# Patient Record
Sex: MALE | Race: WHITE | Employment: OTHER | URBAN - METROPOLITAN AREA
[De-identification: names, ages, dates, MRNs, and addresses within clinical notes are randomized per-mention and may not be internally consistent; named-entity substitution may affect disease eponyms.]

---

## 2018-08-09 ENCOUNTER — HOSPITAL ENCOUNTER (OUTPATIENT)
Dept: WOUND CARE | Age: 68
Discharge: HOME | End: 2018-08-09

## 2021-07-14 ENCOUNTER — APPOINTMENT (OUTPATIENT)
Dept: CT IMAGING | Age: 71
End: 2021-07-14
Payer: MEDICARE

## 2021-07-14 ENCOUNTER — HOSPITAL ENCOUNTER (EMERGENCY)
Age: 71
Discharge: HOME | End: 2021-07-14
Attending: EMERGENCY MEDICINE
Payer: MEDICARE

## 2021-07-14 VITALS
HEIGHT: 68 IN | HEART RATE: 94 BPM | DIASTOLIC BLOOD PRESSURE: 69 MMHG | TEMPERATURE: 97.9 F | BODY MASS INDEX: 28.04 KG/M2 | WEIGHT: 185 LBS | RESPIRATION RATE: 20 BRPM | SYSTOLIC BLOOD PRESSURE: 154 MMHG | OXYGEN SATURATION: 95 %

## 2021-07-14 DIAGNOSIS — R01.1 HEART MURMUR: ICD-10-CM

## 2021-07-14 DIAGNOSIS — R56.9 SEIZURE (HCC): Primary | ICD-10-CM

## 2021-07-14 LAB
ANION GAP SERPL CALCULATED.3IONS-SCNC: 16 MMOL/L (ref 7–16)
BASOPHILS ABSOLUTE: 0.04 E9/L (ref 0–0.2)
BASOPHILS RELATIVE PERCENT: 0.4 % (ref 0–2)
BUN BLDV-MCNC: 33 MG/DL (ref 6–23)
CALCIUM SERPL-MCNC: 9.1 MG/DL (ref 8.6–10.2)
CHLORIDE BLD-SCNC: 101 MMOL/L (ref 98–107)
CHP ED QC CHECK: YES
CO2: 18 MMOL/L (ref 22–29)
CREAT SERPL-MCNC: 1.9 MG/DL (ref 0.7–1.2)
EOSINOPHILS ABSOLUTE: 0.23 E9/L (ref 0.05–0.5)
EOSINOPHILS RELATIVE PERCENT: 2.1 % (ref 0–6)
GFR AFRICAN AMERICAN: 43
GFR NON-AFRICAN AMERICAN: 35 ML/MIN/1.73
GLUCOSE BLD-MCNC: 119 MG/DL
GLUCOSE BLD-MCNC: 128 MG/DL (ref 74–99)
HCT VFR BLD CALC: 34.6 % (ref 37–54)
HEMOGLOBIN: 11.8 G/DL (ref 12.5–16.5)
IMMATURE GRANULOCYTES #: 0.05 E9/L
IMMATURE GRANULOCYTES %: 0.4 % (ref 0–5)
LYMPHOCYTES ABSOLUTE: 0.85 E9/L (ref 1.5–4)
LYMPHOCYTES RELATIVE PERCENT: 7.6 % (ref 20–42)
MCH RBC QN AUTO: 33.8 PG (ref 26–35)
MCHC RBC AUTO-ENTMCNC: 34.1 % (ref 32–34.5)
MCV RBC AUTO: 99.1 FL (ref 80–99.9)
METER GLUCOSE: 119 MG/DL (ref 74–99)
MONOCYTES ABSOLUTE: 0.77 E9/L (ref 0.1–0.95)
MONOCYTES RELATIVE PERCENT: 6.9 % (ref 2–12)
NEUTROPHILS ABSOLUTE: 9.18 E9/L (ref 1.8–7.3)
NEUTROPHILS RELATIVE PERCENT: 82.6 % (ref 43–80)
PDW BLD-RTO: 12.8 FL (ref 11.5–15)
PLATELET # BLD: 249 E9/L (ref 130–450)
PMV BLD AUTO: 9.8 FL (ref 7–12)
POTASSIUM SERPL-SCNC: 4.7 MMOL/L (ref 3.5–5)
RBC # BLD: 3.49 E12/L (ref 3.8–5.8)
SODIUM BLD-SCNC: 135 MMOL/L (ref 132–146)
WBC # BLD: 11.1 E9/L (ref 4.5–11.5)

## 2021-07-14 PROCEDURE — 99284 EMERGENCY DEPT VISIT MOD MDM: CPT

## 2021-07-14 PROCEDURE — 2580000003 HC RX 258: Performed by: EMERGENCY MEDICINE

## 2021-07-14 PROCEDURE — 70450 CT HEAD/BRAIN W/O DYE: CPT

## 2021-07-14 PROCEDURE — 80048 BASIC METABOLIC PNL TOTAL CA: CPT

## 2021-07-14 PROCEDURE — 93005 ELECTROCARDIOGRAM TRACING: CPT | Performed by: EMERGENCY MEDICINE

## 2021-07-14 PROCEDURE — 85025 COMPLETE CBC W/AUTO DIFF WBC: CPT

## 2021-07-14 PROCEDURE — 82962 GLUCOSE BLOOD TEST: CPT

## 2021-07-14 RX ORDER — LISINOPRIL 30 MG/1
30 TABLET ORAL DAILY
Status: ON HOLD | COMMUNITY
End: 2022-08-17 | Stop reason: HOSPADM

## 2021-07-14 RX ORDER — 0.9 % SODIUM CHLORIDE 0.9 %
1000 INTRAVENOUS SOLUTION INTRAVENOUS ONCE
Status: COMPLETED | OUTPATIENT
Start: 2021-07-14 | End: 2021-07-14

## 2021-07-14 RX ADMIN — SODIUM CHLORIDE 1000 ML: 9 INJECTION, SOLUTION INTRAVENOUS at 14:40

## 2021-07-14 ASSESSMENT — ENCOUNTER SYMPTOMS
CHEST TIGHTNESS: 0
ABDOMINAL PAIN: 0
SHORTNESS OF BREATH: 0

## 2021-07-14 NOTE — ED PROVIDER NOTES
44-year-old male presenting via EMS after concern for a seizure that occurred prior to arrival.  He was initially minimally responsive by paramedics. Bystanders report that he fell to the ground, had generalized shaking, they were not sure how long. Patient bit his right side of his mouth, is awake, more alert and oriented now than as originally reported. Sudden onset, persistent, moderate severity, unspecified duration, no known seizure history. He did have a postictal period and is improving currently. Family History   Problem Relation Age of Onset    COPD Mother     Diabetes Mother     Diabetes Father      Past Surgical History:   Procedure Laterality Date    APPENDECTOMY      CATARACT REMOVAL WITH IMPLANT Right 2 2 15    CATARACT REMOVAL WITH IMPLANT Left 4/6/15    COLONOSCOPY  7 years ago    Dr Mitchell Sy      child       Review of Systems   Constitutional: Negative for chills and fever. HENT:        Bit his tongue   Respiratory: Negative for chest tightness and shortness of breath. Cardiovascular: Negative for chest pain. Gastrointestinal: Negative for abdominal pain. Neurological: Positive for seizures. All other systems reviewed and are negative. Physical Exam  Constitutional:       General: He is not in acute distress. Appearance: He is well-developed. HENT:      Head: Normocephalic and atraumatic. Mouth/Throat:      Comments: Lesion to the right side of his tongue  Eyes:      Pupils: Pupils are equal, round, and reactive to light. Neck:      Thyroid: No thyromegaly. Cardiovascular:      Rate and Rhythm: Normal rate and regular rhythm. Pulmonary:      Effort: Pulmonary effort is normal. No respiratory distress. Breath sounds: Normal breath sounds. No wheezing. Abdominal:      General: There is no distension. Palpations: Abdomen is soft. There is no mass. Tenderness: There is no abdominal tenderness.  There is no guarding or rebound. Musculoskeletal:         General: No tenderness. Normal range of motion. Cervical back: Normal range of motion and neck supple. Skin:     General: Skin is warm and dry. Findings: No erythema. Neurological:      Mental Status: He is alert. He is disoriented. Cranial Nerves: No cranial nerve deficit. Psychiatric:         Mood and Affect: Mood normal.          Procedures     [facility]              --------------------------------------------- PAST HISTORY ---------------------------------------------  Past Medical History:  has a past medical history of Diverticulitis, Gout, Hypertension, and Tobacco abuse. Past Surgical History:  has a past surgical history that includes Colonoscopy (7 years ago); Appendectomy; Nasal fracture surgery; Cataract removal with implant (Right, 2 2 15); and Cataract removal with implant (Left, 4/6/15). Social History:  reports that he has been smoking. He has a 60.00 pack-year smoking history. He has never used smokeless tobacco. He reports current alcohol use. He reports that he does not use drugs. Family History: family history includes COPD in his mother; Diabetes in his father and mother. The patients home medications have been reviewed. Allergies: Patient has no known allergies.     -------------------------------------------------- RESULTS -------------------------------------------------  Labs:  Results for orders placed or performed during the hospital encounter of 07/14/21   CBC auto differential   Result Value Ref Range    WBC 11.1 4.5 - 11.5 E9/L    RBC 3.49 (L) 3.80 - 5.80 E12/L    Hemoglobin 11.8 (L) 12.5 - 16.5 g/dL    Hematocrit 34.6 (L) 37.0 - 54.0 %    MCV 99.1 80.0 - 99.9 fL    MCH 33.8 26.0 - 35.0 pg    MCHC 34.1 32.0 - 34.5 %    RDW 12.8 11.5 - 15.0 fL    Platelets 225 445 - 037 E9/L    MPV 9.8 7.0 - 12.0 fL    Neutrophils % 82.6 (H) 43.0 - 80.0 %    Immature Granulocytes % 0.4 0.0 - 5.0 %    Lymphocytes % 7.6 (L) 20.0 - 42.0 %    Monocytes % 6.9 2.0 - 12.0 %    Eosinophils % 2.1 0.0 - 6.0 %    Basophils % 0.4 0.0 - 2.0 %    Neutrophils Absolute 9.18 (H) 1.80 - 7.30 E9/L    Immature Granulocytes # 0.05 E9/L    Lymphocytes Absolute 0.85 (L) 1.50 - 4.00 E9/L    Monocytes Absolute 0.77 0.10 - 0.95 E9/L    Eosinophils Absolute 0.23 0.05 - 0.50 E9/L    Basophils Absolute 0.04 0.00 - 0.20 D0/G   Basic metabolic panel   Result Value Ref Range    Sodium 135 132 - 146 mmol/L    Potassium 4.7 3.5 - 5.0 mmol/L    Chloride 101 98 - 107 mmol/L    CO2 18 (L) 22 - 29 mmol/L    Anion Gap 16 7 - 16 mmol/L    Glucose 128 (H) 74 - 99 mg/dL    BUN 33 (H) 6 - 23 mg/dL    CREATININE 1.9 (H) 0.7 - 1.2 mg/dL    GFR Non-African American 35 >=60 mL/min/1.73    GFR African American 43     Calcium 9.1 8.6 - 10.2 mg/dL   POCT Glucose   Result Value Ref Range    Glucose 119 mg/dL    QC OK? yes    POCT Glucose   Result Value Ref Range    Meter Glucose 119 (H) 74 - 99 mg/dL   EKG 12 Lead   Result Value Ref Range    Ventricular Rate 94 BPM    Atrial Rate 94 BPM    P-R Interval 186 ms    QRS Duration 88 ms    Q-T Interval 352 ms    QTc Calculation (Bazett) 440 ms    P Axis 35 degrees    R Axis 52 degrees    T Axis 39 degrees       Radiology:  CT HEAD WO CONTRAST   Final Result   1. No acute cerebral abnormality, no sign of hemorrhage or infarction. 2. Small, old right lacunar caudate lacunar infarct.             ------------------------- NURSING NOTES AND VITALS REVIEWED ---------------------------  Date / Time Roomed:  7/14/2021 12:26 PM  ED Bed Assignment:  04/04    The nursing notes within the ED encounter and vital signs as below have been reviewed.    BP (!) 154/69   Pulse 94   Temp 97.9 °F (36.6 °C) (Temporal)   Resp 20   Ht 5' 8\" (1.727 m)   Wt 185 lb (83.9 kg)   SpO2 95%   BMI 28.13 kg/m²   Oxygen Saturation Interpretation: Normal      ------------------------------------------ PROGRESS NOTES ------------------------------------------  I have spoken with the patient and discussed todays results, in addition to providing specific details for the plan of care and counseling regarding the diagnosis and prognosis. Their questions are answered at this time and they are agreeable with the plan. I discussed at length with them reasons for immediate return here for re evaluation. They will followup with primary care by calling their office tomorrow. --------------------------------- ADDITIONAL PROVIDER NOTES ---------------------------------  At this time the patient is without objective evidence of an acute process requiring hospitalization or inpatient management. They have remained hemodynamically stable throughout their entire ED visit and are stable for discharge with outpatient follow-up. The plan has been discussed in detail and they are aware of the specific conditions for emergent return, as well as the importance of follow-up. Discharge Medication List as of 7/14/2021  3:20 PM          Diagnosis:  1. Seizure ([address]    2. Heart murmur        Disposition:  Patient's disposition: Discharge to home  Patient's condition is stable.             Eriberto Page,   07/15/21 2038

## 2021-07-14 NOTE — ED NOTES
Bed: H1  Expected date:   Expected time:   Means of arrival:   Comments:  edson Ham RN  07/14/21 [id number]

## 2021-07-15 LAB
EKG ATRIAL RATE: 94 BPM
EKG P AXIS: 35 DEGREES
EKG P-R INTERVAL: 186 MS
EKG Q-T INTERVAL: 352 MS
EKG QRS DURATION: 88 MS
EKG QTC CALCULATION (BAZETT): 440 MS
EKG R AXIS: 52 DEGREES
EKG T AXIS: 39 DEGREES
EKG VENTRICULAR RATE: 94 BPM

## 2021-07-15 PROCEDURE — 93010 ELECTROCARDIOGRAM REPORT: CPT | Performed by: INTERNAL MEDICINE

## 2021-07-26 ENCOUNTER — APPOINTMENT (OUTPATIENT)
Dept: GENERAL RADIOLOGY | Age: 71
End: 2021-07-26
Payer: MEDICARE

## 2021-07-26 ENCOUNTER — HOSPITAL ENCOUNTER (EMERGENCY)
Age: 71
Discharge: HOME | End: 2021-07-26
Attending: EMERGENCY MEDICINE
Payer: MEDICARE

## 2021-07-26 VITALS
HEART RATE: 87 BPM | DIASTOLIC BLOOD PRESSURE: 79 MMHG | BODY MASS INDEX: 27.4 KG/M2 | TEMPERATURE: 98.4 F | HEIGHT: 69 IN | WEIGHT: 185 LBS | RESPIRATION RATE: 18 BRPM | SYSTOLIC BLOOD PRESSURE: 146 MMHG | OXYGEN SATURATION: 98 %

## 2021-07-26 DIAGNOSIS — S92.425B OPEN NONDISPLACED FRACTURE OF DISTAL PHALANX OF LEFT GREAT TOE, INITIAL ENCOUNTER: Primary | ICD-10-CM

## 2021-07-26 LAB
ANION GAP SERPL CALCULATED.3IONS-SCNC: 12 MMOL/L (ref 7–16)
BASOPHILS ABSOLUTE: 0.03 E9/L (ref 0–0.2)
BASOPHILS RELATIVE PERCENT: 0.3 % (ref 0–2)
BUN BLDV-MCNC: 45 MG/DL (ref 6–23)
CALCIUM SERPL-MCNC: 9.3 MG/DL (ref 8.6–10.2)
CHLORIDE BLD-SCNC: 101 MMOL/L (ref 98–107)
CO2: 21 MMOL/L (ref 22–29)
CREAT SERPL-MCNC: 2 MG/DL (ref 0.7–1.2)
EOSINOPHILS ABSOLUTE: 0.22 E9/L (ref 0.05–0.5)
EOSINOPHILS RELATIVE PERCENT: 2.1 % (ref 0–6)
GFR AFRICAN AMERICAN: 40
GFR NON-AFRICAN AMERICAN: 33 ML/MIN/1.73
GLUCOSE BLD-MCNC: 80 MG/DL (ref 74–99)
HCT VFR BLD CALC: 32.5 % (ref 37–54)
HEMOGLOBIN: 11.2 G/DL (ref 12.5–16.5)
IMMATURE GRANULOCYTES #: 0.03 E9/L
IMMATURE GRANULOCYTES %: 0.3 % (ref 0–5)
LACTIC ACID: 1.5 MMOL/L (ref 0.5–2.2)
LYMPHOCYTES ABSOLUTE: 1.16 E9/L (ref 1.5–4)
LYMPHOCYTES RELATIVE PERCENT: 11.2 % (ref 20–42)
MCH RBC QN AUTO: 33.9 PG (ref 26–35)
MCHC RBC AUTO-ENTMCNC: 34.5 % (ref 32–34.5)
MCV RBC AUTO: 98.5 FL (ref 80–99.9)
MONOCYTES ABSOLUTE: 1.08 E9/L (ref 0.1–0.95)
MONOCYTES RELATIVE PERCENT: 10.4 % (ref 2–12)
NEUTROPHILS ABSOLUTE: 7.82 E9/L (ref 1.8–7.3)
NEUTROPHILS RELATIVE PERCENT: 75.7 % (ref 43–80)
PDW BLD-RTO: 12.8 FL (ref 11.5–15)
PLATELET # BLD: 299 E9/L (ref 130–450)
PMV BLD AUTO: 10.1 FL (ref 7–12)
POTASSIUM SERPL-SCNC: 4.7 MMOL/L (ref 3.5–5)
RBC # BLD: 3.3 E12/L (ref 3.8–5.8)
SODIUM BLD-SCNC: 134 MMOL/L (ref 132–146)
WBC # BLD: 10.3 E9/L (ref 4.5–11.5)

## 2021-07-26 PROCEDURE — 73630 X-RAY EXAM OF FOOT: CPT

## 2021-07-26 PROCEDURE — 85025 COMPLETE CBC W/AUTO DIFF WBC: CPT

## 2021-07-26 PROCEDURE — 36415 COLL VENOUS BLD VENIPUNCTURE: CPT

## 2021-07-26 PROCEDURE — 87040 BLOOD CULTURE FOR BACTERIA: CPT

## 2021-07-26 PROCEDURE — 80048 BASIC METABOLIC PNL TOTAL CA: CPT

## 2021-07-26 PROCEDURE — 2580000003 HC RX 258: Performed by: EMERGENCY MEDICINE

## 2021-07-26 PROCEDURE — 99283 EMERGENCY DEPT VISIT LOW MDM: CPT

## 2021-07-26 PROCEDURE — 6360000002 HC RX W HCPCS: Performed by: PHYSICIAN ASSISTANT

## 2021-07-26 PROCEDURE — 6360000002 HC RX W HCPCS: Performed by: EMERGENCY MEDICINE

## 2021-07-26 PROCEDURE — 83605 ASSAY OF LACTIC ACID: CPT

## 2021-07-26 PROCEDURE — 96374 THER/PROPH/DIAG INJ IV PUSH: CPT

## 2021-07-26 PROCEDURE — 90471 IMMUNIZATION ADMIN: CPT | Performed by: PHYSICIAN ASSISTANT

## 2021-07-26 PROCEDURE — 90715 TDAP VACCINE 7 YRS/> IM: CPT | Performed by: PHYSICIAN ASSISTANT

## 2021-07-26 RX ORDER — AMOXICILLIN AND CLAVULANATE POTASSIUM 875; 125 MG/1; MG/1
1 TABLET, FILM COATED ORAL 2 TIMES DAILY
Qty: 14 TABLET | Refills: 0 | Status: SHIPPED | OUTPATIENT
Start: 2021-07-26 | End: 2021-08-02

## 2021-07-26 RX ADMIN — TETANUS TOXOID, REDUCED DIPHTHERIA TOXOID AND ACELLULAR PERTUSSIS VACCINE, ADSORBED 0.5 ML: 5; 2.5; 8; 8; 2.5 SUSPENSION INTRAMUSCULAR at 16:31

## 2021-07-26 RX ADMIN — WATER 1000 MG: 1 INJECTION INTRAMUSCULAR; INTRAVENOUS; SUBCUTANEOUS at 18:25

## 2021-07-26 ASSESSMENT — PAIN DESCRIPTION - PAIN TYPE: TYPE: ACUTE PAIN

## 2021-07-26 ASSESSMENT — PAIN DESCRIPTION - ORIENTATION: ORIENTATION: LEFT

## 2021-07-26 ASSESSMENT — PAIN DESCRIPTION - FREQUENCY: FREQUENCY: INTERMITTENT

## 2021-07-26 ASSESSMENT — PAIN DESCRIPTION - LOCATION: LOCATION: TOE (COMMENT WHICH ONE)

## 2021-07-26 ASSESSMENT — ENCOUNTER SYMPTOMS
CHEST TIGHTNESS: 0
SHORTNESS OF BREATH: 0

## 2021-07-26 NOTE — ED PROVIDER NOTES
65-year-old male presenting with pain and redness and swelling the left foot. He dropped a can of green beans onto his great toe a few days ago. He has redness, dried blood, swelling to the great toe along the foot as well. Patient awake calm alert, oriented x4. No distress at this time. No shortness of breath, no chest pain, no lightheadedness, no systemic fever or chills, no body aches or other signs of illness. This is a sudden onset problem, persistent, mild to moderate severity, few days duration, associate with dropping something on his foot. Tetanus will be updated today. Family History   Problem Relation Age of Onset    COPD Mother     Diabetes Mother     Diabetes Father      Past Surgical History:   Procedure Laterality Date    APPENDECTOMY      CATARACT REMOVAL WITH IMPLANT Right 2 2 15    CATARACT REMOVAL WITH IMPLANT Left 4/6/15    COLONOSCOPY  7 years ago    Dr Palak Albert      child       Review of Systems   Constitutional: Negative for chills and fever. Respiratory: Negative for chest tightness and shortness of breath. Cardiovascular: Positive for leg swelling. Musculoskeletal:        Toe pain, redness     All other systems reviewed and are negative. Physical Exam  Constitutional:       General: He is not in acute distress. Appearance: He is well-developed. HENT:      Head: Normocephalic and atraumatic. Eyes:      Pupils: Pupils are equal, round, and reactive to light. Neck:      Thyroid: No thyromegaly. Cardiovascular:      Rate and Rhythm: Normal rate and regular rhythm. Pulmonary:      Effort: Pulmonary effort is normal. No respiratory distress. Breath sounds: Normal breath sounds. No wheezing. Abdominal:      General: There is no distension. Palpations: Abdomen is soft. There is no mass. Tenderness: There is no abdominal tenderness. There is no guarding or rebound.    Musculoskeletal:         General: No tenderness. Normal range of motion. Cervical back: Normal range of motion and neck supple. Skin:     General: Skin is warm and dry. Findings: Erythema present. Comments: Erythema and swelling to the left great toe, swelling to the foot as well, redness extending to the midfoot. Neurological:      Mental Status: He is alert and oriented to person, place, and time. Cranial Nerves: No cranial nerve deficit. Psychiatric:         Mood and Affect: Mood normal.         Behavior: Behavior normal.          Procedures     [facility]     ED Course as of Jul 26 2113   Mon Jul 26, 2021   Kate Riverogina 694 Spoke with the radiologist regarding the fracture of the distal phalanx on the toe. [SO]      ED Course User Index  [SO] [facility]      ED Course as of Jul 26 2113   Mon Jul 26, 2021   1826 Spoke with the radiologist regarding the fracture of the distal phalanx on the toe. [SO]      ED Course User Index  [SO] Josue Vallejo DO       --------------------------------------------- PAST HISTORY ---------------------------------------------  Past Medical History:  has a past medical history of Diverticulitis, Gout, Hypertension, and Tobacco abuse. Past Surgical History:  has a past surgical history that includes Colonoscopy (7 years ago); Appendectomy; Nasal fracture surgery; Cataract removal with implant (Right, 2 2 15); and Cataract removal with implant (Left, 4/6/15). Social History:  reports that he has been smoking. He has a 60.00 pack-year smoking history. He has never used smokeless tobacco. He reports current alcohol use. He reports that he does not use drugs. Family History: family history includes COPD in his mother; Diabetes in his father and mother. The patients home medications have been reviewed. Allergies: Patient has no known allergies.     -------------------------------------------------- RESULTS -------------------------------------------------  Labs:  Results for orders placed or performed during the hospital encounter of 07/26/21   CBC Auto Differential   Result Value Ref Range    WBC 10.3 4.5 - 11.5 E9/L    RBC 3.30 (L) 3.80 - 5.80 E12/L    Hemoglobin 11.2 (L) 12.5 - 16.5 g/dL    Hematocrit 32.5 (L) 37.0 - 54.0 %    MCV 98.5 80.0 - 99.9 fL    MCH 33.9 26.0 - 35.0 pg    MCHC 34.5 32.0 - 34.5 %    RDW 12.8 11.5 - 15.0 fL    Platelets 564 781 - 365 E9/L    MPV 10.1 7.0 - 12.0 fL    Neutrophils % 75.7 43.0 - 80.0 %    Immature Granulocytes % 0.3 0.0 - 5.0 %    Lymphocytes % 11.2 (L) 20.0 - 42.0 %    Monocytes % 10.4 2.0 - 12.0 %    Eosinophils % 2.1 0.0 - 6.0 %    Basophils % 0.3 0.0 - 2.0 %    Neutrophils Absolute 7.82 (H) 1.80 - 7.30 E9/L    Immature Granulocytes # 0.03 E9/L    Lymphocytes Absolute 1.16 (L) 1.50 - 4.00 E9/L    Monocytes Absolute 1.08 (H) 0.10 - 0.95 E9/L    Eosinophils Absolute 0.22 0.05 - 0.50 E9/L    Basophils Absolute 0.03 0.00 - 0.20 Y5/A   Basic Metabolic Panel   Result Value Ref Range    Sodium 134 132 - 146 mmol/L    Potassium 4.7 3.5 - 5.0 mmol/L    Chloride 101 98 - 107 mmol/L    CO2 21 (L) 22 - 29 mmol/L    Anion Gap 12 7 - 16 mmol/L    Glucose 80 74 - 99 mg/dL    BUN 45 (H) 6 - 23 mg/dL    CREATININE 2.0 (H) 0.7 - 1.2 mg/dL    GFR Non-African American 33 >=60 mL/min/1.73    GFR African American 40     Calcium 9.3 8.6 - 10.2 mg/dL   Lactic Acid, Plasma   Result Value Ref Range    Lactic Acid 1.5 0.5 - 2.2 mmol/L       Radiology:  XR FOOT LEFT (MIN 3 VIEWS)   Final Result   Addendum 1 of 1   ADDENDUM:   Further history was obtained the patient has dropped a heavy object on the   great toe. A transverse lucency is present and most likely resent    represents   an acute fracture. Findings were discussed with Dr. Carlota Parnell on 07/26/2021    at   6:25 p.m. Jackie Vogt Final   No acute osseous abnormality. Dorsal soft tissue edema. Large plantar and dorsal calcaneal spurs. Degenerative changes PIP and D IP joint spaces and MTP joint great toe. ------------------------- NURSING NOTES AND VITALS REVIEWED ---------------------------  Date / Time Roomed:  7/26/2021  4:12 PM  ED Bed Assignment:  02/02    The nursing notes within the ED encounter and vital signs as below have been reviewed. BP (!) 146/79   Pulse 87   Temp 98.4 °F (36.9 °C)   Resp 18   Ht 5' 9\" (1.753 m)   Wt 185 lb (83.9 kg)   SpO2 98%   BMI 27.32 kg/m²   Oxygen Saturation Interpretation: Normal      ------------------------------------------ PROGRESS NOTES ------------------------------------------  I have spoken with the patient and discussed todays results, in addition to providing specific details for the plan of care and counseling regarding the diagnosis and prognosis. Their questions are answered at this time and they are agreeable with the plan. I discussed at length with them reasons for immediate return here for re evaluation. They will followup with primary care by calling their office tomorrow. Patient in no distress, does evidence of infection with the fracture. Concern for open fracture. Will be given a dose of IV antibiotics to begin with, sent home with antibiotics. This occurred 5 days ago, it is not been treated with antibiotic before this is the patient has delayed presenting for evaluation. Now there is erythema and continued swelling and pain he is here. He was given antibiotics, he refused a splint, did not want any additional support as he is wearing sandal at this time. They request Dr. Lorrayne Moritz, podiatry to follow-up with. They also understand they can return to the ED for any problems any worsening.    --------------------------------- ADDITIONAL PROVIDER NOTES ---------------------------------  At this time the patient is without objective evidence of an acute process requiring hospitalization or inpatient management.   They have remained hemodynamically stable throughout their entire ED visit and are stable for discharge with outpatient follow-up. The plan has been discussed in detail and they are aware of the specific conditions for emergent return, as well as the importance of follow-up. Discharge Medication List as of 7/26/2021  6:54 PM      START taking these medications    Details   amoxicillin-clavulanate (AUGMENTIN) 875-125 MG per tablet Take 1 tablet by mouth 2 times daily for 7 days, Disp-14 tablet, R-0Print             Diagnosis:  1. Open nondisplaced fracture of distal phalanx of left great toe, initial encounter        Disposition:  Patient's disposition: Discharge to home  Patient's condition is stable.               Greg Alvarado DO  07/26/21 9340

## 2021-07-26 NOTE — ED NOTES
FIRST PROVIDER CONTACT ASSESSMENT NOTE                                                                                                Department of Emergency Medicine                                                      First Provider Note  21  3:33 PM EDT  NAME: Matty Magaña  : 1950  MRN: [id number]    Chief Complaint: Cellulitis (left foot, ) and Wound Check (left great toe/foot. Dropped can of vegetables )      History of Present Illness:   Matty Magaña is a 73 y.o. male who presents to the ED for left foot pain, swelling, redness after dropping can on it Wednesday. Focused Physical Exam:  VS:    ED Triage Vitals   BP Temp Temp src Pulse Resp SpO2 Height Weight   21 1526 21 1526 -- 21 1526 21 1530 21 1526 21 1526 21 1526   (!) 152/61 98.4 °F (36.9 °C)  99 20 98 % 5' 9\" (1.753 m) 185 lb (83.9 kg)        General: Alert and in no apparent distress. Medical History:  has a past medical history of Diverticulitis, Gout, Hypertension, and Tobacco abuse. Surgical History:  has a past surgical history that includes Colonoscopy (7 years ago); Appendectomy; Nasal fracture surgery; Cataract removal with implant (Right, 2 2 15); and Cataract removal with implant (Left, 4/6/15). Social History:  reports that he has been smoking. He has a 60.00 pack-year smoking history. He has never used smokeless tobacco. He reports current alcohol use. He reports that he does not use drugs. Family History: family history includes COPD in his mother; Diabetes in his father and mother. Allergies: Patient has no known allergies.      Initial Plan of Care:  Initiate Treatment-Testing, Proceed toTreatment Area When Bed Available for ED Attending/MLP to Continue Care    -------------------------------------------------END OF FIRST PROVIDER CONTACT ASSESSMENT NOTE--------------------------------------------------------  Electronically signed by Fritz Elizondo [place], [place]   DD: 7/26/21       [place] , [place]  07/26/21 [id number]

## 2021-07-31 LAB
BLOOD CULTURE, ROUTINE: NORMAL
CULTURE, BLOOD 2: NORMAL

## 2021-08-11 ENCOUNTER — APPOINTMENT (OUTPATIENT)
Dept: CT IMAGING | Age: 71
End: 2021-08-11
Payer: MEDICARE

## 2021-08-11 ENCOUNTER — HOSPITAL ENCOUNTER (EMERGENCY)
Age: 71
Discharge: HOME | End: 2021-08-11
Attending: EMERGENCY MEDICINE
Payer: MEDICARE

## 2021-08-11 VITALS
SYSTOLIC BLOOD PRESSURE: 134 MMHG | BODY MASS INDEX: 27.32 KG/M2 | OXYGEN SATURATION: 97 % | DIASTOLIC BLOOD PRESSURE: 60 MMHG | HEART RATE: 102 BPM | TEMPERATURE: 97.2 F | RESPIRATION RATE: 20 BRPM | WEIGHT: 185 LBS

## 2021-08-11 DIAGNOSIS — R10.13 ABDOMINAL PAIN, EPIGASTRIC: Primary | ICD-10-CM

## 2021-08-11 LAB
ALBUMIN SERPL-MCNC: 3.8 G/DL (ref 3.5–5.2)
ALP BLD-CCNC: 91 U/L (ref 40–129)
ALT SERPL-CCNC: 24 U/L (ref 0–40)
ANION GAP SERPL CALCULATED.3IONS-SCNC: 11 MMOL/L (ref 7–16)
AST SERPL-CCNC: 24 U/L (ref 0–39)
BASOPHILS ABSOLUTE: 0.03 E9/L (ref 0–0.2)
BASOPHILS RELATIVE PERCENT: 0.2 % (ref 0–2)
BILIRUB SERPL-MCNC: 0.3 MG/DL (ref 0–1.2)
BUN BLDV-MCNC: 39 MG/DL (ref 6–23)
CALCIUM SERPL-MCNC: 10.1 MG/DL (ref 8.6–10.2)
CHLORIDE BLD-SCNC: 88 MMOL/L (ref 98–107)
CO2: 29 MMOL/L (ref 22–29)
CREAT SERPL-MCNC: 1.9 MG/DL (ref 0.7–1.2)
EOSINOPHILS ABSOLUTE: 0.81 E9/L (ref 0.05–0.5)
EOSINOPHILS RELATIVE PERCENT: 6.4 % (ref 0–6)
GFR AFRICAN AMERICAN: 43
GFR NON-AFRICAN AMERICAN: 35 ML/MIN/1.73
GLUCOSE BLD-MCNC: 130 MG/DL (ref 74–99)
HCT VFR BLD CALC: 29.5 % (ref 37–54)
HEMOGLOBIN: 10.5 G/DL (ref 12.5–16.5)
IMMATURE GRANULOCYTES #: 0.04 E9/L
IMMATURE GRANULOCYTES %: 0.3 % (ref 0–5)
LIPASE: 25 U/L (ref 13–60)
LYMPHOCYTES ABSOLUTE: 1.03 E9/L (ref 1.5–4)
LYMPHOCYTES RELATIVE PERCENT: 8.1 % (ref 20–42)
MCH RBC QN AUTO: 33.5 PG (ref 26–35)
MCHC RBC AUTO-ENTMCNC: 35.6 % (ref 32–34.5)
MCV RBC AUTO: 94.2 FL (ref 80–99.9)
MONOCYTES ABSOLUTE: 1.01 E9/L (ref 0.1–0.95)
MONOCYTES RELATIVE PERCENT: 8 % (ref 2–12)
NEUTROPHILS ABSOLUTE: 9.78 E9/L (ref 1.8–7.3)
NEUTROPHILS RELATIVE PERCENT: 77 % (ref 43–80)
PDW BLD-RTO: 12.4 FL (ref 11.5–15)
PLATELET # BLD: 360 E9/L (ref 130–450)
PMV BLD AUTO: 10 FL (ref 7–12)
POTASSIUM REFLEX MAGNESIUM: 4.3 MMOL/L (ref 3.5–5)
RBC # BLD: 3.13 E12/L (ref 3.8–5.8)
SODIUM BLD-SCNC: 128 MMOL/L (ref 132–146)
TOTAL PROTEIN: 7.1 G/DL (ref 6.4–8.3)
TROPONIN, HIGH SENSITIVITY: 19 NG/L (ref 0–11)
TROPONIN, HIGH SENSITIVITY: 22 NG/L (ref 0–11)
WBC # BLD: 12.7 E9/L (ref 4.5–11.5)

## 2021-08-11 PROCEDURE — 99283 EMERGENCY DEPT VISIT LOW MDM: CPT

## 2021-08-11 PROCEDURE — 6370000000 HC RX 637 (ALT 250 FOR IP)

## 2021-08-11 PROCEDURE — 80053 COMPREHEN METABOLIC PANEL: CPT

## 2021-08-11 PROCEDURE — 84484 ASSAY OF TROPONIN QUANT: CPT

## 2021-08-11 PROCEDURE — 74176 CT ABD & PELVIS W/O CONTRAST: CPT

## 2021-08-11 PROCEDURE — 85025 COMPLETE CBC W/AUTO DIFF WBC: CPT

## 2021-08-11 PROCEDURE — 93005 ELECTROCARDIOGRAM TRACING: CPT

## 2021-08-11 PROCEDURE — 83690 ASSAY OF LIPASE: CPT

## 2021-08-11 PROCEDURE — 2580000003 HC RX 258

## 2021-08-11 RX ORDER — 0.9 % SODIUM CHLORIDE 0.9 %
1000 INTRAVENOUS SOLUTION INTRAVENOUS ONCE
Status: COMPLETED | OUTPATIENT
Start: 2021-08-11 | End: 2021-08-11

## 2021-08-11 RX ORDER — PANTOPRAZOLE SODIUM 40 MG/1
40 TABLET, DELAYED RELEASE ORAL
Qty: 7 TABLET | Refills: 0 | Status: ON HOLD | OUTPATIENT
Start: 2021-08-11 | End: 2022-08-17 | Stop reason: HOSPADM

## 2021-08-11 RX ADMIN — SODIUM CHLORIDE 1000 ML: 9 INJECTION, SOLUTION INTRAVENOUS at 15:11

## 2021-08-11 RX ADMIN — ALUMINUM HYDROXIDE, MAGNESIUM HYDROXIDE, AND SIMETHICONE: 200; 200; 20 SUSPENSION ORAL at 15:09

## 2021-08-11 ASSESSMENT — ENCOUNTER SYMPTOMS
CHOKING: 0
VOMITING: 0
NAUSEA: 0
ABDOMINAL PAIN: 1
COUGH: 0
DIARRHEA: 0
SHORTNESS OF BREATH: 0
COLOR CHANGE: 0
BLOOD IN STOOL: 0
CHEST TIGHTNESS: 0
CONSTIPATION: 1
SORE THROAT: 0

## 2021-08-11 NOTE — ED PROVIDER NOTES
[address]      Pt Name: Betsy Pratt  MRN: [id number]  [place] 1950  Date of evaluation: 8/11/2021      CHIEF COMPLAINT       Chief Complaint   Patient presents with    Heartburn     heart burn and upper abd pain since last friday. Has been taking tums multiple times a day, was helping but not today    Abdominal Pain        HPI  Betsy Pratt is a 73 y.o. male with worsening abdominal pain that is been going on since Friday. Patient describes the pain as a burning sensation in the epigastric area that sometimes radiates to his bellybutton, rates the pain 5 out of 10. He said he tried taking some Tums and bicarb fluids which initially helped with his symptoms, aggravated by fatty meals. Patient states that this feels like a similar episode in the past when he had heartburn. Patient also complains of lack of appetite due to pain but tolerating fluids with normal urine output. Patient has not had a bowel movement in in 2 days but he is passing gas. Patient denies any fever, chills, nausea, vomiting, chest pain, potation's, shortness of breath, leg swelling, diarrhea, dysuria, hematuria. States that he recently was in the emergency room for foot injury, and he is currently taking antibiotics. Past abd surgical history include emergency laparotomy in 2005 and had his appendix removed in childhood. Except as noted above the remainder of the review of systems was reviewed and negative. Review of Systems   Constitutional: Positive for appetite change. Negative for chills, fatigue and fever. HENT: Negative for congestion and sore throat. Eyes: Negative for visual disturbance. Respiratory: Negative for cough, choking, chest tightness and shortness of breath. Cardiovascular: Negative for chest pain, palpitations and leg swelling. Gastrointestinal: Positive for abdominal pain and constipation.  Negative for blood in stool, diarrhea, nausea and vomiting. Endocrine: Negative for polyphagia. Genitourinary: Negative for decreased urine volume, difficulty urinating, flank pain and hematuria. Musculoskeletal: Negative for arthralgias, gait problem, joint swelling and myalgias. Skin: Negative for color change, pallor, rash and wound. Neurological: Negative for dizziness, tremors, seizures, syncope, weakness, light-headedness, numbness and headaches. Hematological: Negative for adenopathy. Does not bruise/bleed easily. Psychiatric/Behavioral: Negative for confusion and hallucinations. All other systems reviewed and are negative. Physical Exam  Vitals reviewed. Constitutional:       General: He is not in acute distress. Appearance: Normal appearance. He is well-developed and normal weight. He is not ill-appearing, toxic-appearing or diaphoretic. HENT:      Head: Normocephalic and atraumatic. Right Ear: External ear normal.      Left Ear: External ear normal.      Nose: Nose normal. No congestion or rhinorrhea. Mouth/Throat:      Mouth: Mucous membranes are moist.      Pharynx: Oropharynx is clear. No oropharyngeal exudate or posterior oropharyngeal erythema. Eyes:      Extraocular Movements: Extraocular movements intact. Conjunctiva/sclera: Conjunctivae normal.      Pupils: Pupils are equal, round, and reactive to light. Cardiovascular:      Rate and Rhythm: Normal rate and regular rhythm. Pulses: Normal pulses. Pulmonary:      Effort: Pulmonary effort is normal. No respiratory distress. Breath sounds: Normal breath sounds. No wheezing or rhonchi. Chest:      Chest wall: No tenderness. Abdominal:      General: Abdomen is flat. A surgical scar is present. Bowel sounds are normal. There is no distension. Palpations: Abdomen is soft. Tenderness: There is abdominal tenderness in the epigastric area.  There is no right CVA tenderness, left CVA tenderness, guarding or rebound. Negative signs include Jarquin's sign, Rovsing's sign, McBurney's sign, psoas sign and obturator sign. Hernia: No hernia is present. Musculoskeletal:      Cervical back: Normal range of motion. Right lower leg: No edema. Left lower leg: No edema. Skin:     General: Skin is warm and dry. Capillary Refill: Capillary refill takes less than 2 seconds. Neurological:      General: No focal deficit present. Mental Status: He is alert and oriented to person, place, and time. Mental status is at baseline. Psychiatric:         Mood and Affect: Mood normal.         Behavior: Behavior normal.         Thought Content: Thought content normal.         Judgment: Judgment normal.          Procedures     MDM    73 y.o. male with worsening abdominal pain that is been going on since Friday. Patient describes the pain as a burning sensation in the epigastric area that sometimes radiates to his bellybutton, rates the pain 5 out of 10. In the ED, pt remained HD stable with significant symptomatic improvement after GI cocktail and fluids. Labs remarkable for leukocytosis, trop initially 22 but trending down. CT abd demonstrated thickening duodenal wall. Patient would like to follow-up with GI outpatient, feels comfortable going home. Patient was given strict ED return precautions. ED Course as of Aug 11 1903   Wed Aug 11, 2021   1735 Patient feels significantly better after GI cocktail and fluids. Spoke to patient about lab results and CT results. At this time he prefers to go home and follow up outpatient with GI.     [TC]      ED Course User Mckenzie Whitt MD       --------------------------------------------- PAST HISTORY ---------------------------------------------  Past Medical History:  has a past medical history of Diverticulitis, Gout, Hypertension, and Tobacco abuse.     Past Surgical History:  has a past surgical history that includes Colonoscopy (7 years ago); Appendectomy; Nasal fracture surgery; Cataract removal with implant (Right, 2 2 15); and Cataract removal with implant (Left, 4/6/15). Social History:  reports that he has been smoking. He has a 60.00 pack-year smoking history. He has never used smokeless tobacco. He reports current alcohol use. He reports that he does not use drugs. Family History: family history includes COPD in his mother; Diabetes in his father and mother. The patients home medications have been reviewed. Allergies: Patient has no known allergies.     -------------------------------------------------- RESULTS -------------------------------------------------  Labs:  Results for orders placed or performed during the hospital encounter of 08/11/21   CBC Auto Differential   Result Value Ref Range    WBC 12.7 (H) 4.5 - 11.5 E9/L    RBC 3.13 (L) 3.80 - 5.80 E12/L    Hemoglobin 10.5 (L) 12.5 - 16.5 g/dL    Hematocrit 29.5 (L) 37.0 - 54.0 %    MCV 94.2 80.0 - 99.9 fL    MCH 33.5 26.0 - 35.0 pg    MCHC 35.6 (H) 32.0 - 34.5 %    RDW 12.4 11.5 - 15.0 fL    Platelets 818 202 - 977 E9/L    MPV 10.0 7.0 - 12.0 fL    Neutrophils % 77.0 43.0 - 80.0 %    Immature Granulocytes % 0.3 0.0 - 5.0 %    Lymphocytes % 8.1 (L) 20.0 - 42.0 %    Monocytes % 8.0 2.0 - 12.0 %    Eosinophils % 6.4 (H) 0.0 - 6.0 %    Basophils % 0.2 0.0 - 2.0 %    Neutrophils Absolute 9.78 (H) 1.80 - 7.30 E9/L    Immature Granulocytes # 0.04 E9/L    Lymphocytes Absolute 1.03 (L) 1.50 - 4.00 E9/L    Monocytes Absolute 1.01 (H) 0.10 - 0.95 E9/L    Eosinophils Absolute 0.81 (H) 0.05 - 0.50 E9/L    Basophils Absolute 0.03 0.00 - 0.20 E9/L   Comprehensive Metabolic Panel w/ Reflex to MG   Result Value Ref Range    Sodium 128 (L) 132 - 146 mmol/L    Potassium reflex Magnesium 4.3 3.5 - 5.0 mmol/L    Chloride 88 (L) 98 - 107 mmol/L    CO2 29 22 - 29 mmol/L    Anion Gap 11 7 - 16 mmol/L    Glucose 130 (H) 74 - 99 mg/dL    BUN 39 (H) 6 - 23 mg/dL    CREATININE 1.9 (H) 0.7 - 1.2

## 2021-08-12 LAB
EKG ATRIAL RATE: 74 BPM
EKG P AXIS: 41 DEGREES
EKG P-R INTERVAL: 182 MS
EKG Q-T INTERVAL: 394 MS
EKG QRS DURATION: 86 MS
EKG QTC CALCULATION (BAZETT): 437 MS
EKG R AXIS: 51 DEGREES
EKG T AXIS: 35 DEGREES
EKG VENTRICULAR RATE: 74 BPM

## 2021-08-12 PROCEDURE — 93010 ELECTROCARDIOGRAM REPORT: CPT | Performed by: INTERNAL MEDICINE

## 2021-09-03 ENCOUNTER — OFFICE VISIT (OUTPATIENT)
Dept: NEUROLOGY | Age: 71
End: 2021-09-03
Payer: MEDICARE

## 2021-09-03 VITALS
SYSTOLIC BLOOD PRESSURE: 167 MMHG | DIASTOLIC BLOOD PRESSURE: 69 MMHG | OXYGEN SATURATION: 97 % | HEIGHT: 68 IN | BODY MASS INDEX: 26.67 KG/M2 | HEART RATE: 103 BPM | WEIGHT: 176 LBS | TEMPERATURE: 98.3 F

## 2021-09-03 DIAGNOSIS — R55 SYNCOPE, UNSPECIFIED SYNCOPE TYPE: Primary | ICD-10-CM

## 2021-09-03 PROCEDURE — 99203 OFFICE O/P NEW LOW 30 MIN: CPT | Performed by: NURSE PRACTITIONER

## 2021-09-03 RX ORDER — AMOXICILLIN AND CLAVULANATE POTASSIUM 875; 125 MG/1; MG/1
TABLET, FILM COATED ORAL
Status: ON HOLD | COMMUNITY
Start: 2021-08-02 | End: 2022-08-17 | Stop reason: HOSPADM

## 2021-09-03 RX ORDER — ALLOPURINOL 300 MG/1
TABLET ORAL
Status: ON HOLD | COMMUNITY
Start: 2021-08-02 | End: 2022-08-11 | Stop reason: ALTCHOICE

## 2021-09-03 NOTE — PROGRESS NOTES
[address] [facility]. Tree Coley M.D., F.A.C.P. Fiordaliza Aparicio, DNP, APRN, ACNS-BC  Ceciliajoceline Brooke. Sarah Harvey, MSN, APRN-FNP-C  Binta Deras, MSN, APRN-FNP-C  BLAIR Tovar, PA-C  Toño Paz, MSN, APRN-FNP-C  [address] [address]  [place], [address]  Phone: [phone]  Fax: [phone]       Shorty Aguirre is a 73 y.o. right handed male     Patient is hospital follow up for possible seizure      Past Medical History:     Past Medical History:   Diagnosis Date    Diverticulitis     Gout     Hypertension     Tobacco abuse      RA     No history of cardiac, liver, lung or kidney disease. No history of connective tissue disorders or cancers. No history of exposures to toxins or chemicals. History of hits to the head: none  Injuries: concussion as a child   MVAs: none    Past Surgical History:       Past Surgical History:   Procedure Laterality Date    APPENDECTOMY      CATARACT REMOVAL WITH IMPLANT Right 2 2 15    CATARACT REMOVAL WITH IMPLANT Left 4/6/15    COLONOSCOPY  7 years ago    Dr Salomón Harvey      child     Allergies:       Patient has no known allergies. Medications:     Prior to Admission medications    Medication Sig Start Date End Date Taking?  Authorizing Provider   allopurinol (ZYLOPRIM) 300 MG tablet TAKE ONE TABLET BY MOUTH DAILY 8/2/21  Yes Historical Provider, MD   amoxicillin-clavulanate (AUGMENTIN) 875-125 MG per tablet TAKE ONE TABLET BY MOUTH EVERY 12 HOURS FOR 10 DAYS 8/2/21  Yes Historical Provider, MD   pantoprazole (PROTONIX) 40 MG tablet Take 1 tablet by mouth every morning (before breakfast) for 7 days 8/11/21 9/3/21 Yes Ulysses Kallman, MD   lisinopril (PRINIVIL;ZESTRIL) 30 MG tablet Take 30 mg by mouth daily   Yes Historical Provider, MD   aspirin 81 MG tablet Take 81 mg by mouth daily   Yes Historical Provider, MD   hydroxychloroquine (PLAQUENIL) 200 MG tablet Take 200 mg by mouth 2 times daily Yes Historical Provider, MD   Cholecalciferol (VITAMIN D) 2000 UNITS CAPS capsule Take 1 capsule by mouth daily   Yes Historical Provider, MD     Social History:       He reports that he has been smoking. He has a 60.00 pack-year smoking history. He has never used smokeless tobacco. He reports current alcohol use. He reports that he does not use drugs. Review of Systems:     No issues with chewing or swallowing  No chest pain or palpitations  No SOB  No vertigo, lightheadedness or loss of consciousness  No falls, tripping or stumbling  No incontinence of bowels or bladder  No itching or bruising appreciated  No numbness, tingling or focal arm/leg weakness    ROS is otherwise negative    Family History:     Family History   Problem Relation Age of Onset    COPD Mother     Diabetes Mother     Diabetes Father       History of Present Illness:     Patient is a hospital follow up for seizure like activity that occurred on July 14, 2021. He was entering [place] [address] to buy buns when he started to feel dizzy and tried to grab a hold of something to not fall. He then woke up in the hospital.  EMR reports that he was witnessed by bystanders falling to the ground and having generalized shaking and not sure for how long. He did bite the right side of his mouth. He was awake and oriented once assessed in the ED. He has no history of seizures or family history of seizures. CTH showed no acute findings but reports old right lacunar infarct. Patient has no recollection of stroke. He notes that he does drink 3-4 beers a day most days. He had not started drinking that day when this occurred. This was the first and only time this has happened according to patient. He was also found during that ED visit to have a heart murmur.       Objective:       Vitals:    09/03/21 1304   BP: (!) 167/69   Pulse: 103   Temp: 98.3 °F (36.8 °C)   SpO2: 97%   Weight: 176 lb (79.8 kg)   Height: 5' 8\" (1.727 m)     General appearance: alert, appears stated age, cooperative and in no distress  Head: normocephalic, without obvious abnormality, atraumatic  Eyes: conjunctivae/corneas clear; no drainage  Neck: supple, symmetrical, trachea midline   Lungs: clear to auscultation bilaterally  Heart: regular rate and rhythm, S1, S2 normal  Abdomen: soft, non-tender; bowel sounds normal  Extremities: normal, atraumatic, no cyanosis or edema  Skin:  color, texture, turgor normal--no rashes or lesions      Mental Status: alert and oriented x 4    Appropriate attention/concentration  Intact fundus of knowledge  Repetition intact  Memories intact    Speech: no dysarthria  Language: no aphasias    Cranial Nerves:  I: smell    II: visual acuity     II: visual fields Full    II: pupils MEGHAN   III,VII: ptosis None   III,IV,VI: extraocular muscles  EOMI without nystagmus   V: mastication Normal   V: facial light touch sensation  Normal   V,VII: corneal reflex     VII: facial muscle function - upper  Normal   VII: facial muscle function - lower Normal   VIII: hearing Normal   IX: soft palate elevation  Normal   IX,X: gag reflex    XI: trapezius strength  5/5   XI: sternocleidomastoid strength 5/5   XI: neck extension strength  5/5   XII: tongue strength  Normal     Motor:  5/5 throughout  Normal bulk and tone  No drift   No abnormal movements    Sensory:  LT normal    Coordination:   FN, FFM and CHAMP normal  HS normal    Gait:  Normal  Romberg's negative    DTR:   2+ throughout     No Dill's    No other pathological reflexes    Laboratory/Radiology:  ry/Radiology:     CBC with Differential:    Lab Results   Component Value Date    WBC 12.7 08/11/2021    RBC 3.13 08/11/2021    HGB 10.5 08/11/2021    HCT 29.5 08/11/2021     08/11/2021    MCV 94.2 08/11/2021    MCH 33.5 08/11/2021    MCHC 35.6 08/11/2021    RDW 12.4 08/11/2021    LYMPHOPCT 8.1 08/11/2021    MONOPCT 8.0 08/11/2021    BASOPCT 0.2 08/11/2021    MONOSABS 1.01 08/11/2021    LYMPHSABS 1.03 08/11/2021    EOSABS 0.81 08/11/2021    BASOSABS 0.03 08/11/2021     CMP:    Lab Results   Component Value Date     08/11/2021    K 4.3 08/11/2021    CL 88 08/11/2021    CO2 29 08/11/2021    BUN 39 08/11/2021    CREATININE 1.9 08/11/2021    GFRAA 43 08/11/2021    LABGLOM 35 08/11/2021    GLUCOSE 130 08/11/2021    PROT 7.1 08/11/2021    LABALBU 3.8 08/11/2021    CALCIUM 10.1 08/11/2021    BILITOT 0.3 08/11/2021    ALKPHOS 91 08/11/2021    AST 24 08/11/2021    ALT 24 08/11/2021     Hepatic Function Panel:    Lab Results   Component Value Date    ALKPHOS 91 08/11/2021    ALT 24 08/11/2021    AST 24 08/11/2021    PROT 7.1 08/11/2021    BILITOT 0.3 08/11/2021    LABALBU 3.8 08/11/2021     CTH: negative for acute findings    All labs and images were personally reviewed at the time of this visit    Assessment:     Syncopal episode vs seizure like activity  --- first time episode  --- has a history of ETOH with 3-4 beers a day  --- patient felt dizzy at onset trying to hold onto something and found to have heart murmur possible cardiac related syncope  --- metabolic derangement: BUN, creatinine high and GFR is low as well   --- neuro assessment was nonfocal    Plan:     Chose not to start AED unless episode occurs again    Follow up as needed    Call with any questions or concerns      VLADIMIR Patten - CNP, APRN, FNP-C  1:04 PM  9/3/2021    I spent 45 minutes with this patient obtaining the HPI and discussing the exam with greater than 50% of the time providing counseling and education on medications and other treatment plans. All questions were answered prior to leaving my office.

## 2021-10-05 ENCOUNTER — HOSPITAL ENCOUNTER (OUTPATIENT)
Dept: ULTRASOUND IMAGING | Age: 71
Discharge: HOME | End: 2021-10-05
Payer: MEDICARE

## 2021-10-05 DIAGNOSIS — N18.32 CHRONIC KIDNEY DISEASE (CKD) STAGE G3B/A1, MODERATELY DECREASED GLOMERULAR FILTRATION RATE (GFR) BETWEEN 30-44 ML/MIN/1.73 SQUARE METER AND ALBUMINURIA CREATININE RATIO LESS THAN 30 MG/G (HCC): ICD-10-CM

## 2021-10-05 PROCEDURE — 76775 US EXAM ABDO BACK WALL LIM: CPT

## 2021-10-05 PROCEDURE — 76770 US EXAM ABDO BACK WALL COMP: CPT

## 2021-12-08 ENCOUNTER — HOSPITAL ENCOUNTER (OUTPATIENT)
Age: 71
Discharge: HOME | End: 2021-12-08
Payer: MEDICARE

## 2021-12-08 LAB
ANION GAP SERPL CALCULATED.3IONS-SCNC: 12 MMOL/L (ref 7–16)
BACTERIA: NORMAL /HPF
BASOPHILS ABSOLUTE: 0.05 E9/L (ref 0–0.2)
BASOPHILS RELATIVE PERCENT: 0.6 % (ref 0–2)
BILIRUBIN URINE: NEGATIVE
BLOOD, URINE: NEGATIVE
BUN BLDV-MCNC: 33 MG/DL (ref 6–23)
CALCIUM SERPL-MCNC: 9.7 MG/DL (ref 8.6–10.2)
CHLORIDE BLD-SCNC: 102 MMOL/L (ref 98–107)
CLARITY: CLEAR
CO2: 24 MMOL/L (ref 22–29)
COLOR: YELLOW
CREAT SERPL-MCNC: 1.6 MG/DL (ref 0.7–1.2)
CREATININE URINE: 120 MG/DL (ref 40–278)
EOSINOPHILS ABSOLUTE: 0.32 E9/L (ref 0.05–0.5)
EOSINOPHILS RELATIVE PERCENT: 3.8 % (ref 0–6)
EPITHELIAL CELLS, UA: NORMAL /HPF
GFR AFRICAN AMERICAN: 52
GFR NON-AFRICAN AMERICAN: 43 ML/MIN/1.73
GLUCOSE BLD-MCNC: 113 MG/DL (ref 74–99)
GLUCOSE URINE: NEGATIVE MG/DL
HCT VFR BLD CALC: 35.1 % (ref 37–54)
HEMOGLOBIN: 11.9 G/DL (ref 12.5–16.5)
IMMATURE GRANULOCYTES #: 0.03 E9/L
IMMATURE GRANULOCYTES %: 0.4 % (ref 0–5)
KETONES, URINE: NEGATIVE MG/DL
LEUKOCYTE ESTERASE, URINE: NEGATIVE
LYMPHOCYTES ABSOLUTE: 1.14 E9/L (ref 1.5–4)
LYMPHOCYTES RELATIVE PERCENT: 13.5 % (ref 20–42)
MAGNESIUM: 1.7 MG/DL (ref 1.6–2.6)
MCH RBC QN AUTO: 33.3 PG (ref 26–35)
MCHC RBC AUTO-ENTMCNC: 33.9 % (ref 32–34.5)
MCV RBC AUTO: 98.3 FL (ref 80–99.9)
MICROALBUMIN UR-MCNC: 17.1 MG/L
MICROALBUMIN/CREAT UR-RTO: 14.3 (ref 0–30)
MONOCYTES ABSOLUTE: 0.84 E9/L (ref 0.1–0.95)
MONOCYTES RELATIVE PERCENT: 10 % (ref 2–12)
NEUTROPHILS ABSOLUTE: 6.05 E9/L (ref 1.8–7.3)
NEUTROPHILS RELATIVE PERCENT: 71.7 % (ref 43–80)
NITRITE, URINE: NEGATIVE
PARATHYROID HORMONE INTACT: 44 PG/ML (ref 15–65)
PDW BLD-RTO: 14.1 FL (ref 11.5–15)
PH UA: 5.5 (ref 5–9)
PHOSPHORUS: 3.3 MG/DL (ref 2.5–4.5)
PLATELET # BLD: 271 E9/L (ref 130–450)
PMV BLD AUTO: 10.4 FL (ref 7–12)
POTASSIUM SERPL-SCNC: 4.9 MMOL/L (ref 3.5–5)
PROTEIN PROTEIN: 9 MG/DL (ref 0–12)
PROTEIN UA: NEGATIVE MG/DL
PROTEIN/CREAT RATIO: 0.1
PROTEIN/CREAT RATIO: 0.1 (ref 0–0.2)
RBC # BLD: 3.57 E12/L (ref 3.8–5.8)
RBC UA: NORMAL /HPF (ref 0–2)
SODIUM BLD-SCNC: 138 MMOL/L (ref 132–146)
SPECIFIC GRAVITY UA: 1.02 (ref 1–1.03)
UROBILINOGEN, URINE: 0.2 E.U./DL
VITAMIN D 25-HYDROXY: 39 NG/ML (ref 30–100)
WBC # BLD: 8.4 E9/L (ref 4.5–11.5)
WBC UA: NORMAL /HPF (ref 0–5)

## 2021-12-08 PROCEDURE — 84156 ASSAY OF PROTEIN URINE: CPT

## 2021-12-08 PROCEDURE — 80048 BASIC METABOLIC PNL TOTAL CA: CPT

## 2021-12-08 PROCEDURE — 85025 COMPLETE CBC W/AUTO DIFF WBC: CPT

## 2021-12-08 PROCEDURE — 83970 ASSAY OF PARATHORMONE: CPT

## 2021-12-08 PROCEDURE — 81001 URINALYSIS AUTO W/SCOPE: CPT

## 2021-12-08 PROCEDURE — 84100 ASSAY OF PHOSPHORUS: CPT

## 2021-12-08 PROCEDURE — 82044 UR ALBUMIN SEMIQUANTITATIVE: CPT

## 2021-12-08 PROCEDURE — 83735 ASSAY OF MAGNESIUM: CPT

## 2021-12-08 PROCEDURE — 82306 VITAMIN D 25 HYDROXY: CPT

## 2021-12-08 PROCEDURE — 36415 COLL VENOUS BLD VENIPUNCTURE: CPT

## 2021-12-08 PROCEDURE — 82570 ASSAY OF URINE CREATININE: CPT

## 2022-02-23 ENCOUNTER — HOSPITAL ENCOUNTER (OUTPATIENT)
Age: 72
Discharge: HOME | End: 2022-02-25
Payer: MEDICARE

## 2022-02-23 ENCOUNTER — HOSPITAL ENCOUNTER (OUTPATIENT)
Dept: GENERAL RADIOLOGY | Age: 72
Discharge: HOME | End: 2022-02-25
Payer: MEDICARE

## 2022-02-23 DIAGNOSIS — M25.512 LEFT SHOULDER PAIN, UNSPECIFIED CHRONICITY: ICD-10-CM

## 2022-02-23 PROCEDURE — 73030 X-RAY EXAM OF SHOULDER: CPT

## 2022-03-09 ENCOUNTER — OFFICE VISIT (OUTPATIENT)
Dept: ORTHOPEDIC SURGERY | Age: 72
End: 2022-03-09
Payer: MEDICARE

## 2022-03-09 VITALS — HEIGHT: 68 IN | WEIGHT: 176 LBS | BODY MASS INDEX: 26.67 KG/M2

## 2022-03-09 DIAGNOSIS — M75.102 NONTRAUMATIC TEAR OF LEFT ROTATOR CUFF, UNSPECIFIED TEAR EXTENT: Primary | ICD-10-CM

## 2022-03-09 PROCEDURE — 99203 OFFICE O/P NEW LOW 30 MIN: CPT | Performed by: ORTHOPAEDIC SURGERY

## 2022-03-09 NOTE — PROGRESS NOTES
Chief Complaint   Patient presents with    Shoulder Pain     Left shoulder pain increasing over the past 3-4 months. No YUE.          HPI:    Patient is 73 y.o. male complaining of left shoulder pain and weakness for 4 months. He denies a specific traumatic injury to the left shoulder. Previous treatments include rest, ice, and anti-inflammatory medication and HEP without much relief. He denies any other orthopedic complaints. ROS:    Skin: (-) rash,(-) psoriasis,(-) eczema, (-)skin cancer. Neurologic: (-)numbness, (-)tingling, (-)headaches, (-) LOC. Cardiovascular: (-) Chest pain, (-) swelling in legs/feet, (-) SOB, (-) cramping in legs/feet with walking.     All other review of systems negative except stated above or in HPI      Past Medical History:   Diagnosis Date    Diverticulitis     Gout     Hypertension     Tobacco abuse      Past Surgical History:   Procedure Laterality Date    APPENDECTOMY      CATARACT REMOVAL WITH IMPLANT Right 2 2 15    CATARACT REMOVAL WITH IMPLANT Left 4/6/15    COLONOSCOPY  7 years ago    Dr Miguel [facility]      child       Current Outpatient Medications:     allopurinol (ZYLOPRIM) 300 MG tablet, TAKE ONE TABLET BY MOUTH DAILY, Disp: , Rfl:     amoxicillin-clavulanate (AUGMENTIN) 875-125 MG per tablet, TAKE ONE TABLET BY MOUTH EVERY 12 HOURS FOR 10 DAYS, Disp: , Rfl:     pantoprazole (PROTONIX) 40 MG tablet, Take 1 tablet by mouth every morning (before breakfast) for 7 days, Disp: 7 tablet, Rfl: 0    lisinopril (PRINIVIL;ZESTRIL) 30 MG tablet, Take 30 mg by mouth daily, Disp: , Rfl:     aspirin 81 MG tablet, Take 81 mg by mouth daily, Disp: , Rfl:     hydroxychloroquine (PLAQUENIL) 200 MG tablet, Take 200 mg by mouth 2 times daily, Disp: , Rfl:     Cholecalciferol (VITAMIN D) 2000 UNITS CAPS capsule, Take 1 capsule by mouth daily, Disp: , Rfl:   No Known Allergies  Social History     Socioeconomic History    Marital status:  5' 8\" (1.727 m)   Wt 176 lb (79.8 kg)   BMI 26.76 kg/m²     GENERAL: alert, appears stated age, cooperative, no acute distress    HEENT: Head is normocephalic, atraumatic. PERRLA. SKIN: Clean, dry, intact. There is not any cellulitis or cutaneous lesions noted in the upper extremities    PULMONARY: breathing is regular and unlabored, no acute distress    CV: The bilateral upper and lower extremities are warm and well-perfused with brisk capillary refill. 2+ pulses UE and LE bilateral.     PSYCHIATRY: Pleasant mood, appropriate behavior, follows commands    NEURO: Sensation is intact distally with light touch with no alteration. Motor exam of the upper extremities show elbow flexion and extension, wrist flexion and extension, and finger abduction grossly intact 5/5. Upper extremity reflexes are bilaterally symmetrical and within normal limits. LYMPH: No lymphedema present distally in upper or lower extremity. MUSCULOSKELETAL:  Shoulder Exam:  Examination of the Left shoulder shows: There is not a deformity. There is not erythema. There is not soft tissue swelling. Deltoid region is  tender to palpation. AC Joint is  tender to palpation. Clavicle is not tender to palpation. Bicipital Groove is  tender to palpation. Pectoralis  is not tender to palpation. Scapula/ trapezius is  tender to palpation.   Right:  ROM Full, Strength: Supraspinatus 5/5, Infraspinatus 5/5, Subscapularis 5/5  Left:  /140/60, Strength: Supraspinatus 4/5, Infraspinatus 5/5, Subscapularis 5/5  Right Shoulder:  Crepitus:  no   Tenderness:  none   Effusion:   none   Impingement: negative   Empty Can:  negative   Speed's:  negative      Apprehension:  negative   Cross Arm Sign:  negative   [place]'s:  negative       Neer's:  negative      Belly Press Test:  negative      Drop Arm Test:  negative     Left Shoulder:  Crepitus:  no   Tenderness:  mild   Effusion:   non   Impingement: positive   Empty Can:  positive Speed's: positive   Apprehension:  not tested   Cross Arm Sign:  positive   [place]'s:  positive      Neer's:  positive      Belly Press Test:  negative   Drop Arm Test:  positive           Imaging:  XR SHOULDER LEFT (MIN 2 VIEWS)    Result Date: 2/23/2022  EXAMINATION: THREE XRAY VIEWS OF THE LEFT SHOULDER 2/23/2022 7:09 am COMPARISON: None. HISTORY: ORDERING SYSTEM PROVIDED HISTORY: Left shoulder pain, unspecified chronicity TECHNOLOGIST PROVIDED HISTORY: Reason for exam:->Left shoulder pain, unspecified chronicity FINDINGS: There is soft tissue calcification likely representing calcific tendinitis at the rotator cuff. Mild osteoarthritis at the glenohumeral joint. Mild-to-moderate osteoarthritis at the [facility] joint. No fracture or dislocation. Osteoarthritis. Calcific tendinitis likely at the rotator cuff. Lenny Garcia was seen today for shoulder pain. Diagnoses and all orders for this visit:    Nontraumatic tear of left rotator cuff, unspecified tear extent  -     MRI SHOULDER LEFT WO CONTRAST; Future        Patient seen and examined. X-rays reviewed. Patient has exam and history consistent with rotator cuff pathology. MRI recommended for further evaluation and management of possible rotator cuff tear.   Return to clinic after DO Alannah

## 2022-03-18 ENCOUNTER — HOSPITAL ENCOUNTER (OUTPATIENT)
Dept: MRI IMAGING | Age: 72
Discharge: HOME | End: 2022-03-20
Payer: MEDICARE

## 2022-03-18 DIAGNOSIS — M75.102 NONTRAUMATIC TEAR OF LEFT ROTATOR CUFF, UNSPECIFIED TEAR EXTENT: ICD-10-CM

## 2022-03-18 PROCEDURE — 73221 MRI JOINT UPR EXTREM W/O DYE: CPT

## 2022-03-23 ENCOUNTER — OFFICE VISIT (OUTPATIENT)
Dept: ORTHOPEDIC SURGERY | Age: 72
End: 2022-03-23
Payer: MEDICARE

## 2022-03-23 VITALS — HEIGHT: 68 IN | WEIGHT: 176 LBS | BODY MASS INDEX: 26.67 KG/M2

## 2022-03-23 DIAGNOSIS — M19.012 OSTEOARTHRITIS OF LEFT AC (ACROMIOCLAVICULAR) JOINT: ICD-10-CM

## 2022-03-23 DIAGNOSIS — M75.102 NONTRAUMATIC TEAR OF LEFT ROTATOR CUFF, UNSPECIFIED TEAR EXTENT: Primary | ICD-10-CM

## 2022-03-23 DIAGNOSIS — S46.812A RUPTURE OF LEFT SUBSCAPULARIS TENDON, INITIAL ENCOUNTER: ICD-10-CM

## 2022-03-23 DIAGNOSIS — Z71.82 EXERCISE COUNSELING: ICD-10-CM

## 2022-03-23 PROCEDURE — 99214 OFFICE O/P EST MOD 30 MIN: CPT | Performed by: ORTHOPAEDIC SURGERY

## 2022-03-23 NOTE — PROGRESS NOTES
Chief Complaint   Patient presents with    Shoulder Pain     Left shoulder MRI follow up         HPI:    Patient is 73 y.o. male complaining of left shoulder pain and weakness for 4 months. He denies a specific traumatic injury to the left shoulder. Previous treatments include rest, ice, and anti-inflammatory medication and HEP without much relief. He denies any other orthopedic complaints. Follows up after MRI. ROS:    Skin: (-) rash,(-) psoriasis,(-) eczema, (-)skin cancer. Neurologic: (-)numbness, (-)tingling, (-)headaches, (-) LOC. Cardiovascular: (-) Chest pain, (-) swelling in legs/feet, (-) SOB, (-) cramping in legs/feet with walking.     All other review of systems negative except stated above or in HPI      Past Medical History:   Diagnosis Date    Diverticulitis     Gout     Hypertension     Tobacco abuse      Past Surgical History:   Procedure Laterality Date    APPENDECTOMY      CATARACT REMOVAL WITH IMPLANT Right 2 2 15    CATARACT REMOVAL WITH IMPLANT Left 4/6/15    COLONOSCOPY  7 years ago    Dr Mathews Level      child       Current Outpatient Medications:     allopurinol (ZYLOPRIM) 300 MG tablet, TAKE ONE TABLET BY MOUTH DAILY, Disp: , Rfl:     amoxicillin-clavulanate (AUGMENTIN) 875-125 MG per tablet, TAKE ONE TABLET BY MOUTH EVERY 12 HOURS FOR 10 DAYS, Disp: , Rfl:     pantoprazole (PROTONIX) 40 MG tablet, Take 1 tablet by mouth every morning (before breakfast) for 7 days, Disp: 7 tablet, Rfl: 0    lisinopril (PRINIVIL;ZESTRIL) 30 MG tablet, Take 30 mg by mouth daily, Disp: , Rfl:     aspirin 81 MG tablet, Take 81 mg by mouth daily, Disp: , Rfl:     hydroxychloroquine (PLAQUENIL) 200 MG tablet, Take 200 mg by mouth 2 times daily, Disp: , Rfl:     Cholecalciferol (VITAMIN D) 2000 UNITS CAPS capsule, Take 1 capsule by mouth daily, Disp: , Rfl:   No Known Allergies  Social History     Socioeconomic History    Marital status:      Spouse name: Not on file    Number of children: Not on file    Years of education: Not on file    Highest education level: Not on file   Occupational History    Not on file   Tobacco Use    Smoking status: Current Every Day Smoker     Packs/day: 1.50     Years: 40.00     Pack years: 60.00    Smokeless tobacco: Never Used   Substance and Sexual Activity    Alcohol use: Yes     Comment: beer daily    Drug use: No    Sexual activity: Not on file   Other Topics Concern    Not on file   Social History Narrative    Not on file     Social Determinants of Health     Financial Resource Strain:     Difficulty of Paying Living Expenses: Not on file   Food Insecurity:     Worried About Running Out of Food in the Last Year: Not on file    Lizett of Food in the Last Year: Not on file   Transportation Needs:     Lack of Transportation (Medical): Not on file    Lack of Transportation (Non-Medical):  Not on file   Physical Activity:     Days of Exercise per Week: Not on file    Minutes of Exercise per Session: Not on file   Stress:     Feeling of Stress : Not on file   Social Connections:     Frequency of Communication with Friends and Family: Not on file    Frequency of Social Gatherings with Friends and Family: Not on file    Attends [employer] Services: Not on file    Active Member of [address] [place] [address] [employer] or Organizations: Not on file    Attends Club or Organization Meetings: Not on file    Marital Status: Not on file   Intimate Partner Violence:     Fear of Current or Ex-Partner: Not on file    Emotionally Abused: Not on file    Physically Abused: Not on file    Sexually Abused: Not on file   Housing Stability:     Unable to Pay for Housing in the Last Year: Not on file    Number of [place] in the Last Year: Not on file    Unstable Housing in the Last Year: Not on file     Family History   Problem Relation Age of Onset    COPD Mother     Diabetes Mother     Diabetes Father            Physical Exam:    Ht 5' 8\" (1.727 m) Wt 176 lb (79.8 kg)   BMI 26.76 kg/m²     GENERAL: alert, appears stated age, cooperative, no acute distress    HEENT: Head is normocephalic, atraumatic. PERRLA. SKIN: Clean, dry, intact. There is not any cellulitis or cutaneous lesions noted in the upper extremities    PULMONARY: breathing is regular and unlabored, no acute distress    CV: The bilateral upper and lower extremities are warm and well-perfused with brisk capillary refill. 2+ pulses UE and LE bilateral.     PSYCHIATRY: Pleasant mood, appropriate behavior, follows commands    NEURO: Sensation is intact distally with light touch with no alteration. Motor exam of the upper extremities show elbow flexion and extension, wrist flexion and extension, and finger abduction grossly intact 5/5. Upper extremity reflexes are bilaterally symmetrical and within normal limits. LYMPH: No lymphedema present distally in upper or lower extremity. MUSCULOSKELETAL:  Shoulder Exam:  Examination of the Left shoulder shows: There is not a deformity. There is not erythema. There is not soft tissue swelling. Deltoid region is  tender to palpation. AC Joint is  tender to palpation. Clavicle is not tender to palpation. Bicipital Groove is  tender to palpation. Pectoralis  is not tender to palpation. Scapula/ trapezius is  tender to palpation.   Right:  ROM Full, Strength: Supraspinatus 5/5, Infraspinatus 5/5, Subscapularis 5/5  Left:  /140/60, Strength: Supraspinatus 4/5, Infraspinatus 5/5, Subscapularis 5/5  Right Shoulder:  Crepitus:  no   Tenderness:  none   Effusion:   none   Impingement: negative   Empty Can:  negative   Speed's:  negative      Apprehension:  negative   Cross Arm Sign:  negative   [place]'s:  negative       Neer's:  negative      Belly Press Test:  negative      Drop Arm Test:  negative     Left Shoulder:  Crepitus:  no   Tenderness:  mild   Effusion:   non   Impingement: positive   Empty Can:  positive   Speed's: positive Apprehension:  not tested   Cross Arm Sign:  positive   [place]'s:  positive      Neer's:  positive      Belly Press Test:  negative   Drop Arm Test:  positive     no change since last visit. Imaging:  XR SHOULDER LEFT (MIN 2 VIEWS)    Result Date: 2/23/2022  EXAMINATION: THREE XRAY VIEWS OF THE LEFT SHOULDER 2/23/2022 7:09 am COMPARISON: None. HISTORY: ORDERING SYSTEM PROVIDED HISTORY: Left shoulder pain, unspecified chronicity TECHNOLOGIST PROVIDED HISTORY: Reason for exam:->Left shoulder pain, unspecified chronicity FINDINGS: There is soft tissue calcification likely representing calcific tendinitis at the rotator cuff. Mild osteoarthritis at the glenohumeral joint. Mild-to-moderate osteoarthritis at the [facility] joint. No fracture or dislocation. Osteoarthritis. Calcific tendinitis likely at the rotator cuff. MRI SHOULDER LEFT WO CONTRAST    Result Date: 3/18/2022  EXAMINATION: MRI OF THE LEFT SHOULDER WITHOUT CONTRAST   3/18/2022 8:40 am TECHNIQUE: Multiplanar multisequence MRI of the left shoulder was performed without the administration of intravenous contrast. COMPARISON: Radiographs of left shoulder from February 23, 2022 HISTORY: ORDERING SYSTEM PROVIDED HISTORY: Nontraumatic tear of left rotator cuff, unspecified tear extent FINDINGS: ROTATOR CUFF: 5 mm partial-thickness high-grade interstitial tear of the superior subscapularis tendon. This dissects into a low-grade undersurface tear of the subscapularis tendon footprint, which measures 6 mm on page 7 of series 6. Otherwise intact supraspinatus, infraspinatus and teres minor tendons and muscles. No significant disproportion muscle atrophy. BICEPS TENDON: Intact vertical and horizontal portions of the long head of the biceps tendon. LABRUM: Degenerative tearing of the anterior and superior labrum as well as the posteroinferior labrum. No paralabral cyst. GLENOHUMERAL JOINT: Physiologic amount of joint fluid.   No subchondral cysts or evidence of high-grade cartilage loss. Unremarkable alignment. AC JOINT AND ACROMIOCLAVICULAR ARCH: Acromion shape: The acromion is flat. Subacromial spur: No Arthritis: Capsular hypertrophy with osteophytes. BONE MARROW: No evidence of fracture. Normal marrow signal.  Reactive cysts at the greater and lesser tuberosities. OUTLET SPACES: Mild subacromial/subdeltoid bursitis. Unremarkable MRI appearance of the quadrilateral space. Mild narrowing of the supraspinatus outlet. High-grade partial-thickness subscapularis tendon tear. No full-thickness rotator cuff tear, retraction or muscle atrophy. Mild-to-moderate glenohumeral and acromioclavicular joint osteoarthritis with degenerative labral tears. RECOMMENDATIONS: Jonnie Bell was seen today for shoulder pain. Diagnoses and all orders for this visit:    Nontraumatic tear of left rotator cuff, unspecified tear extent    Exercise counseling    Rupture of left subscapularis tendon, initial encounter    Osteoarthritis of left AC (acromioclavicular) joint        Patient seen and examined. X-rays reviewed. Patient has exam and history consistent with rotator cuff pathology. MRI recommended for further evaluation and management of possible rotator cuff tear. MRI reviewed with patient in detail. Natural history and course discussed with patient in long discussion  Treatment options discussed with patient in detail including risks and benefits. Patient should do well with conservative management as patient would like to avoid surgery at this time. [place] pain cream    In a 15 minute assessment and discussion, patient was counseled on weight loss, healthy diet, and physical activity relating to this condition.  He was educated with options in detail including nutrition, joining a health club/ weight loss program, and use of cardio equipment such as the Arc Trainer and the importance of use as well as range of motion and HEP exercises for weight loss and general health. Patient educated about the healing rates with this condition. Patient does smoke and does have secondhand smoke exposure. In this discussion of approximately 5 minutes, patient was counseled about decrease in smoking exposure regards to healing and overall good health. Patient seems reluctant but is willing to listen to the discussion in detail. He states that he will try to cut down as much as possible and states that he will try to quit completely by the next visit. Hayden Li DO           25 minutes was spent with patient. 50% or greater was spent counseling the patient.

## 2022-04-21 ENCOUNTER — OFFICE VISIT (OUTPATIENT)
Dept: ORTHOPEDIC SURGERY | Age: 72
End: 2022-04-21
Payer: MEDICARE

## 2022-04-21 VITALS — HEIGHT: 68 IN | WEIGHT: 176 LBS | TEMPERATURE: 98 F | BODY MASS INDEX: 26.67 KG/M2

## 2022-04-21 DIAGNOSIS — M75.42 IMPINGEMENT SYNDROME OF LEFT SHOULDER: ICD-10-CM

## 2022-04-21 DIAGNOSIS — M75.102 NONTRAUMATIC TEAR OF LEFT ROTATOR CUFF, UNSPECIFIED TEAR EXTENT: Primary | ICD-10-CM

## 2022-04-21 DIAGNOSIS — M19.012 OSTEOARTHRITIS OF LEFT AC (ACROMIOCLAVICULAR) JOINT: ICD-10-CM

## 2022-04-21 PROCEDURE — 99214 OFFICE O/P EST MOD 30 MIN: CPT | Performed by: ORTHOPAEDIC SURGERY

## 2022-04-21 PROCEDURE — 20610 DRAIN/INJ JOINT/BURSA W/O US: CPT | Performed by: ORTHOPAEDIC SURGERY

## 2022-04-21 RX ORDER — TRIAMCINOLONE ACETONIDE 40 MG/ML
40 INJECTION, SUSPENSION INTRA-ARTICULAR; INTRAMUSCULAR ONCE
Status: COMPLETED | OUTPATIENT
Start: 2022-04-21 | End: 2022-04-21

## 2022-04-21 RX ADMIN — TRIAMCINOLONE ACETONIDE 40 MG: 40 INJECTION, SUSPENSION INTRA-ARTICULAR; INTRAMUSCULAR at 09:04

## 2022-04-21 NOTE — PROGRESS NOTES
Chief Complaint   Patient presents with    Shoulder Pain     Left shoulder pain follow up. Would like a cortisone injection today if possible. HPI:    Patient is 73 y.o. male complaining of left shoulder pain and weakness for 4 months. He denies a specific traumatic injury to the left shoulder. Previous treatments include rest, ice, and anti-inflammatory medication and HEP without much relief. He denies any other orthopedic complaints. Follows up after MRI. ROS:    Skin: (-) rash,(-) psoriasis,(-) eczema, (-)skin cancer. Neurologic: (-)numbness, (-)tingling, (-)headaches, (-) LOC. Cardiovascular: (-) Chest pain, (-) swelling in legs/feet, (-) SOB, (-) cramping in legs/feet with walking.     All other review of systems negative except stated above or in HPI      Past Medical History:   Diagnosis Date    Diverticulitis     Gout     Hypertension     Tobacco abuse      Past Surgical History:   Procedure Laterality Date    APPENDECTOMY      CATARACT REMOVAL WITH IMPLANT Right 2 2 15    CATARACT REMOVAL WITH IMPLANT Left 4/6/15    COLONOSCOPY  7 years ago    Dr Casie Carney      child       Current Outpatient Medications:     allopurinol (ZYLOPRIM) 300 MG tablet, TAKE ONE TABLET BY MOUTH DAILY, Disp: , Rfl:     amoxicillin-clavulanate (AUGMENTIN) 875-125 MG per tablet, TAKE ONE TABLET BY MOUTH EVERY 12 HOURS FOR 10 DAYS, Disp: , Rfl:     pantoprazole (PROTONIX) 40 MG tablet, Take 1 tablet by mouth every morning (before breakfast) for 7 days, Disp: 7 tablet, Rfl: 0    lisinopril (PRINIVIL;ZESTRIL) 30 MG tablet, Take 30 mg by mouth daily, Disp: , Rfl:     aspirin 81 MG tablet, Take 81 mg by mouth daily, Disp: , Rfl:     hydroxychloroquine (PLAQUENIL) 200 MG tablet, Take 200 mg by mouth 2 times daily, Disp: , Rfl:     Cholecalciferol (VITAMIN D) 2000 UNITS CAPS capsule, Take 1 capsule by mouth daily, Disp: , Rfl:   No Known Allergies  Social History     Socioeconomic History  Marital status:      Spouse name: Not on file    Number of children: Not on file    Years of education: Not on file    Highest education level: Not on file   Occupational History    Not on file   Tobacco Use    Smoking status: Current Every Day Smoker     Packs/day: 1.50     Years: 40.00     Pack years: 60.00    Smokeless tobacco: Never Used   Substance and Sexual Activity    Alcohol use: Yes     Comment: beer daily    Drug use: No    Sexual activity: Not on file   Other Topics Concern    Not on file   Social History Narrative    Not on file     Social Determinants of Health     Financial Resource Strain:     Difficulty of Paying Living Expenses: Not on file   Food Insecurity:     Worried About Running Out of Food in the Last Year: Not on file    Lizett of Food in the Last Year: Not on file   Transportation Needs:     Lack of Transportation (Medical): Not on file    Lack of Transportation (Non-Medical):  Not on file   Physical Activity:     Days of Exercise per Week: Not on file    Minutes of Exercise per Session: Not on file   Stress:     Feeling of Stress : Not on file   Social Connections:     Frequency of Communication with Friends and Family: Not on file    Frequency of Social Gatherings with Friends and Family: Not on file    Attends [employer] Services: Not on file    Active Member of [address] [place] [address] [email or web address] or Organizations: Not on file    Attends Club or Organization Meetings: Not on file    Marital Status: Not on file   Intimate Partner Violence:     Fear of Current or Ex-Partner: Not on file    Emotionally Abused: Not on file    Physically Abused: Not on file    Sexually Abused: Not on file   Housing Stability:     Unable to Pay for Housing in the Last Year: Not on file    Number of [place] in the Last Year: Not on file    Unstable Housing in the Last Year: Not on file     Family History   Problem Relation Age of Onset    COPD Mother     Diabetes Mother     Diabetes Father Physical Exam:    Temp 98 °F (36.7 °C)   Ht 5' 8\" (1.727 m)   Wt 176 lb (79.8 kg)   BMI 26.76 kg/m²     GENERAL: alert, appears stated age, cooperative, no acute distress    HEENT: Head is normocephalic, atraumatic. PERRLA. SKIN: Clean, dry, intact. There is not any cellulitis or cutaneous lesions noted in the lower extremities     PULMONARY: breathing is regular and unlabored, no acute distress     CV: The bilateral lower extremities are warm and well-perfused with brisk capillary refill. 2+ pulses LE bilateral.     ABDOMINAL: Nontender, nondistended     PSYCHIATRY: Pleasant mood, appropriate behavior, follows commands     NEURO: Sensation is intact distally with light touch with no alteration. Motor exam of the lower extremities show quadriceps, hamstrings, foot dorsiflexion and plantarflexion grossly intact 5/5. LYMPH: No lymphedema present distally in upper or lower extremity. MUSCULOSKELETAL:  Shoulder Exam:  Examination of the Left shoulder shows: There is not a deformity. There is not erythema. There is not soft tissue swelling. Deltoid region is  tender to palpation. AC Joint is  tender to palpation. Clavicle is not tender to palpation. Bicipital Groove is  tender to palpation. Pectoralis  is not tender to palpation. Scapula/ trapezius is  tender to palpation.   Right:  ROM Full, Strength: Supraspinatus 5/5, Infraspinatus 5/5, Subscapularis 5/5  Left:  /140/60, Strength: Supraspinatus 4/5, Infraspinatus 5/5, Subscapularis 5/5  Right Shoulder:  Crepitus:  no   Tenderness:  none   Effusion:   none   Impingement: negative   Empty Can:  negative   Speed's:  negative      Apprehension:  negative   Cross Arm Sign:  negative   [place]'s:  negative       Neer's:  negative      Belly Press Test:  negative      Drop Arm Test:  negative     Left Shoulder:  Crepitus:  no   Tenderness:  mild   Effusion:   non   Impingement: positive   Empty Can:  positive   Speed's: positive Apprehension:  not tested   Cross Arm Sign:  positive   [place]'s:  positive      Neer's:  positive      Belly Press Test:  negative   Drop Arm Test:  positive     no change since last visit. Imaging:  XR SHOULDER LEFT (MIN 2 VIEWS)    Result Date: 2/23/2022  EXAMINATION: THREE XRAY VIEWS OF THE LEFT SHOULDER 2/23/2022 7:09 am COMPARISON: None. HISTORY: ORDERING SYSTEM PROVIDED HISTORY: Left shoulder pain, unspecified chronicity TECHNOLOGIST PROVIDED HISTORY: Reason for exam:->Left shoulder pain, unspecified chronicity FINDINGS: There is soft tissue calcification likely representing calcific tendinitis at the rotator cuff. Mild osteoarthritis at the glenohumeral joint. Mild-to-moderate osteoarthritis at the [facility] joint. No fracture or dislocation. Osteoarthritis. Calcific tendinitis likely at the rotator cuff. MRI SHOULDER LEFT WO CONTRAST    Result Date: 3/18/2022  EXAMINATION: MRI OF THE LEFT SHOULDER WITHOUT CONTRAST   3/18/2022 8:40 am TECHNIQUE: Multiplanar multisequence MRI of the left shoulder was performed without the administration of intravenous contrast. COMPARISON: Radiographs of left shoulder from February 23, 2022 HISTORY: ORDERING SYSTEM PROVIDED HISTORY: Nontraumatic tear of left rotator cuff, unspecified tear extent FINDINGS: ROTATOR CUFF: 5 mm partial-thickness high-grade interstitial tear of the superior subscapularis tendon. This dissects into a low-grade undersurface tear of the subscapularis tendon footprint, which measures 6 mm on page 7 of series 6. Otherwise intact supraspinatus, infraspinatus and teres minor tendons and muscles. No significant disproportion muscle atrophy. BICEPS TENDON: Intact vertical and horizontal portions of the long head of the biceps tendon. LABRUM: Degenerative tearing of the anterior and superior labrum as well as the posteroinferior labrum. No paralabral cyst. GLENOHUMERAL JOINT: Physiologic amount of joint fluid.   No subchondral cysts or evidence of high-grade cartilage loss. Unremarkable alignment. AC JOINT AND ACROMIOCLAVICULAR ARCH: Acromion shape: The acromion is flat. Subacromial spur: No Arthritis: Capsular hypertrophy with osteophytes. BONE MARROW: No evidence of fracture. Normal marrow signal.  Reactive cysts at the greater and lesser tuberosities. OUTLET SPACES: Mild subacromial/subdeltoid bursitis. Unremarkable MRI appearance of the quadrilateral space. Mild narrowing of the supraspinatus outlet. High-grade partial-thickness subscapularis tendon tear. No full-thickness rotator cuff tear, retraction or muscle atrophy. Mild-to-moderate glenohumeral and acromioclavicular joint osteoarthritis with degenerative labral tears. RECOMMENDATIONS: Pecolia Kocher was seen today for shoulder pain. Diagnoses and all orders for this visit:    Nontraumatic tear of left rotator cuff, unspecified tear extent    Osteoarthritis of left AC (acromioclavicular) joint    Impingement syndrome of left shoulder  -     TN ARTHROCENTESIS ASPIR&/INJ MAJOR JT/BURSA W/O US    Other orders  -     triamcinolone acetonide (KENALOG-40) injection 40 mg        Patient seen and examined. X-rays reviewed. Patient has exam and history consistent with rotator cuff pathology. MRI recommended for further evaluation and management of possible rotator cuff tear. MRI reviewed with patient in detail. Natural history and course discussed with patient in long discussion  Treatment options discussed with patient in detail including risks and benefits. Patient should do well with conservative management as patient would like to avoid surgery at this time. [place] pain cream      Procedure Note Cortisone Injection to Shoulder    The left shoulder was identified as the injection site. The risk and benefits of a cortisone injection were explained and the patient consented to the injection.  Under sterile conditions, the shoulder subacromial space was injected with a mixture of 40mg of Kenelog and Marcaine without complication. A sterile bandage was applied. In a 15 minute assessment and discussion, patient was counseled on weight loss, healthy diet, and physical activity relating to this condition. He was educated with options in detail including nutrition, joining a health club/ weight loss program, and use of cardio equipment such as the Arc Trainer and the importance of use as well as range of motion and HEP exercises for weight loss and general health. Patient educated about the healing rates with this condition. Patient does smoke and does have secondhand smoke exposure. In this discussion of approximately 5 minutes, patient was counseled about decrease in smoking exposure regards to healing and overall good health. Patient seems reluctant but is willing to listen to the discussion in detail. He states that he will try to cut down as much as possible and states that he will try to quit completely by the next visit. Gracy Hampton DO           25 minutes was spent with patient. 50% or greater was spent counseling the patient.

## 2022-06-01 ENCOUNTER — HOSPITAL ENCOUNTER (OUTPATIENT)
Age: 72
Discharge: HOME | End: 2022-06-01
Payer: MEDICARE

## 2022-06-01 LAB
ALBUMIN SERPL-MCNC: 4.6 G/DL (ref 3.5–5.2)
ALP BLD-CCNC: 98 U/L (ref 40–129)
ALT SERPL-CCNC: 18 U/L (ref 0–40)
ANION GAP SERPL CALCULATED.3IONS-SCNC: 10 MMOL/L (ref 7–16)
AST SERPL-CCNC: 22 U/L (ref 0–39)
BACTERIA: NORMAL /HPF
BASOPHILS ABSOLUTE: 0.05 E9/L (ref 0–0.2)
BASOPHILS RELATIVE PERCENT: 0.4 % (ref 0–2)
BILIRUB SERPL-MCNC: 0.2 MG/DL (ref 0–1.2)
BILIRUBIN URINE: NEGATIVE
BLOOD, URINE: NEGATIVE
BUN BLDV-MCNC: 43 MG/DL (ref 6–23)
CALCIUM SERPL-MCNC: 9.7 MG/DL (ref 8.6–10.2)
CHLORIDE BLD-SCNC: 106 MMOL/L (ref 98–107)
CLARITY: CLEAR
CO2: 22 MMOL/L (ref 22–29)
COLOR: YELLOW
CREAT SERPL-MCNC: 1.6 MG/DL (ref 0.7–1.2)
EOSINOPHILS ABSOLUTE: 0.2 E9/L (ref 0.05–0.5)
EOSINOPHILS RELATIVE PERCENT: 1.7 % (ref 0–6)
EPITHELIAL CELLS, UA: NORMAL /HPF
GFR AFRICAN AMERICAN: 52
GFR NON-AFRICAN AMERICAN: 43 ML/MIN/1.73
GLUCOSE BLD-MCNC: 110 MG/DL (ref 74–99)
GLUCOSE URINE: NEGATIVE MG/DL
HCT VFR BLD CALC: 36.2 % (ref 37–54)
HEMOGLOBIN: 11.9 G/DL (ref 12.5–16.5)
IMMATURE GRANULOCYTES #: 0.04 E9/L
IMMATURE GRANULOCYTES %: 0.3 % (ref 0–5)
KETONES, URINE: NEGATIVE MG/DL
LEUKOCYTE ESTERASE, URINE: NEGATIVE
LYMPHOCYTES ABSOLUTE: 1.66 E9/L (ref 1.5–4)
LYMPHOCYTES RELATIVE PERCENT: 14.1 % (ref 20–42)
MAGNESIUM: 1.6 MG/DL (ref 1.6–2.6)
MCH RBC QN AUTO: 32.9 PG (ref 26–35)
MCHC RBC AUTO-ENTMCNC: 32.9 % (ref 32–34.5)
MCV RBC AUTO: 100 FL (ref 80–99.9)
MONOCYTES ABSOLUTE: 1.03 E9/L (ref 0.1–0.95)
MONOCYTES RELATIVE PERCENT: 8.8 % (ref 2–12)
NEUTROPHILS ABSOLUTE: 8.78 E9/L (ref 1.8–7.3)
NEUTROPHILS RELATIVE PERCENT: 74.7 % (ref 43–80)
NITRITE, URINE: NEGATIVE
PARATHYROID HORMONE INTACT: 32 PG/ML (ref 15–65)
PDW BLD-RTO: 13.2 FL (ref 11.5–15)
PH UA: 5 (ref 5–9)
PHOSPHORUS: 3.5 MG/DL (ref 2.5–4.5)
PLATELET # BLD: 300 E9/L (ref 130–450)
PMV BLD AUTO: 9.7 FL (ref 7–12)
POTASSIUM SERPL-SCNC: 4.8 MMOL/L (ref 3.5–5)
PROTEIN PROTEIN: 8 MG/DL (ref 0–12)
PROTEIN UA: NEGATIVE MG/DL
PROTEIN/CREAT RATIO: 0.1
PROTEIN/CREAT RATIO: 0.1 (ref 0–0.2)
RBC # BLD: 3.62 E12/L (ref 3.8–5.8)
RBC UA: NORMAL /HPF (ref 0–2)
SODIUM BLD-SCNC: 138 MMOL/L (ref 132–146)
SPECIFIC GRAVITY UA: 1.02 (ref 1–1.03)
TOTAL PROTEIN: 7.4 G/DL (ref 6.4–8.3)
UROBILINOGEN, URINE: 0.2 E.U./DL
VITAMIN D 25-HYDROXY: 33 NG/ML (ref 30–100)
WBC # BLD: 11.8 E9/L (ref 4.5–11.5)
WBC UA: NORMAL /HPF (ref 0–5)

## 2022-06-01 PROCEDURE — 85025 COMPLETE CBC W/AUTO DIFF WBC: CPT

## 2022-06-01 PROCEDURE — 81001 URINALYSIS AUTO W/SCOPE: CPT

## 2022-06-01 PROCEDURE — 84156 ASSAY OF PROTEIN URINE: CPT

## 2022-06-01 PROCEDURE — 36415 COLL VENOUS BLD VENIPUNCTURE: CPT

## 2022-06-01 PROCEDURE — 82570 ASSAY OF URINE CREATININE: CPT

## 2022-06-01 PROCEDURE — 82044 UR ALBUMIN SEMIQUANTITATIVE: CPT

## 2022-06-01 PROCEDURE — 80053 COMPREHEN METABOLIC PANEL: CPT

## 2022-06-01 PROCEDURE — 83970 ASSAY OF PARATHORMONE: CPT

## 2022-06-01 PROCEDURE — 83735 ASSAY OF MAGNESIUM: CPT

## 2022-06-01 PROCEDURE — 84100 ASSAY OF PHOSPHORUS: CPT

## 2022-06-01 PROCEDURE — 82306 VITAMIN D 25 HYDROXY: CPT

## 2022-06-02 LAB
CREATININE URINE: 82 MG/DL (ref 40–278)
MICROALBUMIN UR-MCNC: 14.5 MG/L
MICROALBUMIN/CREAT UR-RTO: 17.7 (ref 0–30)

## 2022-07-19 DIAGNOSIS — M25.511 RIGHT SHOULDER PAIN, UNSPECIFIED CHRONICITY: Primary | ICD-10-CM

## 2022-07-20 ENCOUNTER — OFFICE VISIT (OUTPATIENT)
Dept: ORTHOPEDIC SURGERY | Age: 72
End: 2022-07-20
Payer: MEDICARE

## 2022-07-20 VITALS — HEIGHT: 68 IN | WEIGHT: 175 LBS | BODY MASS INDEX: 26.52 KG/M2

## 2022-07-20 DIAGNOSIS — M75.102 NONTRAUMATIC TEAR OF LEFT ROTATOR CUFF, UNSPECIFIED TEAR EXTENT: ICD-10-CM

## 2022-07-20 DIAGNOSIS — M75.41 IMPINGEMENT SYNDROME OF RIGHT SHOULDER: ICD-10-CM

## 2022-07-20 DIAGNOSIS — M06.9 RHEUMATOID ARTHRITIS, INVOLVING UNSPECIFIED SITE, UNSPECIFIED WHETHER RHEUMATOID FACTOR PRESENT (HCC): Primary | ICD-10-CM

## 2022-07-20 PROCEDURE — 99214 OFFICE O/P EST MOD 30 MIN: CPT | Performed by: ORTHOPAEDIC SURGERY

## 2022-07-20 PROCEDURE — 1123F ACP DISCUSS/DSCN MKR DOCD: CPT | Performed by: ORTHOPAEDIC SURGERY

## 2022-07-20 PROCEDURE — 20610 DRAIN/INJ JOINT/BURSA W/O US: CPT | Performed by: ORTHOPAEDIC SURGERY

## 2022-07-20 RX ORDER — BETAMETHASONE SODIUM PHOSPHATE AND BETAMETHASONE ACETATE 3; 3 MG/ML; MG/ML
12 INJECTION, SUSPENSION INTRA-ARTICULAR; INTRALESIONAL; INTRAMUSCULAR; SOFT TISSUE ONCE
Status: COMPLETED | OUTPATIENT
Start: 2022-07-20 | End: 2022-07-25

## 2022-07-20 NOTE — PROGRESS NOTES
Chief Complaint   Patient presents with    Shoulder Pain     rt         HPI:    Patient is 81 y.o. male complaining of a new issue of right shoulder pain and weakness for 6 weeks. He denies a specific traumatic injury to the right shoulder. Previous treatments include rest, ice, and anti-inflammatory medication and HEP without much relief. He denies any other orthopedic complaints besides return of left shoulder pain which has been treated in the past.      ROS:    Skin: (-) rash,(-) psoriasis,(-) eczema, (-)skin cancer. Neurologic: (-)numbness, (-)tingling, (-)headaches, (-) LOC. Cardiovascular: (-) Chest pain, (-) swelling in legs/feet, (-) SOB, (-) cramping in legs/feet with walking.     All other review of systems negative except stated above or in HPI      Past Medical History:   Diagnosis Date    Diverticulitis     Gout     Hypertension     Tobacco abuse      Past Surgical History:   Procedure Laterality Date    APPENDECTOMY      CATARACT EXTRACTION W/  INTRAOCULAR LENS IMPLANT Right 2 2 15    CATARACT EXTRACTION W/  INTRAOCULAR LENS IMPLANT Left 4/6/15    COLONOSCOPY  7 years ago    Dr Sweetie Lorenzo      child       Current Outpatient Medications:     allopurinol (ZYLOPRIM) 300 MG tablet, TAKE ONE TABLET BY MOUTH DAILY, Disp: , Rfl:     amoxicillin-clavulanate (AUGMENTIN) 875-125 MG per tablet, TAKE ONE TABLET BY MOUTH EVERY 12 HOURS FOR 10 DAYS, Disp: , Rfl:     lisinopril (PRINIVIL;ZESTRIL) 30 MG tablet, Take 30 mg by mouth daily, Disp: , Rfl:     aspirin 81 MG tablet, Take 81 mg by mouth daily, Disp: , Rfl:     hydroxychloroquine (PLAQUENIL) 200 MG tablet, Take 200 mg by mouth 2 times daily, Disp: , Rfl:     Cholecalciferol (VITAMIN D) 2000 UNITS CAPS capsule, Take 1 capsule by mouth daily, Disp: , Rfl:     pantoprazole (PROTONIX) 40 MG tablet, Take 1 tablet by mouth every morning (before breakfast) for 7 days, Disp: 7 tablet, Rfl: 0  No Known Allergies  Social History Socioeconomic History    Marital status:      Spouse name: Not on file    Number of children: Not on file    Years of education: Not on file    Highest education level: Not on file   Occupational History    Not on file   Tobacco Use    Smoking status: Every Day     Packs/day: 1.50     Years: 40.00     Pack years: 60.00     Types: Cigarettes    Smokeless tobacco: Never   Substance and Sexual Activity    Alcohol use: Yes     Comment: beer daily    Drug use: No    Sexual activity: Not on file   Other Topics Concern    Not on file   Social History Narrative    Not on file     Social Determinants of Health     Financial Resource Strain: Not on file   Food Insecurity: Not on file   Transportation Needs: Not on file   Physical Activity: Not on file   Stress: Not on file   Social Connections: Not on file   Intimate Partner Violence: Not on file   Housing Stability: Not on file     Family History   Problem Relation Age of Onset    COPD Mother     Diabetes Mother     Diabetes Father            Physical Exam:    Ht 5' 8\" (1.727 m)   Wt 175 lb (79.4 kg)   BMI 26.61 kg/m²     GENERAL: alert, appears stated age, cooperative, no acute distress    HEENT: Head is normocephalic, atraumatic. PERRLA. SKIN: Clean, dry, intact. There is not any cellulitis or cutaneous lesions noted in the upper extremities    PULMONARY: breathing is regular and unlabored, no acute distress    CV: The bilateral upper and lower extremities are warm and well-perfused with brisk capillary refill. 2+ pulses UE and LE bilateral.     PSYCHIATRY: Pleasant mood, appropriate behavior, follows commands    NEURO: Sensation is intact distally with light touch with no alteration. Motor exam of the upper extremities show elbow flexion and extension, wrist flexion and extension, and finger abduction grossly intact 5/5. Upper extremity reflexes are bilaterally symmetrical and within normal limits.     LYMPH: No lymphedema present distally in upper or lower extremity. MUSCULOSKELETAL:  Shoulder Exam:  Examination of the right shoulder shows: There is not a deformity. There is not erythema. There is not soft tissue swelling. Deltoid region is  tender to palpation. AC Joint is  tender to palpation. Clavicle is not tender to palpation. Bicipital Groove is  tender to palpation. Pectoralis  is not tender to palpation. Scapula/ trapezius is  tender to palpation. Left:  ROM Full, Strength: Supraspinatus 5/5, Infraspinatus 5/5, Subscapularis 5/5  Right:  /140/60, Strength: Supraspinatus 4/5, Infraspinatus 5/5, Subscapularis 5/5    Right Shoulder:  Crepitus:  no   Tenderness:  mild   Effusion:   non   Impingement: positive   Empty Can:  positive   Speed's: positive   Apprehension:  not tested   Cross Arm Sign:  positive   Pasco's:  positive      Neer's:  positive      Belly Press Test:  negative   Drop Arm Test:  positive     Left shoulder:  Crepitus:  no   Tenderness:  mild   Effusion:   non   Impingement: positive   Empty Can:  positive   Speed's: positive   Apprehension:  not tested   Cross Arm Sign:  positive   Pasco's:  positive      Neer's:  positive      Belly Press Test:  negative   Drop Arm Test:  positive         Imaging:  XR SHOULDER RIGHT (MIN 2 VIEWS)    Result Date: 7/20/2022  EXAMINATION: THREE XRAY VIEWS OF THE RIGHT SHOULDER 7/20/2022 8:29 am COMPARISON: None. HISTORY: ORDERING SYSTEM PROVIDED HISTORY: Right shoulder pain, unspecified chronicity FINDINGS: Mild AC joint osteoarthritis. No fracture or dislocation. Normal soft tissues. Mild AC joint osteoarthritis. Dara Julian was seen today for shoulder pain.     Diagnoses and all orders for this visit:    Rheumatoid arthritis, involving unspecified site, unspecified whether rheumatoid factor present ([facility]  - [id number], Rheumatology, [place]    Nontraumatic tear of left rotator cuff, unspecified tear extent  -     [place] ARTHROCENTESIS ASPIR&/INJ MAJOR JT/BURSA W/O US    Impingement syndrome of right shoulder  -     [place] ARTHROCENTESIS ASPIR&/INJ MAJOR JT/BURSA W/O US    Other orders  -     betamethasone acetate-betamethasone sodium phosphate (CELESTONE) injection 12 mg  -     betamethasone acetate-betamethasone sodium phosphate (CELESTONE) injection 12 mg      Patient seen and examined. X-rays reviewed. Patient has exam and history consistent with rotator cuff pathology. MRI recommended for further evaluation and management of possible rotator cuff tear. However patient is requesting cortisone injections today. Procedure Note Cortisone Injection to Shoulder    The bilateral shoulder was identified as the injection site. The risk and benefits of a cortisone injection were explained and the patient consented to the injection. Under sterile conditions, the shoulder subacromial space was injected with a mixture of 12 mg of Celestone and Marcaine without complication. A sterile bandage was applied. In a 15 minute assessment and discussion, patient was counseled on weight loss, healthy diet, and physical activity relating to this condition. He was educated with options in detail including nutrition, joining a health club/ weight loss program, and use of cardio equipment such as the Arc Trainer and the importance of use as well as range of motion and HEP exercises for weight loss and general health. Michelle Doan DO  7/20/22        25 minutes was spent with patient. 50% or greater was spent counseling the patient.

## 2022-07-25 RX ADMIN — BETAMETHASONE SODIUM PHOSPHATE AND BETAMETHASONE ACETATE 12 MG: 3; 3 INJECTION, SUSPENSION INTRA-ARTICULAR; INTRALESIONAL; INTRAMUSCULAR; SOFT TISSUE at 08:03

## 2022-07-29 ENCOUNTER — OFFICE VISIT (OUTPATIENT)
Dept: ORTHOPEDIC SURGERY | Age: 72
End: 2022-07-29
Payer: MEDICARE

## 2022-07-29 VITALS — HEIGHT: 68 IN | TEMPERATURE: 98 F | WEIGHT: 175 LBS | BODY MASS INDEX: 26.52 KG/M2

## 2022-07-29 DIAGNOSIS — M17.11 PRIMARY OSTEOARTHRITIS OF RIGHT KNEE: Primary | ICD-10-CM

## 2022-07-29 DIAGNOSIS — M25.562 ACUTE PAIN OF LEFT KNEE: Primary | ICD-10-CM

## 2022-07-29 DIAGNOSIS — M25.561 ACUTE PAIN OF RIGHT KNEE: ICD-10-CM

## 2022-07-29 DIAGNOSIS — M17.12 PRIMARY OSTEOARTHRITIS OF LEFT KNEE: ICD-10-CM

## 2022-07-29 PROCEDURE — 1123F ACP DISCUSS/DSCN MKR DOCD: CPT | Performed by: ORTHOPAEDIC SURGERY

## 2022-07-29 PROCEDURE — 99213 OFFICE O/P EST LOW 20 MIN: CPT | Performed by: ORTHOPAEDIC SURGERY

## 2022-07-29 NOTE — PROGRESS NOTES
Marital status:      Spouse name: Not on file    Number of children: Not on file    Years of education: Not on file    Highest education level: Not on file   Occupational History    Not on file   Tobacco Use    Smoking status: Every Day     Packs/day: 1.50     Years: 40.00     Pack years: 60.00     Types: Cigarettes    Smokeless tobacco: Never   Substance and Sexual Activity    Alcohol use: Yes     Comment: beer daily    Drug use: No    Sexual activity: Not on file   Other Topics Concern    Not on file   Social History Narrative    Not on file     Social Determinants of Health     Financial Resource Strain: Not on file   Food Insecurity: Not on file   Transportation Needs: Not on file   Physical Activity: Not on file   Stress: Not on file   Social Connections: Not on file   Intimate Partner Violence: Not on file   Housing Stability: Not on file     Family History   Problem Relation Age of Onset    COPD Mother     Diabetes Mother     Diabetes Father            Physical Exam:    Temp 98 °F (36.7 °C)   Ht 5' 8\" (1.727 m)   Wt 175 lb (79.4 kg)   BMI 26.61 kg/m²     GENERAL: alert, appears stated age, cooperative, no acute distress    HEENT: Head is normocephalic, atraumatic. PERRLA. SKIN: Clean, dry, intact. There is no cellulitis or cutaneous lesions noted in the lower extremities    PULMONARY: breathing is regular and unlabored, no acute distress    CV: The bilateral upper and lower extremities are warm and well-perfused with brisk capillary refill. 2+ pulses UE and LE bilateral.     PSYCHIATRY: Pleasant mood, appropriate behavior, follows commands    NEURO: Sensation is intact distally with light touch with no alteration. Motor exam of the lower extremities show quadriceps, hamstrings, foot dorsiflexion and plantarflexion grossly intact 5/5. LYMPH: No lymphedema present distally in upper or lower extremity. MUSCULOSKELETAL:  Left knee Exam:    mild effusion noted.   No erythema/induration/fluctuance. Posterior medial joint line TTP. Stable to varus and valgus at 0 and 30 degrees of flexion. Negative Lachman's and posterior drawer. Negative patellar grind test and J sign. Compartments soft and compressible throughout leg. Active range of motion 0-120 with pain. Right knee Exam:    mild effusion noted. No erythema/induration/fluctuance. Posterior medial joint line TTP. Stable to varus and valgus at 0 and 30 degrees of flexion. Negative Lachman's and posterior drawer. Negative patellar grind test and J sign. Compartments soft and compressible throughout leg. Active range of motion 0-120 with pain. Imaging:  XR KNEE LEFT (MIN 4 VIEWS)    Result Date: 7/29/2022  EXAMINATION: FOUR XRAY VIEWS OF THE LEFT KNEE; FOUR XRAY VIEWS OF THE RIGHT KNEE 7/29/2022 7:40 am COMPARISON: None. HISTORY: ORDERING SYSTEM PROVIDED HISTORY: Acute pain of left knee; ORDERING SYSTEM PROVIDED HISTORY: Acute pain of right knee FINDINGS: Right knee: Multiple intra-articular loose bodies in the suprapatellar recess of the knee joint with moderate joint effusion as well. There is mild osteoarthritis which is best appreciated at the patellofemoral compartment. No fracture or dislocation. Left knee: Single suspected intra-articular loose body. Likely phleboliths in the distal thigh. There is osteoarthritis at the medial weight-bearing and patellofemoral compartments which is mild. No fracture or dislocation. Intra-articular loose bodies which are numerous on the right with right knee joint effusion. Suspected single intra-articular loose body on the left. Bilateral osteoarthritis. XR KNEE RIGHT (MIN 4 VIEWS)    Result Date: 7/29/2022  EXAMINATION: FOUR XRAY VIEWS OF THE LEFT KNEE; FOUR XRAY VIEWS OF THE RIGHT KNEE 7/29/2022 7:40 am COMPARISON: None.  HISTORY: ORDERING SYSTEM PROVIDED HISTORY: Acute pain of left knee; ORDERING SYSTEM PROVIDED HISTORY: Acute pain of right knee FINDINGS: Right knee: Multiple intra-articular loose bodies in the suprapatellar recess of the knee joint with moderate joint effusion as well. There is mild osteoarthritis which is best appreciated at the patellofemoral compartment. No fracture or dislocation. Left knee: Single suspected intra-articular loose body. Likely phleboliths in the distal thigh. There is osteoarthritis at the medial weight-bearing and patellofemoral compartments which is mild. No fracture or dislocation. Intra-articular loose bodies which are numerous on the right with right knee joint effusion. Suspected single intra-articular loose body on the left. Bilateral osteoarthritis. Kristan Hamilton was seen today for knee pain. Diagnoses and all orders for this visit:    Primary osteoarthritis of right knee    Primary osteoarthritis of left knee      Patient seen and examined. Imaging reviewed with patient in detail. Natural history and course discussed with patient in long discussion  Treatment options discussed with patient in detail including risks and benefits. Patient should do well with conservative management as patient would like to avoid surgery at this time.              [facility] Cent[facility]helle Holstein, DO

## 2022-08-08 ENCOUNTER — TELEPHONE (OUTPATIENT)
Dept: ADMINISTRATIVE | Age: 72
End: 2022-08-08

## 2022-08-11 ENCOUNTER — APPOINTMENT (OUTPATIENT)
Dept: GENERAL RADIOLOGY | Age: 72
DRG: 521 | End: 2022-08-11
Payer: MEDICARE

## 2022-08-11 ENCOUNTER — HOSPITAL ENCOUNTER (INPATIENT)
Age: 72
LOS: 6 days | Discharge: ANOTHER HOSPITAL | DRG: 521 | End: 2022-08-17
Attending: EMERGENCY MEDICINE | Admitting: INTERNAL MEDICINE
Payer: MEDICARE

## 2022-08-11 DIAGNOSIS — S72.001A CLOSED RIGHT HIP FRACTURE, INITIAL ENCOUNTER (HCC): Primary | ICD-10-CM

## 2022-08-11 DIAGNOSIS — Z87.81 S/P RIGHT HIP FRACTURE: ICD-10-CM

## 2022-08-11 LAB
ABO/RH: NORMAL
ALBUMIN SERPL-MCNC: 4 G/DL (ref 3.5–5.2)
ALP BLD-CCNC: 88 U/L (ref 40–129)
ALT SERPL-CCNC: 15 U/L (ref 0–40)
ANION GAP SERPL CALCULATED.3IONS-SCNC: 14 MMOL/L (ref 7–16)
ANTIBODY SCREEN: NORMAL
APTT: 29.3 SEC (ref 24.5–35.1)
AST SERPL-CCNC: 24 U/L (ref 0–39)
BASOPHILS ABSOLUTE: 0.02 E9/L (ref 0–0.2)
BASOPHILS RELATIVE PERCENT: 0.1 % (ref 0–2)
BILIRUB SERPL-MCNC: 0.3 MG/DL (ref 0–1.2)
BUN BLDV-MCNC: 35 MG/DL (ref 6–23)
CALCIUM SERPL-MCNC: 9.1 MG/DL (ref 8.6–10.2)
CHLORIDE BLD-SCNC: 101 MMOL/L (ref 98–107)
CO2: 20 MMOL/L (ref 22–29)
CREAT SERPL-MCNC: 1.4 MG/DL (ref 0.7–1.2)
EOSINOPHILS ABSOLUTE: 0.01 E9/L (ref 0.05–0.5)
EOSINOPHILS RELATIVE PERCENT: 0.1 % (ref 0–6)
GFR AFRICAN AMERICAN: >60
GFR NON-AFRICAN AMERICAN: 50 ML/MIN/1.73
GLUCOSE BLD-MCNC: 135 MG/DL (ref 74–99)
HCT VFR BLD CALC: 31.2 % (ref 37–54)
HEMOGLOBIN: 10.3 G/DL (ref 12.5–16.5)
IMMATURE GRANULOCYTES #: 0.08 E9/L
IMMATURE GRANULOCYTES %: 0.5 % (ref 0–5)
INR BLD: 1
LYMPHOCYTES ABSOLUTE: 0.61 E9/L (ref 1.5–4)
LYMPHOCYTES RELATIVE PERCENT: 3.8 % (ref 20–42)
MCH RBC QN AUTO: 32.6 PG (ref 26–35)
MCHC RBC AUTO-ENTMCNC: 33 % (ref 32–34.5)
MCV RBC AUTO: 98.7 FL (ref 80–99.9)
METER GLUCOSE: 101 MG/DL (ref 74–99)
MONOCYTES ABSOLUTE: 0.76 E9/L (ref 0.1–0.95)
MONOCYTES RELATIVE PERCENT: 4.7 % (ref 2–12)
NEUTROPHILS ABSOLUTE: 14.7 E9/L (ref 1.8–7.3)
NEUTROPHILS RELATIVE PERCENT: 90.8 % (ref 43–80)
PDW BLD-RTO: 13 FL (ref 11.5–15)
PLATELET # BLD: 291 E9/L (ref 130–450)
PMV BLD AUTO: 9.5 FL (ref 7–12)
POTASSIUM REFLEX MAGNESIUM: 4.6 MMOL/L (ref 3.5–5)
PROTHROMBIN TIME: 10.9 SEC (ref 9.3–12.4)
RBC # BLD: 3.16 E12/L (ref 3.8–5.8)
SODIUM BLD-SCNC: 135 MMOL/L (ref 132–146)
TOTAL PROTEIN: 6.9 G/DL (ref 6.4–8.3)
WBC # BLD: 16.2 E9/L (ref 4.5–11.5)

## 2022-08-11 PROCEDURE — 93005 ELECTROCARDIOGRAM TRACING: CPT

## 2022-08-11 PROCEDURE — 6360000002 HC RX W HCPCS

## 2022-08-11 PROCEDURE — 99231 SBSQ HOSP IP/OBS SF/LOW 25: CPT | Performed by: ORTHOPAEDIC SURGERY

## 2022-08-11 PROCEDURE — 99285 EMERGENCY DEPT VISIT HI MDM: CPT

## 2022-08-11 PROCEDURE — 85730 THROMBOPLASTIN TIME PARTIAL: CPT

## 2022-08-11 PROCEDURE — 86900 BLOOD TYPING SEROLOGIC ABO: CPT

## 2022-08-11 PROCEDURE — 80053 COMPREHEN METABOLIC PANEL: CPT

## 2022-08-11 PROCEDURE — 86901 BLOOD TYPING SEROLOGIC RH(D): CPT

## 2022-08-11 PROCEDURE — 1200000000 HC SEMI PRIVATE

## 2022-08-11 PROCEDURE — 86850 RBC ANTIBODY SCREEN: CPT

## 2022-08-11 PROCEDURE — 36415 COLL VENOUS BLD VENIPUNCTURE: CPT

## 2022-08-11 PROCEDURE — 71045 X-RAY EXAM CHEST 1 VIEW: CPT

## 2022-08-11 PROCEDURE — 96374 THER/PROPH/DIAG INJ IV PUSH: CPT

## 2022-08-11 PROCEDURE — 6370000000 HC RX 637 (ALT 250 FOR IP): Performed by: INTERNAL MEDICINE

## 2022-08-11 PROCEDURE — 73502 X-RAY EXAM HIP UNI 2-3 VIEWS: CPT

## 2022-08-11 PROCEDURE — 85610 PROTHROMBIN TIME: CPT

## 2022-08-11 PROCEDURE — 85025 COMPLETE CBC W/AUTO DIFF WBC: CPT

## 2022-08-11 PROCEDURE — 6360000002 HC RX W HCPCS: Performed by: INTERNAL MEDICINE

## 2022-08-11 PROCEDURE — 2580000003 HC RX 258: Performed by: INTERNAL MEDICINE

## 2022-08-11 PROCEDURE — 82962 GLUCOSE BLOOD TEST: CPT

## 2022-08-11 RX ORDER — ALLOPURINOL 100 MG/1
100 TABLET ORAL DAILY
Status: ON HOLD | COMMUNITY
End: 2022-08-17 | Stop reason: HOSPADM

## 2022-08-11 RX ORDER — PANTOPRAZOLE SODIUM 40 MG/1
40 TABLET, DELAYED RELEASE ORAL
Status: DISCONTINUED | OUTPATIENT
Start: 2022-08-12 | End: 2022-08-17 | Stop reason: HOSPADM

## 2022-08-11 RX ORDER — ACETAMINOPHEN 325 MG/1
650 TABLET ORAL EVERY 6 HOURS PRN
Status: DISCONTINUED | OUTPATIENT
Start: 2022-08-11 | End: 2022-08-17 | Stop reason: HOSPADM

## 2022-08-11 RX ORDER — OXYCODONE HYDROCHLORIDE 5 MG/1
5 TABLET ORAL EVERY 4 HOURS PRN
Status: DISCONTINUED | OUTPATIENT
Start: 2022-08-11 | End: 2022-08-17 | Stop reason: HOSPADM

## 2022-08-11 RX ORDER — MORPHINE SULFATE 2 MG/ML
2 INJECTION, SOLUTION INTRAMUSCULAR; INTRAVENOUS
Status: DISCONTINUED | OUTPATIENT
Start: 2022-08-11 | End: 2022-08-11

## 2022-08-11 RX ORDER — ALLOPURINOL 100 MG/1
100 TABLET ORAL DAILY
Status: DISCONTINUED | OUTPATIENT
Start: 2022-08-11 | End: 2022-08-12

## 2022-08-11 RX ORDER — HYDROMORPHONE HYDROCHLORIDE 1 MG/ML
1 INJECTION, SOLUTION INTRAMUSCULAR; INTRAVENOUS; SUBCUTANEOUS
Status: COMPLETED | OUTPATIENT
Start: 2022-08-11 | End: 2022-08-12

## 2022-08-11 RX ORDER — HYDROXYCHLOROQUINE SULFATE 200 MG/1
200 TABLET, FILM COATED ORAL DAILY
Status: DISCONTINUED | OUTPATIENT
Start: 2022-08-11 | End: 2022-08-17 | Stop reason: HOSPADM

## 2022-08-11 RX ORDER — DEXTROSE MONOHYDRATE 100 MG/ML
INJECTION, SOLUTION INTRAVENOUS CONTINUOUS PRN
Status: DISCONTINUED | OUTPATIENT
Start: 2022-08-11 | End: 2022-08-17 | Stop reason: HOSPADM

## 2022-08-11 RX ORDER — 0.9 % SODIUM CHLORIDE 0.9 %
1000 INTRAVENOUS SOLUTION INTRAVENOUS ONCE
Status: COMPLETED | OUTPATIENT
Start: 2022-08-11 | End: 2022-08-11

## 2022-08-11 RX ORDER — ACETAMINOPHEN 650 MG/1
650 SUPPOSITORY RECTAL EVERY 6 HOURS PRN
Status: DISCONTINUED | OUTPATIENT
Start: 2022-08-11 | End: 2022-08-17 | Stop reason: HOSPADM

## 2022-08-11 RX ORDER — POLYETHYLENE GLYCOL 3350 17 G/17G
17 POWDER, FOR SOLUTION ORAL DAILY PRN
Status: DISCONTINUED | OUTPATIENT
Start: 2022-08-11 | End: 2022-08-17 | Stop reason: HOSPADM

## 2022-08-11 RX ORDER — MORPHINE SULFATE 4 MG/ML
4 INJECTION, SOLUTION INTRAMUSCULAR; INTRAVENOUS
Status: DISCONTINUED | OUTPATIENT
Start: 2022-08-11 | End: 2022-08-11

## 2022-08-11 RX ORDER — SODIUM CHLORIDE 9 MG/ML
INJECTION, SOLUTION INTRAVENOUS PRN
Status: DISCONTINUED | OUTPATIENT
Start: 2022-08-11 | End: 2022-08-15

## 2022-08-11 RX ORDER — OXYCODONE HYDROCHLORIDE 5 MG/1
10 TABLET ORAL EVERY 4 HOURS PRN
Status: DISCONTINUED | OUTPATIENT
Start: 2022-08-11 | End: 2022-08-17 | Stop reason: HOSPADM

## 2022-08-11 RX ORDER — SODIUM CHLORIDE 0.9 % (FLUSH) 0.9 %
5-40 SYRINGE (ML) INJECTION EVERY 12 HOURS SCHEDULED
Status: DISCONTINUED | OUTPATIENT
Start: 2022-08-11 | End: 2022-08-15

## 2022-08-11 RX ORDER — HYDROMORPHONE HYDROCHLORIDE 1 MG/ML
0.5 INJECTION, SOLUTION INTRAMUSCULAR; INTRAVENOUS; SUBCUTANEOUS EVERY 4 HOURS PRN
Status: COMPLETED | OUTPATIENT
Start: 2022-08-11 | End: 2022-08-12

## 2022-08-11 RX ORDER — SODIUM CHLORIDE 0.9 % (FLUSH) 0.9 %
5-40 SYRINGE (ML) INJECTION PRN
Status: DISCONTINUED | OUTPATIENT
Start: 2022-08-11 | End: 2022-08-15

## 2022-08-11 RX ADMIN — ALLOPURINOL 100 MG: 100 TABLET ORAL at 20:23

## 2022-08-11 RX ADMIN — OXYCODONE 10 MG: 5 TABLET ORAL at 20:22

## 2022-08-11 RX ADMIN — SODIUM CHLORIDE 1000 ML: 9 INJECTION, SOLUTION INTRAVENOUS at 20:17

## 2022-08-11 RX ADMIN — MORPHINE SULFATE 4 MG: 4 INJECTION, SOLUTION INTRAMUSCULAR; INTRAVENOUS at 19:21

## 2022-08-11 RX ADMIN — FENTANYL CITRATE 50 MCG: 50 INJECTION INTRAMUSCULAR; INTRAVENOUS at 18:34

## 2022-08-11 RX ADMIN — HYDROMORPHONE HYDROCHLORIDE 1 MG: 1 INJECTION, SOLUTION INTRAMUSCULAR; INTRAVENOUS; SUBCUTANEOUS at 21:46

## 2022-08-11 RX ADMIN — HYDROXYCHLOROQUINE SULFATE 200 MG: 200 TABLET, FILM COATED ORAL at 20:23

## 2022-08-11 ASSESSMENT — PAIN SCALES - GENERAL
PAINLEVEL_OUTOF10: 10
PAINLEVEL_OUTOF10: 5
PAINLEVEL_OUTOF10: 8
PAINLEVEL_OUTOF10: 4
PAINLEVEL_OUTOF10: 7
PAINLEVEL_OUTOF10: 10

## 2022-08-11 ASSESSMENT — PAIN - FUNCTIONAL ASSESSMENT
PAIN_FUNCTIONAL_ASSESSMENT: PREVENTS OR INTERFERES WITH ALL ACTIVE AND SOME PASSIVE ACTIVITIES
PAIN_FUNCTIONAL_ASSESSMENT: 0-10
PAIN_FUNCTIONAL_ASSESSMENT: INTOLERABLE, UNABLE TO DO ANY ACTIVE OR PASSIVE ACTIVITIES

## 2022-08-11 ASSESSMENT — PAIN DESCRIPTION - ORIENTATION
ORIENTATION: RIGHT

## 2022-08-11 ASSESSMENT — PAIN DESCRIPTION - DESCRIPTORS
DESCRIPTORS: ACHING
DESCRIPTORS: ACHING;SHARP;THROBBING

## 2022-08-11 ASSESSMENT — LIFESTYLE VARIABLES
HOW OFTEN DO YOU HAVE A DRINK CONTAINING ALCOHOL: 4 OR MORE TIMES A WEEK
HOW MANY STANDARD DRINKS CONTAINING ALCOHOL DO YOU HAVE ON A TYPICAL DAY: 5 OR 6

## 2022-08-11 ASSESSMENT — PAIN DESCRIPTION - LOCATION
LOCATION: HIP
LOCATION: HIP
LOCATION: LEG
LOCATION: HIP
LOCATION: HIP

## 2022-08-11 ASSESSMENT — PAIN DESCRIPTION - FREQUENCY: FREQUENCY: CONTINUOUS

## 2022-08-11 NOTE — H&P
Department of Internal Medicine  History and Physical    PCP: Olman Talamantes DO  Admitting Physician: Dr. Trever Mendoza  Consultants:   Date of Service: 8/11/2022    CHIEF COMPLAINT:  fall and right hip pain    HISTORY OF PRESENT ILLNESS:    Patient is a 53-year-old male who presented to the ED following fall and right hip pain. Patient states that he was getting up from his toilet when his left leg gave out. Patient states that he was not able to bring himself up for the next 2 to 3 hours. He was having difficulty ambulating secondary to the right hip pain. PAST MEDICAL Hx:  Past Medical History:   Diagnosis Date    Diverticulitis     Gout     Hypertension     Tobacco abuse        PAST SURGICAL Hx:   Past Surgical History:   Procedure Laterality Date    APPENDECTOMY      CATARACT REMOVAL WITH IMPLANT Right 2 2 15    CATARACT REMOVAL WITH IMPLANT Left 4/6/15    COLONOSCOPY  7 years ago    Dr Lex Elkins      child       FAMILY Hx:  Family History   Problem Relation Age of Onset    COPD Mother     Diabetes Mother     Diabetes Father        HOME MEDICATIONS:  Prior to Admission medications    Medication Sig Start Date End Date Taking?  Authorizing Provider   allopurinol (ZYLOPRIM) 300 MG tablet TAKE ONE TABLET BY MOUTH DAILY 8/2/21   Historical Provider, MD   amoxicillin-clavulanate (AUGMENTIN) 875-125 MG per tablet TAKE ONE TABLET BY MOUTH EVERY 12 HOURS FOR 10 DAYS 8/2/21   Historical Provider, MD   pantoprazole (PROTONIX) 40 MG tablet Take 1 tablet by mouth every morning (before breakfast) for 7 days 8/11/21 9/3/21  Santhosh Mathews MD   lisinopril (PRINIVIL;ZESTRIL) 30 MG tablet Take 30 mg by mouth daily    Historical Provider, MD   aspirin 81 MG tablet Take 81 mg by mouth daily    Historical Provider, MD   hydroxychloroquine (PLAQUENIL) 200 MG tablet Take 200 mg by mouth 2 times daily    Historical Provider, MD   Cholecalciferol (VITAMIN D) 2000 UNITS CAPS capsule Take 1 capsule by mouth daily    Historical Provider, MD       ALLERGIES:  Patient has no known allergies. SOCIAL Hx:  Social History     Socioeconomic History    Marital status:      Spouse name: Not on file    Number of children: Not on file    Years of education: Not on file    Highest education level: Not on file   Occupational History    Not on file   Tobacco Use    Smoking status: Every Day     Packs/day: 1.50     Years: 40.00     Pack years: 60.00     Types: Cigarettes    Smokeless tobacco: Never   Substance and Sexual Activity    Alcohol use: Yes     Comment: beer daily    Drug use: No    Sexual activity: Not on file   Other Topics Concern    Not on file   Social History Narrative    Not on file     Social Determinants of Health     Financial Resource Strain: Not on file   Food Insecurity: Not on file   Transportation Needs: Not on file   Physical Activity: Not on file   Stress: Not on file   Social Connections: Not on file   Intimate Partner Violence: Not on file   Housing Stability: Not on file       ROS: Positive in bold  General:   Denies chills, fatigue, fever, malaise, night sweats or weight loss    Psychological:   Denies anxiety, disorientation or hallucinations    ENT:    Denies epistaxis, headaches, vertigo or visual changes    Cardiovascular:   Denies any chest pain, irregular heartbeats, or palpitations. No paroxysmal nocturnal dyspnea. Respiratory:   Denies shortness of breath, coughing, sputum production, hemoptysis, or wheezing. No orthopnea. Gastrointestinal:   Denies nausea, vomiting, diarrhea, or constipation. Denies any abdominal pain. Denies change in bowel habits or stools. Genito-Urinary:    Denies any urgency, frequency, hematuria. Voiding without difficulty. Musculoskeletal:   Denies joint pain, joint stiffness, joint swelling or muscle pain    Neurology:    Denies any headache or focal neurological deficits. No weakness or paresthesia.     Derm:    Denies any rashes, ulcers, or excoriations. Denies bruising. Extremities:   Denies any lower extremity swelling or edema. PHYSICAL EXAM: Abnormal findings noted  VITALS:  Vitals:    08/11/22 1742   BP: (!) 146/93   Pulse: (!) 103   Resp: 20   Temp: 97.8 °F (36.6 °C)   SpO2: 94%         CONSTITUTIONAL:    Awake, alert, cooperative, no apparent distress, and appears stated age    EYES:     EOMI, sclera clear, conjunctiva normal    ENT:    Normocephalic, atraumatic, External ears without lesions. NECK:    Supple, symmetrical, trachea midline, no JVD    HEMATOLOGIC/LYMPHATICS:    No cervical lymphadenopathy and no supraclavicular lymphadenopathy    LUNGS:    Symmetric. No increased work of breathing, good air exchange, clear to auscultation bilaterally, no wheezes, rhonchi, or rales,     CARDIOVASCULAR:    Normal apical impulse, regular rate and rhythm, normal S1 and S2, no S3 or S4, and no murmur noted  Patient has systolic murmur left upper sternal border    ABDOMEN:    soft, non-distended, non-tender    MUSCULOSKELETAL:    There is no redness, warmth, or swelling of the joints. NEUROLOGIC:    Awake, alert, oriented to name, place and time. SKIN:    No bruising or bleeding. No redness, warmth, or swelling    EXTREMITIES:    Peripheral pulses present. No edema, cyanosis, or swelling.     LINES/CATHETERS     LABORATORY DATA:  CBC with Differential:    Lab Results   Component Value Date/Time    WBC 16.2 08/11/2022 07:11 PM    RBC 3.16 08/11/2022 07:11 PM    HGB 10.3 08/11/2022 07:11 PM    HCT 31.2 08/11/2022 07:11 PM     08/11/2022 07:11 PM    MCV 98.7 08/11/2022 07:11 PM    MCH 32.6 08/11/2022 07:11 PM    MCHC 33.0 08/11/2022 07:11 PM    RDW 13.0 08/11/2022 07:11 PM    LYMPHOPCT 3.8 08/11/2022 07:11 PM    MONOPCT 4.7 08/11/2022 07:11 PM    BASOPCT 0.1 08/11/2022 07:11 PM    MONOSABS 0.76 08/11/2022 07:11 PM    LYMPHSABS 0.61 08/11/2022 07:11 PM    EOSABS 0.01 08/11/2022 07:11 PM    BASOSABS 0.02 08/11/2022 07:11 PM CMP:    Lab Results   Component Value Date/Time     08/11/2022 07:11 PM    K 4.6 08/11/2022 07:11 PM     08/11/2022 07:11 PM    CO2 20 08/11/2022 07:11 PM    BUN 35 08/11/2022 07:11 PM    CREATININE 1.4 08/11/2022 07:11 PM    GFRAA >60 08/11/2022 07:11 PM    LABGLOM 50 08/11/2022 07:11 PM    GLUCOSE 135 08/11/2022 07:11 PM    PROT 6.9 08/11/2022 07:11 PM    LABALBU 4.0 08/11/2022 07:11 PM    CALCIUM 9.1 08/11/2022 07:11 PM    BILITOT 0.3 08/11/2022 07:11 PM    ALKPHOS 88 08/11/2022 07:11 PM    AST 24 08/11/2022 07:11 PM    ALT 15 08/11/2022 07:11 PM       ASSESSMENT/PLAN:  Right hip fracture  Hypertension  History of gout  Current tobacco abuse    Patient presented to the ED following fall and right hip pain. Patient found to have a hip fracture. Orthopedic surgery consulted. Routine blood work ordered. Home medication resumed as appropriate.     Yamileth Cutler  7:03 PM  8/11/2022    Electronically signed by Ronit Dixon DO on 8/11/22 at 7:03 PM EDT

## 2022-08-11 NOTE — CONSULTS
Department of Orthopedic Trauma Surgery  Resident consult note      CHIEF COMPLAINT:   Chief Complaint   Patient presents with    Fall     2  hours ago    Leg Pain     Right            leg       HISTORY OF PRESENT ILLNESS:      Anna Cisneros is a 81 y.o. male with a history of rheumatoid arthritis presenting to the ED for right hip pain after a mechanical fall. He had a fall approximately 4 hours ago while trying to get off the toilet, and his left leg gave out and he collapsed. He denies hitting his head or having any LOC. He was unable to get up after the fall and was unable to crawl. He denies any preemptive symptoms such as headache, nausea, dizziness, lightheadedness. He complains of right hip pain that is worse with any motion. He does not use any assistive devices to ambulate despite having a \"weak leg. \"  He states he is a community ambulator but is not particularly active. His pain remains unchanged and is constant since it began. Denies headache, chest pain, shortness of breath. Denies any other orthopedic complaints at this time. Past Medical History:        Diagnosis Date    Diverticulitis     Gout     Hypertension     Tobacco abuse      Past Surgical History:        Procedure Laterality Date    APPENDECTOMY      CATARACT REMOVAL WITH IMPLANT Right 2 2 15    CATARACT REMOVAL WITH IMPLANT Left 4/6/15    COLONOSCOPY  7 years ago    Dr Aidee Tavera      child     Current Medications:   Current Facility-Administered Medications: fentaNYL (SUBLIMAZE) injection 50 mcg, 50 mcg, IntraVENous, Once  Allergies:  Patient has no known allergies. Social History:   TOBACCO:   reports that he has been smoking cigarettes. He has a 60.00 pack-year smoking history. He has never used smokeless tobacco.  ETOH:   reports current alcohol use. DRUGS:   reports no history of drug use.   ACTIVITIES OF DAILY LIVING:    OCCUPATION:    Family History:       Problem Relation Age of Onset    COPD Mother     Diabetes Mother     Diabetes Father        REVIEW OF SYSTEMS:   Skin: no abnormal pigmentation, rash  Eyes: no blurring or eye pain   Ears/Nose/Throat: no hearing loss, tinnitus  Respiratory: No increased work of breathing, no coughing  Cardiovascular: Brisk capillary refill bilaterally, well perfused extremities  Gastrointestinal: no nausea, vomiting  Neurologic: no paralysis, or seizures  MUSCULOSKELETAL:  positive for  pain and bone pain      PHYSICAL EXAM:    VITALS:  BP (!) 146/93   Pulse (!) 103   Temp 97.8 °F (36.6 °C) (Oral)   Resp 20   Ht 5' 8\" (1.727 m)   Wt 175 lb (79.4 kg)   SpO2 94%   BMI 26.61 kg/m²   Constitutional: Oriented to person, place, and time; Answer questions appropriately  HENT: Head: Normocephalic and atraumatic. Eyes: EOMI, LAVELLE  Neck: Supple, trachea midline  Cardiovascular: Brisk capillary refill to all extremities, extremities well perfused  Pulmonary/Chest: No increased work of breathing, no cough  Abdominal: Non-tender, non-distended  Neurologic:  Awake, alert and oriented in three planes. No gross deficits   MUSCULOSKELETAL:  Right lower Extremity:  Skin is intact circumferentially  +TTP about the right hip  He is unable to straight leg raise  compartments soft and compressible  Palpable dorsalis pedis and posterior tibialis pulse, brisk cap refill to toes, foot warm and perfused  Sensation intact to light touch in sural/deep peroneal/superficial peroneal/saphenous/posterior tibial nerve distributions to foot/ankle  Demonstrates active ankle plantar/dorsiflexion/great toe extension  Extremity is shortened and externally rotated while in the bed  Pain with logroll    Secondary Exam:   bilateralUE: No obvious signs of trauma. -TTP to fingers, hand, wrist, forearm, elbow, humerus, shoulder or clavicle. -- Patient able to flex/extend fingers, wrist, elbow and shoulder with active and passive ROM without pain, +2/4 Radial pulse, cap refill <3sec, +AIN/PIN/Radial/Ulnar/Median N, distal sensation grossly intact to C4-T1 dermatomes, compartments soft and compressible. leftLE: No obvious signs of trauma. -TTP to foot, ankle, leg, knee, thigh, hip.-- Patient able to flex/extend toes, ankle, knee and hip with active and passive ROM without pain,+2/4 DP & PT pulses, cap refill <3sec, +5/5 PF/DF/EHL, distal sensation grossly intact to L4-S1 dermatomes, compartments soft and compressible. Pelvis: -TTP, +Heel strike         DATA:    CBC:   Lab Results   Component Value Date/Time    WBC 11.8 06/01/2022 06:47 AM    RBC 3.62 06/01/2022 06:47 AM    HGB 11.9 06/01/2022 06:47 AM    HCT 36.2 06/01/2022 06:47 AM    .0 06/01/2022 06:47 AM    MCH 32.9 06/01/2022 06:47 AM    MCHC 32.9 06/01/2022 06:47 AM    RDW 13.2 06/01/2022 06:47 AM     06/01/2022 06:47 AM    MPV 9.7 06/01/2022 06:47 AM     PT/INR:  No results found for: PROTIME, INR    Radiology Review:  X-ray right hip and pelvis demonstrates a right femoral neck fracture that is displaced and shortened. No other fractures dislocations noted on imaging. Poor bone quality generally noted on imaging.     IMPRESSION:  Closed displaced right femoral neck fracture    PLAN:  Nonweightbearing right lower extremity  NPO at midnight  Pain management per primary team  Plan for hemiarthroplasty  Hold anticoagulation  Ice to affected area  PT OT when appropriate  Plan discussed with attending

## 2022-08-11 NOTE — ED NOTES
Report called to [phone], report given to [employer].  Transported to room with this RN     Smitha Hanson RN  08/11/22 1926

## 2022-08-11 NOTE — H&P
Department of Emergency Medicine   ED  Provider Note  Admit Date/RoomTime: 8/11/2022  5:41 PM  ED Room: 01/01          History of Present Illness:  8/11/22, Time: 5:52 PM EDT  Chief Complaint   Patient presents with    Fall     2  hours ago    Leg Pain     Right            leg                Walter Mclean is a 81 y.o. male presenting to the ED for right hip pain after a fall. He had a fall approximately 4 hours ago while trying to get off the toilet, and his left leg gave out and he collapsed. He denies hitting his head or having any LOC. He was unable to get up after the fall and was unable to crawl. He denies any preemptive symptoms such as headache, nausea, dizziness, lightheadedness. He complains of right hip pain that is worse with any motion. He does not use any assistive devices to ambulate despite having a \"weak leg. \"  His pain remains unchanged and is constant since it began. Denies headache, chest pain, shortness of breath. Denies any other symptoms at this time. Review of Systems:   A complete review of systems was performed and pertinent positives and negatives are stated within HPI, all other systems reviewed and are negative.        --------------------------------------------- PAST HISTORY ---------------------------------------------  Past Medical History:  has a past medical history of Diverticulitis, Gout, Hypertension, and Tobacco abuse. Past Surgical History:  has a past surgical history that includes Colonoscopy (7 years ago); Appendectomy; Nasal fracture surgery; Cataract removal with implant (Right, 2 2 15); and Cataract removal with implant (Left, 4/6/15). Social History:  reports that he has been smoking cigarettes. He has a 60.00 pack-year smoking history. He has never used smokeless tobacco. He reports current alcohol use. He reports that he does not use drugs. Family History: family history includes COPD in his mother; Diabetes in his father and mother. . Unless otherwise noted, family history is non contributory    The patients home medications have been reviewed. Allergies: Patient has no known allergies. I have reviewed the past medical history, past surgical history, social history, and family history    ---------------------------------------------------PHYSICAL EXAM--------------------------------------    Constitutional/General: Alert and oriented x3  Head: Normocephalic and atraumatic  Eyes: PERRL, EOMI, sclera non icteric  ENT: Oropharynx clear, handling secretions, no trismus, no asymmetry of the posterior oropharynx or uvular edema  Neck: Supple, full ROM, no stridor, no meningeal signs  Respiratory: Lungs clear to auscultation bilaterally, no wheezes, rales, or rhonchi. Not in respiratory distress  Cardiovascular:  Regular rate. Regular rhythm. No murmurs, no gallops, no rubs. 2+ distal pulses. Equal extremity pulses. Chest: No chest wall tenderness  Gastrointestinal:  Abdomen Soft, Non tender, Non distended. No rebound, guarding, or rigidity. No pulsatile masses. Musculoskeletal: Moves all extremities x 4. Warm and well perfused, no clubbing, no cyanosis, no edema. Capillary refill <3 seconds  right Lower Extremity  Skin is intact circumferentially  +TTP about the right hip  Extremity is shortened and externally rotated while lying in the bed  compartments soft and compressible  Palpable dorsalis pedis and posterior tibialis pulse, brisk cap refill to toes, foot warm and perfused  Sensation intact to light touch in sural/deep peroneal/superficial peroneal/saphenous/posterior tibial nerve distributions to foot/ankle  Demonstrates active ankle plantar/dorsiflexion/great toe extension    Skin: skin warm and dry. No rashes.    Neurologic: GCS 15, no focal deficits, symmetric strength 5/5 in the upper and lower extremities bilaterally  Psychiatric: Normal Affect          -------------------------------------------------- RESULTS or ambulate after the fall. He has immediate pain that is constant and unchanged since it began. Right lower extremity is shortened and externally rotated in the bed. Hip and pelvis x-rays ordered with concern for hip fracture. Re-Evaluations:       Re-examination  8/11/22   5:52 PM EDT          Vital Signs:   Vitals:    08/11/22 1742   BP: (!) 146/93   Pulse: (!) 103   Resp: 20   Temp: 97.8 °F (36.6 °C)   TempSrc: Oral   SpO2: 94%   Weight: 175 lb (79.4 kg)   Height: 5' 8\" (1.727 m)     Card/Pulm:  Rhythm: normal rate. Heart Sounds: no murmurs, gallops, or rubs. clear to auscultation, no wheezes or rales, and unlabored breathing. Capillary Refill: normal.  Radial Pulse:  present 2+. Skin:  Dry and Warm. This patient's ED course included: a personal history and physicial examination    This patient has remained hemodynamically stable during their ED course. Consultations:  Orthopedic surgery            Counseling: The emergency provider has spoken with the patient and discussed todays results, in addition to providing specific details for the plan of care and counseling regarding the diagnosis and prognosis. Questions are answered at this time and they are agreeable with the plan.       --------------------------------- IMPRESSION AND DISPOSITION ---------------------------------    IMPRESSION  1. Cl[facility] right [facility]        DISPOSITION  Disposition: Admit to med/surg floor  Patient condition is stable        NOTE: This report was transcribed using voice recognition software.  Every effort was made to ensure accuracy; however, inadvertent computerized transcription errors may be present

## 2022-08-12 ENCOUNTER — APPOINTMENT (OUTPATIENT)
Dept: GENERAL RADIOLOGY | Age: 72
DRG: 521 | End: 2022-08-12
Payer: MEDICARE

## 2022-08-12 ENCOUNTER — ANESTHESIA (OUTPATIENT)
Dept: OPERATING ROOM | Age: 72
DRG: 521 | End: 2022-08-12
Payer: MEDICARE

## 2022-08-12 ENCOUNTER — ANESTHESIA EVENT (OUTPATIENT)
Dept: OPERATING ROOM | Age: 72
DRG: 521 | End: 2022-08-12
Payer: MEDICARE

## 2022-08-12 PROBLEM — S72.009A HIP FRACTURE (HCC): Status: ACTIVE | Noted: 2022-08-12

## 2022-08-12 PROBLEM — S72.001A CLOSED RIGHT HIP FRACTURE, INITIAL ENCOUNTER (HCC): Status: ACTIVE | Noted: 2022-08-12

## 2022-08-12 LAB
ALBUMIN SERPL-MCNC: 3.6 G/DL (ref 3.5–5.2)
ALP BLD-CCNC: 83 U/L (ref 40–129)
ALT SERPL-CCNC: 12 U/L (ref 0–40)
ANION GAP SERPL CALCULATED.3IONS-SCNC: 10 MMOL/L (ref 7–16)
AST SERPL-CCNC: 22 U/L (ref 0–39)
BACTERIA: NORMAL /HPF
BASOPHILS ABSOLUTE: 0.02 E9/L (ref 0–0.2)
BASOPHILS RELATIVE PERCENT: 0.2 % (ref 0–2)
BILIRUB SERPL-MCNC: 0.6 MG/DL (ref 0–1.2)
BILIRUBIN URINE: NEGATIVE
BLOOD, URINE: NEGATIVE
BUN BLDV-MCNC: 29 MG/DL (ref 6–23)
CALCIUM SERPL-MCNC: 9.2 MG/DL (ref 8.6–10.2)
CHLORIDE BLD-SCNC: 101 MMOL/L (ref 98–107)
CLARITY: CLEAR
CO2: 23 MMOL/L (ref 22–29)
COLOR: YELLOW
CREAT SERPL-MCNC: 1.2 MG/DL (ref 0.7–1.2)
EKG ATRIAL RATE: 104 BPM
EKG P AXIS: 27 DEGREES
EKG P-R INTERVAL: 176 MS
EKG Q-T INTERVAL: 344 MS
EKG QRS DURATION: 80 MS
EKG QTC CALCULATION (BAZETT): 452 MS
EKG R AXIS: 53 DEGREES
EKG T AXIS: 44 DEGREES
EKG VENTRICULAR RATE: 104 BPM
EOSINOPHILS ABSOLUTE: 0.03 E9/L (ref 0.05–0.5)
EOSINOPHILS RELATIVE PERCENT: 0.3 % (ref 0–6)
GFR AFRICAN AMERICAN: >60
GFR NON-AFRICAN AMERICAN: 60 ML/MIN/1.73
GLUCOSE BLD-MCNC: 108 MG/DL (ref 74–99)
GLUCOSE URINE: NEGATIVE MG/DL
HCT VFR BLD CALC: 29.4 % (ref 37–54)
HEMOGLOBIN: 9.8 G/DL (ref 12.5–16.5)
IMMATURE GRANULOCYTES #: 0.04 E9/L
IMMATURE GRANULOCYTES %: 0.4 % (ref 0–5)
KETONES, URINE: NEGATIVE MG/DL
LEUKOCYTE ESTERASE, URINE: NEGATIVE
LV EF: 71 %
LVEF MODALITY: NORMAL
LYMPHOCYTES ABSOLUTE: 0.82 E9/L (ref 1.5–4)
LYMPHOCYTES RELATIVE PERCENT: 7.5 % (ref 20–42)
MAGNESIUM: 1.5 MG/DL (ref 1.6–2.6)
MCH RBC QN AUTO: 33.4 PG (ref 26–35)
MCHC RBC AUTO-ENTMCNC: 33.3 % (ref 32–34.5)
MCV RBC AUTO: 100.3 FL (ref 80–99.9)
MONOCYTES ABSOLUTE: 0.65 E9/L (ref 0.1–0.95)
MONOCYTES RELATIVE PERCENT: 5.9 % (ref 2–12)
NEUTROPHILS ABSOLUTE: 9.38 E9/L (ref 1.8–7.3)
NEUTROPHILS RELATIVE PERCENT: 85.7 % (ref 43–80)
NITRITE, URINE: NEGATIVE
PDW BLD-RTO: 13.2 FL (ref 11.5–15)
PH UA: 5.5 (ref 5–9)
PHOSPHORUS: 3.7 MG/DL (ref 2.5–4.5)
PLATELET # BLD: 274 E9/L (ref 130–450)
PMV BLD AUTO: 9.6 FL (ref 7–12)
POTASSIUM SERPL-SCNC: 4.6 MMOL/L (ref 3.5–5)
PROCALCITONIN: 0.13 NG/ML (ref 0–0.08)
PROTEIN UA: NEGATIVE MG/DL
RBC # BLD: 2.93 E12/L (ref 3.8–5.8)
RBC UA: NORMAL /HPF (ref 0–2)
SODIUM BLD-SCNC: 134 MMOL/L (ref 132–146)
SPECIFIC GRAVITY UA: 1.02 (ref 1–1.03)
T4 FREE: 1.16 NG/DL (ref 0.93–1.7)
TOTAL PROTEIN: 6.3 G/DL (ref 6.4–8.3)
TROPONIN, HIGH SENSITIVITY: 31 NG/L (ref 0–11)
TROPONIN, HIGH SENSITIVITY: 35 NG/L (ref 0–11)
TSH SERPL DL<=0.05 MIU/L-ACNC: 2 UIU/ML (ref 0.27–4.2)
UROBILINOGEN, URINE: 0.2 E.U./DL
WBC # BLD: 10.9 E9/L (ref 4.5–11.5)
WBC UA: NORMAL /HPF (ref 0–5)

## 2022-08-12 PROCEDURE — 6370000000 HC RX 637 (ALT 250 FOR IP): Performed by: INTERNAL MEDICINE

## 2022-08-12 PROCEDURE — 1200000000 HC SEMI PRIVATE

## 2022-08-12 PROCEDURE — 6360000002 HC RX W HCPCS: Performed by: INTERNAL MEDICINE

## 2022-08-12 PROCEDURE — 94664 DEMO&/EVAL PT USE INHALER: CPT

## 2022-08-12 PROCEDURE — 84443 ASSAY THYROID STIM HORMONE: CPT

## 2022-08-12 PROCEDURE — 93306 TTE W/DOPPLER COMPLETE: CPT

## 2022-08-12 PROCEDURE — 84484 ASSAY OF TROPONIN QUANT: CPT

## 2022-08-12 PROCEDURE — 94640 AIRWAY INHALATION TREATMENT: CPT

## 2022-08-12 PROCEDURE — 2580000003 HC RX 258

## 2022-08-12 PROCEDURE — 36415 COLL VENOUS BLD VENIPUNCTURE: CPT

## 2022-08-12 PROCEDURE — 6360000002 HC RX W HCPCS

## 2022-08-12 PROCEDURE — 83735 ASSAY OF MAGNESIUM: CPT

## 2022-08-12 PROCEDURE — 6360000002 HC RX W HCPCS: Performed by: FAMILY MEDICINE

## 2022-08-12 PROCEDURE — 85025 COMPLETE CBC W/AUTO DIFF WBC: CPT

## 2022-08-12 PROCEDURE — 80053 COMPREHEN METABOLIC PANEL: CPT

## 2022-08-12 PROCEDURE — 81001 URINALYSIS AUTO W/SCOPE: CPT

## 2022-08-12 PROCEDURE — 2580000003 HC RX 258: Performed by: INTERNAL MEDICINE

## 2022-08-12 PROCEDURE — 73552 X-RAY EXAM OF FEMUR 2/>: CPT

## 2022-08-12 PROCEDURE — 6360000002 HC RX W HCPCS: Performed by: NURSE PRACTITIONER

## 2022-08-12 PROCEDURE — 99231 SBSQ HOSP IP/OBS SF/LOW 25: CPT | Performed by: ORTHOPAEDIC SURGERY

## 2022-08-12 PROCEDURE — 84100 ASSAY OF PHOSPHORUS: CPT

## 2022-08-12 PROCEDURE — 84439 ASSAY OF FREE THYROXINE: CPT

## 2022-08-12 PROCEDURE — 84145 PROCALCITONIN (PCT): CPT

## 2022-08-12 RX ORDER — ALLOPURINOL 100 MG/1
100 TABLET ORAL DAILY
Status: DISCONTINUED | OUTPATIENT
Start: 2022-08-12 | End: 2022-08-17 | Stop reason: HOSPADM

## 2022-08-12 RX ORDER — MAGNESIUM SULFATE 1 G/100ML
1000 INJECTION INTRAVENOUS PRN
Status: DISCONTINUED | OUTPATIENT
Start: 2022-08-12 | End: 2022-08-17 | Stop reason: HOSPADM

## 2022-08-12 RX ORDER — ASPIRIN 81 MG/1
81 TABLET, CHEWABLE ORAL DAILY
Status: DISCONTINUED | OUTPATIENT
Start: 2022-08-12 | End: 2022-08-17 | Stop reason: HOSPADM

## 2022-08-12 RX ORDER — HYDROMORPHONE HYDROCHLORIDE 1 MG/ML
0.5 INJECTION, SOLUTION INTRAMUSCULAR; INTRAVENOUS; SUBCUTANEOUS EVERY 4 HOURS PRN
Status: COMPLETED | OUTPATIENT
Start: 2022-08-12 | End: 2022-08-13

## 2022-08-12 RX ORDER — BUDESONIDE 0.5 MG/2ML
0.5 INHALANT ORAL 2 TIMES DAILY
Status: DISCONTINUED | OUTPATIENT
Start: 2022-08-12 | End: 2022-08-17 | Stop reason: HOSPADM

## 2022-08-12 RX ORDER — IPRATROPIUM BROMIDE AND ALBUTEROL SULFATE 2.5; .5 MG/3ML; MG/3ML
1 SOLUTION RESPIRATORY (INHALATION)
Status: DISCONTINUED | OUTPATIENT
Start: 2022-08-12 | End: 2022-08-17 | Stop reason: HOSPADM

## 2022-08-12 RX ORDER — HYDROMORPHONE HYDROCHLORIDE 1 MG/ML
0.5 INJECTION, SOLUTION INTRAMUSCULAR; INTRAVENOUS; SUBCUTANEOUS EVERY 4 HOURS PRN
Status: COMPLETED | OUTPATIENT
Start: 2022-08-12 | End: 2022-08-12

## 2022-08-12 RX ORDER — HYDROMORPHONE HYDROCHLORIDE 1 MG/ML
1 INJECTION, SOLUTION INTRAMUSCULAR; INTRAVENOUS; SUBCUTANEOUS
Status: COMPLETED | OUTPATIENT
Start: 2022-08-12 | End: 2022-08-12

## 2022-08-12 RX ORDER — HYDROMORPHONE HYDROCHLORIDE 1 MG/ML
1 INJECTION, SOLUTION INTRAMUSCULAR; INTRAVENOUS; SUBCUTANEOUS
Status: COMPLETED | OUTPATIENT
Start: 2022-08-12 | End: 2022-08-13

## 2022-08-12 RX ORDER — ALBUTEROL SULFATE 2.5 MG/3ML
2.5 SOLUTION RESPIRATORY (INHALATION) EVERY 4 HOURS PRN
Status: DISCONTINUED | OUTPATIENT
Start: 2022-08-12 | End: 2022-08-17 | Stop reason: HOSPADM

## 2022-08-12 RX ORDER — ARFORMOTEROL TARTRATE 15 UG/2ML
15 SOLUTION RESPIRATORY (INHALATION) 2 TIMES DAILY
Status: DISCONTINUED | OUTPATIENT
Start: 2022-08-12 | End: 2022-08-17 | Stop reason: HOSPADM

## 2022-08-12 RX ORDER — MAGNESIUM SULFATE IN WATER 40 MG/ML
2000 INJECTION, SOLUTION INTRAVENOUS ONCE
Status: COMPLETED | OUTPATIENT
Start: 2022-08-12 | End: 2022-08-12

## 2022-08-12 RX ADMIN — MAGNESIUM SULFATE HEPTAHYDRATE 2000 MG: 40 INJECTION, SOLUTION INTRAVENOUS at 18:53

## 2022-08-12 RX ADMIN — SODIUM CHLORIDE, PRESERVATIVE FREE 10 ML: 5 INJECTION INTRAVENOUS at 08:34

## 2022-08-12 RX ADMIN — HYDROXYCHLOROQUINE SULFATE 200 MG: 200 TABLET, FILM COATED ORAL at 08:33

## 2022-08-12 RX ADMIN — HYDROMORPHONE HYDROCHLORIDE 1 MG: 1 INJECTION, SOLUTION INTRAMUSCULAR; INTRAVENOUS; SUBCUTANEOUS at 10:23

## 2022-08-12 RX ADMIN — ALLOPURINOL 100 MG: 100 TABLET ORAL at 08:33

## 2022-08-12 RX ADMIN — HYDROMORPHONE HYDROCHLORIDE 1 MG: 1 INJECTION, SOLUTION INTRAMUSCULAR; INTRAVENOUS; SUBCUTANEOUS at 14:28

## 2022-08-12 RX ADMIN — SODIUM CHLORIDE, PRESERVATIVE FREE 10 ML: 5 INJECTION INTRAVENOUS at 01:38

## 2022-08-12 RX ADMIN — OXYCODONE 10 MG: 5 TABLET ORAL at 21:38

## 2022-08-12 RX ADMIN — SODIUM CHLORIDE, PRESERVATIVE FREE 10 ML: 5 INJECTION INTRAVENOUS at 18:07

## 2022-08-12 RX ADMIN — SODIUM CHLORIDE, PRESERVATIVE FREE 10 ML: 5 INJECTION INTRAVENOUS at 06:07

## 2022-08-12 RX ADMIN — IPRATROPIUM BROMIDE AND ALBUTEROL SULFATE 1 AMPULE: .5; 2.5 SOLUTION RESPIRATORY (INHALATION) at 18:10

## 2022-08-12 RX ADMIN — HYDROMORPHONE HYDROCHLORIDE 1 MG: 1 INJECTION, SOLUTION INTRAMUSCULAR; INTRAVENOUS; SUBCUTANEOUS at 18:06

## 2022-08-12 RX ADMIN — SODIUM CHLORIDE, PRESERVATIVE FREE 10 ML: 5 INJECTION INTRAVENOUS at 21:39

## 2022-08-12 RX ADMIN — IPRATROPIUM BROMIDE AND ALBUTEROL SULFATE 1 AMPULE: .5; 2.5 SOLUTION RESPIRATORY (INHALATION) at 10:35

## 2022-08-12 RX ADMIN — SODIUM CHLORIDE, PRESERVATIVE FREE 10 ML: 5 INJECTION INTRAVENOUS at 23:49

## 2022-08-12 RX ADMIN — WATER 2000 MG: 1 INJECTION INTRAMUSCULAR; INTRAVENOUS; SUBCUTANEOUS at 06:06

## 2022-08-12 RX ADMIN — BUDESONIDE 500 MCG: 0.5 SUSPENSION RESPIRATORY (INHALATION) at 06:52

## 2022-08-12 RX ADMIN — IPRATROPIUM BROMIDE AND ALBUTEROL SULFATE 1 AMPULE: .5; 2.5 SOLUTION RESPIRATORY (INHALATION) at 14:35

## 2022-08-12 RX ADMIN — HYDROMORPHONE HYDROCHLORIDE 1 MG: 1 INJECTION, SOLUTION INTRAMUSCULAR; INTRAVENOUS; SUBCUTANEOUS at 23:48

## 2022-08-12 RX ADMIN — SODIUM CHLORIDE, PRESERVATIVE FREE 10 ML: 5 INJECTION INTRAVENOUS at 04:28

## 2022-08-12 RX ADMIN — BUDESONIDE 500 MCG: 0.5 SUSPENSION RESPIRATORY (INHALATION) at 18:10

## 2022-08-12 RX ADMIN — HYDROMORPHONE HYDROCHLORIDE 0.5 MG: 1 INJECTION, SOLUTION INTRAMUSCULAR; INTRAVENOUS; SUBCUTANEOUS at 01:34

## 2022-08-12 RX ADMIN — OXYCODONE 10 MG: 5 TABLET ORAL at 08:33

## 2022-08-12 RX ADMIN — ARFORMOTEROL TARTRATE 15 MCG: 15 SOLUTION RESPIRATORY (INHALATION) at 18:10

## 2022-08-12 RX ADMIN — OXYCODONE 10 MG: 5 TABLET ORAL at 16:55

## 2022-08-12 RX ADMIN — ARFORMOTEROL TARTRATE 15 MCG: 15 SOLUTION RESPIRATORY (INHALATION) at 06:52

## 2022-08-12 RX ADMIN — LISINOPRIL 30 MG: 20 TABLET ORAL at 08:33

## 2022-08-12 RX ADMIN — HYDROMORPHONE HYDROCHLORIDE 1 MG: 1 INJECTION, SOLUTION INTRAMUSCULAR; INTRAVENOUS; SUBCUTANEOUS at 04:26

## 2022-08-12 ASSESSMENT — PAIN DESCRIPTION - PAIN TYPE
TYPE: ACUTE PAIN

## 2022-08-12 ASSESSMENT — PAIN DESCRIPTION - DESCRIPTORS
DESCRIPTORS: ACHING
DESCRIPTORS: ACHING;SORE;TENDER
DESCRIPTORS: ACHING;SORE;TENDER
DESCRIPTORS: ACHING
DESCRIPTORS: ACHING;SORE;TENDER
DESCRIPTORS: ACHING
DESCRIPTORS: ACHING;DISCOMFORT;SORE
DESCRIPTORS: ACHING;DISCOMFORT;SORE
DESCRIPTORS: ACHING

## 2022-08-12 ASSESSMENT — PAIN DESCRIPTION - ONSET
ONSET: ON-GOING

## 2022-08-12 ASSESSMENT — PAIN DESCRIPTION - LOCATION
LOCATION: HIP

## 2022-08-12 ASSESSMENT — PAIN - FUNCTIONAL ASSESSMENT
PAIN_FUNCTIONAL_ASSESSMENT: PREVENTS OR INTERFERES SOME ACTIVE ACTIVITIES AND ADLS
PAIN_FUNCTIONAL_ASSESSMENT: PREVENTS OR INTERFERES WITH MANY ACTIVE NOT PASSIVE ACTIVITIES
PAIN_FUNCTIONAL_ASSESSMENT: PREVENTS OR INTERFERES WITH ALL ACTIVE AND SOME PASSIVE ACTIVITIES
PAIN_FUNCTIONAL_ASSESSMENT: ACTIVITIES ARE NOT PREVENTED
PAIN_FUNCTIONAL_ASSESSMENT: PREVENTS OR INTERFERES SOME ACTIVE ACTIVITIES AND ADLS
PAIN_FUNCTIONAL_ASSESSMENT: PREVENTS OR INTERFERES SOME ACTIVE ACTIVITIES AND ADLS
PAIN_FUNCTIONAL_ASSESSMENT: PREVENTS OR INTERFERES WITH MANY ACTIVE NOT PASSIVE ACTIVITIES

## 2022-08-12 ASSESSMENT — PAIN SCALES - GENERAL
PAINLEVEL_OUTOF10: 2
PAINLEVEL_OUTOF10: 5
PAINLEVEL_OUTOF10: 10
PAINLEVEL_OUTOF10: 8
PAINLEVEL_OUTOF10: 8
PAINLEVEL_OUTOF10: 10
PAINLEVEL_OUTOF10: 7
PAINLEVEL_OUTOF10: 7
PAINLEVEL_OUTOF10: 9
PAINLEVEL_OUTOF10: 10
PAINLEVEL_OUTOF10: 9

## 2022-08-12 ASSESSMENT — PAIN DESCRIPTION - FREQUENCY
FREQUENCY: CONTINUOUS

## 2022-08-12 ASSESSMENT — PAIN DESCRIPTION - ORIENTATION
ORIENTATION: RIGHT

## 2022-08-12 ASSESSMENT — LIFESTYLE VARIABLES: SMOKING_STATUS: 1

## 2022-08-12 ASSESSMENT — COPD QUESTIONNAIRES: CAT_SEVERITY: NO INTERVAL CHANGE

## 2022-08-12 NOTE — PROGRESS NOTES
Department of Orthopedic Surgery  Resident Progress Note    Patient seen and examined. Still having pain in the right hip, had a fall yesterday which resulted in a basicervical femoral neck fracture. Denies any other complaints, denies shortness of breath, chest pain, urinary symptoms. No history of malignancy or recent illness    VITALS:  BP (!) 147/66   Pulse 93   Temp 98.1 °F (36.7 °C) (Oral)   Resp 18   Ht 5' 8\" (1.727 m)   Wt 175 lb (79.4 kg)   SpO2 90%   BMI 26.61 kg/m²     General: alert and oriented to person, place and time, well-developed and well-nourished, in no acute distress    MUSCULOSKELETAL:   left lower extremity:  Skin intact  Compartments soft and compressible  Leg is shortened externally rotated  +PF/DF/EHL  +2/4 DP & PT pulses, Brisk Cap refill, Toes warm and perfused  Distal sensation grossly intact to Peroneals, Sural, Saphenous, and tibial nrs    CBC:   Lab Results   Component Value Date/Time    WBC 10.9 08/12/2022 04:51 AM    HGB 9.8 08/12/2022 04:51 AM    HCT 29.4 08/12/2022 04:51 AM     08/12/2022 04:51 AM     PT/INR:    Lab Results   Component Value Date/Time    PROTIME 10.9 08/11/2022 07:11 PM    INR 1.0 08/11/2022 07:11 PM         ASSESSMENT  Closed right basicervical femoral neck fracture    PLAN      Continue physical therapy and protocol: NWB -right LE  Plan 4 OR either today 8/12 for tomorrow  Deep venous thrombosis prophylaxis -hold for surgery, early mobilization  PT/OT when appropriate  Pain Control: IV and PO  OR planning  I was present with the resident during the history and exam. I discussed the case with the resident and agree with the findings and plan as documented in the resident's note.

## 2022-08-12 NOTE — ANESTHESIA PRE PROCEDURE
Department of Anesthesiology  Preprocedure Note       Name:  Phil Lucero   Age:  81 y.o.  :  1950                                          MRN:  [id number]         Date:  2022      Surgeon: Wilmer Potts): Mirlande Moore DO    Procedure: Procedure(s):  RIGHT HIP  ARTHROPLASTY (DEPUY) VERSES HIP TOTAL  HEMIARTHROPLASTY (REQUEST AFTERNOON)  RIGHT HIP HEMIARTHROPLASTY    Medications prior to admission:   Prior to Admission medications    Medication Sig Start Date End Date Taking? Authorizing Provider   allopurinol (ZYLOPRIM) 100 MG tablet Take 100 mg by mouth in the morning. Yes Historical Provider, MD   amoxicillin-clavulanate (AUGMENTIN) 875-125 MG per tablet TAKE ONE TABLET BY MOUTH EVERY 12 HOURS FOR 10 DAYS  Patient not taking: Reported on 2022   Historical Provider, MD   pantoprazole (PROTONIX) 40 MG tablet Take 1 tablet by mouth every morning (before breakfast) for 7 days 8/11/21 9/3/21  Darryl Cline MD   lisinopril (PRINIVIL;ZESTRIL) 30 MG tablet Take 30 mg by mouth daily    Historical Provider, MD   aspirin 81 MG tablet Take 81 mg by mouth daily    Historical Provider, MD   hydroxychloroquine (PLAQUENIL) 200 MG tablet Take 200 mg by mouth in the morning.     Historical Provider, MD   Cholecalciferol (VITAMIN D) 2000 UNITS CAPS capsule Take 1 capsule by mouth daily  Patient not taking: Reported on 2022    Historical Provider, MD       Current medications:    Current Facility-Administered Medications   Medication Dose Route Frequency Provider Last Rate Last Admin    [Held by provider] aspirin chewable tablet 81 mg  81 mg Oral Daily Velna Peabody, DO        allopurinol (ZYLOPRIM) tablet 100 mg  100 mg Oral Daily Ismail U Violetta, DO   100 mg at 22 0833    lisinopril (PRINIVIL;ZESTRIL) tablet 30 mg  30 mg Oral Daily Ismail U Violetta, DO   30 mg at 22 0833    albuterol (PROVENTIL) nebulizer solution 2.5 mg  2.5 mg Nebulization Q4H PRN Trish Sesay, APRN - CNP  budesonide (PULMICORT) nebulizer suspension 500 mcg  0.5 mg Nebulization BID Colleen Osman APRN - CNP   500 mcg at 08/12/22 1306    Arformoterol Tartrate (BROVANA) nebulizer solution 15 mcg  15 mcg Nebulization BID Colleen Osman APRN - CNP   15 mcg at 08/12/22 6760    ipratropium-albuterol (DUONEB) nebulizer solution 1 ampule  1 ampule Inhalation Q4H BEATRIZ Georges, DO        magnesium sulfate 1000 mg in dextrose 5% 100 mL IVPB  1,000 mg IntraVENous PRN Alexandria Georges, DO        HYDROmorphone HCl PF (DILAUDID) injection 0.5 mg  0.5 mg IntraVENous Q4H PRN Nimo lAvarez DO        Or    HYDROmorphone HCl PF (DILAUDID) injection 1 mg  1 mg IntraVENous Q3H PRN Nimo Alvarez, DO        oxyCODONE (ROXICODONE) immediate release tablet 5 mg  5 mg Oral Q4H PRN Kinza Gastelum, DO        Or    oxyCODONE (ROXICODONE) immediate release tablet 10 mg  10 mg Oral Q4H PRN Kinza Gastelum, DO   10 mg at 08/12/22 4773    glucose chewable tablet 16 g  4 tablet Oral PRN Kinza Gastelum, DO        dextrose bolus 10% 125 mL  125 mL IntraVENous PRN Kinza Gastelum, DO        Or    dextrose bolus 10% 250 mL  250 mL IntraVENous PRN Kinza Gastelum, DO        glucagon (rDNA) injection 1 mg  1 mg SubCUTAneous PRN Kinza Gastelum, DO        dextrose 10 % infusion   IntraVENous Continuous PRN Kinza , DO        sodium chloride flush 0.9 % injection 5-40 mL  5-40 mL IntraVENous 2 times per day Kinza , DO   10 mL at 08/12/22 0834    sodium chloride flush 0.9 % injection 5-40 mL  5-40 mL IntraVENous PRN Kinza , DO   10 mL at 08/12/22 6466    0.9 % sodium chloride infusion   IntraVENous PRN Kinza Gastelum, DO        polyethylene glycol (GLYCOLAX) packet 17 g  17 g Oral Daily PRN Kinza Gastelum, DO        acetaminophen (TYLENOL) tablet 650 mg  650 mg Oral Q6H PRN Kinza Gastelum, DO        Or    acetaminophen (TYLENOL) suppository 650 mg  650 mg Rectal Q6H PRN Kinza Gastelum, DO        hydroxychloroquine (PLAQUENIL) tablet 200 mg  200 mg Oral Daily Dave Laguna DO   200 mg at 08/12/22 6233    pantoprazole (PROTONIX) tablet 40 mg  40 mg Oral QAM AC Ismail U DO Violetta           Allergies:  No Known Allergies    Problem List:    Patient Active Problem List   Diagnosis Code    [facility] S72.001A       Past Medical History:        Diagnosis Date    Diverticulitis     Gout     Hypertension     Tobacco abuse        Past Surgical History:        Procedure Laterality Date    APPENDECTOMY      CATARACT REMOVAL WITH IMPLANT Right 2 2 15    CATARACT REMOVAL WITH IMPLANT Left 4/6/15    COLONOSCOPY  7 years ago    Dr Davalos Loop      child       Social History:    Social History     Tobacco Use    Smoking status: Every Day     Packs/day: 1.50     Years: 40.00     Pack years: 60.00     Types: Cigarettes    Smokeless tobacco: Never   Substance Use Topics    Alcohol use: Yes     Comment: beer daily                                Ready to quit: Not Answered  Counseling given: Not Answered      Vital Signs (Current):   Vitals:    08/12/22 0456 08/12/22 0530 08/12/22 0652 08/12/22 0830   BP:  (!) 147/66  (!) 167/69   Pulse:  93  97   Resp: 18 18  18   Temp:  36.7 °C (98.1 °F)  37.6 °C (99.6 °F)   TempSrc:  Oral  Oral   SpO2:  90% 92% 91%   Weight:       Height:                                                  BP Readings from Last 3 Encounters:   08/12/22 (!) 167/69   09/03/21 (!) 167/69   08/11/21 134/60       NPO Status:                                                                                 BMI:   Wt Readings from Last 3 Encounters:   08/11/22 175 lb (79.4 kg)   07/29/22 175 lb (79.4 kg)   07/20/22 175 lb (79.4 kg)     Body mass index is 26.61 kg/m².     CBC:   Lab Results   Component Value Date/Time    WBC 10.9 08/12/2022 04:51 AM    RBC 2.93 08/12/2022 04:51 AM    HGB 9.8 08/12/2022 04:51 AM    HCT 29.4 08/12/2022 04:51 AM .3 08/12/2022 04:51 AM    RDW 13.2 08/12/2022 04:51 AM     08/12/2022 04:51 AM       CMP:   Lab Results   Component Value Date/Time     08/12/2022 04:51 AM    K 4.6 08/12/2022 04:51 AM    K 4.6 08/11/2022 07:11 PM     08/12/2022 04:51 AM    CO2 23 08/12/2022 04:51 AM    BUN 29 08/12/2022 04:51 AM    CREATININE 1.2 08/12/2022 04:51 AM    GFRAA >60 08/12/2022 04:51 AM    LABGLOM 60 08/12/2022 04:51 AM    GLUCOSE 108 08/12/2022 04:51 AM    PROT 6.3 08/12/2022 04:51 AM    CALCIUM 9.2 08/12/2022 04:51 AM    BILITOT 0.6 08/12/2022 04:51 AM    ALKPHOS 83 08/12/2022 04:51 AM    AST 22 08/12/2022 04:51 AM    ALT 12 08/12/2022 04:51 AM       POC Tests: No results for input(s): POCGLU, POCNA, POCK, POCCL, POCBUN, POCHEMO, POCHCT in the last 72 hours. Coags:   Lab Results   Component Value Date/Time    PROTIME 10.9 08/11/2022 07:11 PM    INR 1.0 08/11/2022 07:11 PM    APTT 29.3 08/11/2022 07:11 PM       HCG (If Applicable): No results found for: PREGTESTUR, PREGSERUM, HCG, HCGQUANT     ABGs: No results found for: PHART, PO2ART, UZF7NSF, VXO6ZSI, BEART, H4WUAYBI     Type & Screen (If Applicable):  No results found for: LABABO, LABRH    Drug/Infectious Status (If Applicable):  No results found for: HIV, HEPCAB    COVID-19 Screening (If Applicable): No results found for: COVID19      XR R Femur:  Narrative   EXAMINATION:   3 XRAY VIEWS OF THE RIGHT FEMUR       8/12/2022 1:18 pm       COMPARISON:   None.       HISTORY:   ORDERING SYSTEM PROVIDED HISTORY: fx   TECHNOLOGIST PROVIDED HISTORY:   Reason for exam:->fx       FINDINGS:   Three views right femur demonstrate diffuse osteopenia with moderately severe   osteoarthritic changes of the right hip and the right knee.  There is a a   mildly displaced slightly impacted subcapital femoral neck fracture on the   right.  The femoral head remains within the acetabulum.  The findings agree   with the reading of the right hip.         CXR:  Narrative EXAMINATION:   ONE XRAY VIEW OF THE CHEST       8/11/2022 7:01 pm       COMPARISON:   03/16/2006       HISTORY:   ORDERING SYSTEM PROVIDED HISTORY: Pre op   TECHNOLOGIST PROVIDED HISTORY:   Reason for exam:->Pre op       FINDINGS:   The lungs are without acute focal process.  There is no effusion or   pneumothorax. The cardiomediastinal silhouette is without acute process. The   osseous structures are without acute process.               Anesthesia Evaluation  Patient summary reviewed and Nursing notes reviewed no history of anesthetic complications:   Airway: Mallampati: III  TM distance: >3 FB   Neck ROM: full  Mouth opening: > = 3 FB   Dental:          Pulmonary:   (+) COPD (Oxygen saturation 90-96% on RA): no interval change and mild,  decreased breath sounds: bilateral current smoker (60 pyhx.)    (-) sleep apnea          Patient did not smoke on day of surgery. PE comment: Coarse Cardiovascular:  Exercise tolerance: good (>4 METS),   (+) hypertension: moderate, severe and no interval change, BLACKWELL (Mild and chronic which does not interfere with ADL's): no interval change, murmur: Grade 1,     (-) past MI, CAD, CABG/stent, dysrhythmias and  angina    ECG reviewed  Rhythm: regular  Rate: normal           Beta Blocker:  Not on Beta Blocker      ROS comment: EKG:  Sinus tachycardia  Nonspecific ST abnormality  Abnormal ECG  When compared with ECG of 11-AUG-2021 15:02,  No significant change was found     Neuro/Psych:   Negative Neuro/Psych ROS  (+) seizures (Seizure x 1 in 2021 - workup negative & not on antiepileptics): no interval change,    (-) TIA and CVA           GI/Hepatic/Renal:   (+) renal disease (Creatinine 1.6 & GFR 43): CRI,      (-) hepatitis      ROS comment: Diverticulitis. Endo/Other:    (+) blood dyscrasia (ASA; 9.8/29 ): anticoagulation therapy and anemia, arthritis (Managed with Plaquenil): OA and rheumatoid. , .    (-) diabetes mellitus        Pt had no PAT visit        ROS comment: GOUT  S/P right hip fracture Abdominal:   (+) obese,           Vascular: negative vascular ROS. - DVT and PE. Other Findings:           Anesthesia Plan      general     ASA 3     (Modified RSI with HOB at 30 degrees RT  Pre-oxygenation x 3 minutes  20mg Ketamine  PONV prophylaxis)  Induction: intravenous. continuous noninvasive hemodynamic monitor and BIS  MIPS: Postoperative opioids intended and Prophylactic antiemetics administered. Anesthetic plan and risks discussed with patient and spouse. Use of blood products discussed with patient whom consented to blood products. Plan discussed with CRNA and attending. Attending anesthesiologist reviewed and agrees with [id number] [place], APRN - CRNA   8/12/2022    DOS STAFF ADDENDUM:    Patient seen and chart reviewed on DOS. No interval change in history or exam.   I agree with the anesthesia pre-operative assessment written above and have made the appropriate addendums and/or changes. Anesthesia plan discussed and risks/benefits addressed. Patient's concerns and questions answered. NPO >8 hours.      Kyler Garcia DO  August 13, 2022  7:59 AM

## 2022-08-12 NOTE — ACP (ADVANCE CARE PLANNING)
Advance Care Planning   Healthcare Decision Maker:    Primary Decision Maker: Kelsi Yeager - [phone]    Click here to complete Healthcare Decision Makers including selection of the Healthcare Decision Maker Relationship (ie \"Primary\").

## 2022-08-12 NOTE — PROGRESS NOTES
Internal Medicine Progress Note    TO=[facility]    Gianni Mares. [facility]., F.A.CLalitaOLalitaI. Juan J Duke D.O., TONJAO.I. [place] [employer], D.O. Leonora Huger, MSN, APRN, NP-C  Adrian Liz. Phillip Olivo, MSN, APRN-CNP     Primary Care Physician: Arlington Canavan, DO   Admitting Physician:  Bunny Hatchet, DO  Admission date and time: 8/11/2022  5:41 PM    Room:  [address] [place] [address]  Admitting diagnosis: Closed right hip fracture, initial encounter [facility]) [S72.001A]  Closed hip fracture, right, initial encounter [facility]) [facility]    Patient Name: Norbert Burciaga  MRN: [id number]    Date of Service: 8/12/2022     Subjective: Mandi Low is a 81 y.o. male who was seen and examined today,8/12/2022, at the bedside. Mandi Low is a very pleasant 72-year-old gentleman who presented to [facility] [place]'s emergency department yesterday after suffering a fall in his bathroom. This ultimately resulted in a right hip fracture. He is relatively healthy at baseline but does describe chronic tobacco abuse with underlying COPD. He states his respiratory status is at his chronic state of health. He denies any cardiovascular history. He admits to hip pain as to be expected. Review of System:   Constitutional:   Denies fever or chills, weight loss or gain, fatigue or malaise. HEENT:   Denies ear pain, sore throat, sinus or eye problems. Cardiovascular:   Denies any chest pain, irregular heartbeats, or palpitations. Respiratory:   Denies shortness of breath, coughing, sputum production, hemoptysis, or wheezing. Gastrointestinal:   Denies nausea, vomiting, diarrhea, or constipation. Denies any abdominal pain. Genitourinary:    Denies any urgency, frequency, hematuria. Voiding  without difficulty. Extremities:   Hip pain as to be expected in the setting of the fracture. Neurology:    Denies any headache or focal neurological deficits, Denies generalized weakness or memory difficulty.    Psch:   Denies being anxious or depressed. Musculoskeletal:    Denies  myalgias, joint complaints or back pain. Integumentary:   Denies any rashes, ulcers, or excoriations. Denies bruising. Hematologic/Lymphatic:  Denies bruising or bleeding. Physical Exam:  I/O this shift:  In: 20 [I.V.:20]  Out: 200 [Urine:200]    Intake/Output Summary (Last 24 hours) at 8/12/2022 0654  Last data filed at 8/12/2022 1772  Gross per 24 hour   Intake 20 ml   Output 200 ml   Net -180 ml   No intake/output data recorded. Patient Vitals for the past 96 hrs (Last 3 readings):   Weight   08/11/22 2004 175 lb (79.4 kg)   08/11/22 1742 175 lb (79.4 kg)     Vital Signs:   Blood pressure (!) 147/66, pulse 93, temperature 98.1 °F (36.7 °C), temperature source Oral, resp. rate 18, height 5' 8\" (1.727 m), weight 175 lb (79.4 kg), SpO2 90 %. General appearance:  Alert, responsive, oriented to person, place, and time. Well preserved, alert, no distress. Head:  Normocephalic. No masses, lesions or tenderness. Eyes:  PERRLA. EOMI. Sclera clear. Buccal mucosa moist.  ENT:  Ears normal. Mucosa normal.  Neck:    Supple. Trachea midline. No thyromegaly. No JVD. No bruits. Heart:    Rhythm regular. Rate controlled. No murmurs. Lungs:    Symmetrical.  Mild wheezing appreciated this morning  Abdomen:   Soft. Non-tender. Non-distended. Bowel sounds positive. No organomegaly or masses. No pain on palpation. Extremities:    Peripheral pulses present. No peripheral edema. No ulcers. No cyanosis. No clubbing. Neurologic:    Alert x 3. No focal deficit. Cranial nerves grossly intact. No focal weakness. Psych:   Behavior is normal. Mood appears normal. Speech is not rapid and/or pressured. Musculoskeletal:   Spine ROM normal. Muscular strength intact. Gait not assessed. Integumentary:  No rashes  Skin normal color and texture.   Genitalia/Breast:  Deferred    Medication:  Scheduled Meds:   [Held by provider] aspirin  81 mg Oral Daily    allopurinol  100 mg Oral Daily    lisinopril  30 mg Oral Daily    budesonide  0.5 mg Nebulization BID    Arformoterol Tartrate  15 mcg Nebulization BID    ipratropium-albuterol  1 ampule Inhalation Q4H WA    sodium chloride flush  5-40 mL IntraVENous 2 times per day    hydroxychloroquine  200 mg Oral Daily    pantoprazole  40 mg Oral QAM AC     Continuous Infusions:   dextrose      sodium chloride         Objective Data:  CBC with Differential:    Lab Results   Component Value Date/Time    WBC 10.9 08/12/2022 04:51 AM    RBC 2.93 08/12/2022 04:51 AM    HGB 9.8 08/12/2022 04:51 AM    HCT 29.4 08/12/2022 04:51 AM     08/12/2022 04:51 AM    .3 08/12/2022 04:51 AM    MCH 33.4 08/12/2022 04:51 AM    MCHC 33.3 08/12/2022 04:51 AM    RDW 13.2 08/12/2022 04:51 AM    LYMPHOPCT 7.5 08/12/2022 04:51 AM    MONOPCT 5.9 08/12/2022 04:51 AM    BASOPCT 0.2 08/12/2022 04:51 AM    MONOSABS 0.65 08/12/2022 04:51 AM    LYMPHSABS 0.82 08/12/2022 04:51 AM    EOSABS 0.03 08/12/2022 04:51 AM    BASOSABS 0.02 08/12/2022 04:51 AM     BMP:    Lab Results   Component Value Date/Time     08/12/2022 04:51 AM    K 4.6 08/12/2022 04:51 AM    K 4.6 08/11/2022 07:11 PM     08/12/2022 04:51 AM    CO2 23 08/12/2022 04:51 AM    BUN 29 08/12/2022 04:51 AM    LABALBU 3.6 08/12/2022 04:51 AM    CREATININE 1.2 08/12/2022 04:51 AM    CALCIUM 9.2 08/12/2022 04:51 AM    GFRAA >60 08/12/2022 04:51 AM    LABGLOM 60 08/12/2022 04:51 AM    GLUCOSE 108 08/12/2022 04:51 AM       Assessment:  Closed right basicervical femoral neck fracture  Essential hypertension  Non-oxygen dependent COPD with chronic tobacco abuse  History of gout    Plan:   The patient does not describe any previous cardiovascular disease and EKG has been reviewed. He is cleared to move forward with surgical intervention particular from a cardiovascular standpoint. He does suffer from COPD in the setting of longstanding tobacco abuse.   We will maximize nebulized respiratory medications and his respiratory status to allow for surgery. I will discuss timing with the orthopedic team as the patient would be granted clearance for surgical intervention today. We will continue with adequate pain control. Continue current therapy. See orders for further plan of care. More than 50% of my time was spent at the bedside counseling/coordinating care with the patient and/or family with face to face contact. This time was spent reviewing notes and laboratory data as well as instructing and counseling the patient. Time I spent with the family or surrogate(s) is included only if the patient was incapable of providing the necessary information or participating in medical decisions. I also discussed the differential diagnosis and all of the proposed management plans with the patient and individuals accompanying the patient. I am readily available for any further decision-making and intervention.        Harshad Xiong DO, F.A.C.O.I.  8/12/2022  6:54 AM

## 2022-08-12 NOTE — PLAN OF CARE
Problem: Pain  Goal: Verbalizes/displays adequate comfort level or baseline comfort level  Outcome: Progressing     Problem: Skin/Tissue Integrity  Goal: Absence of new skin breakdown  Description: 1. Monitor for areas of redness and/or skin breakdown  2. Assess vascular access sites hourly  3. Every 4-6 hours minimum:  Change oxygen saturation probe site  4. Every 4-6 hours:  If on nasal continuous positive airway pressure, respiratory therapy assess nares and determine need for appliance change or resting period.   Outcome: Progressing     Problem: ABCDS Injury Assessment  Goal: Absence of physical injury  Outcome: Progressing     Problem: Safety - Adult  Goal: Free from fall injury  Outcome: Progressing

## 2022-08-12 NOTE — H&P
Updated H&P    CHIEF COMPLAINT:        Chief Complaint   Patient presents with    Fall       2  hours ago    Leg Pain       Right            leg         HISTORY OF PRESENT ILLNESS:       Gabriela Gomez is a 81 y.o. male with a history of rheumatoid arthritis presenting to the ED for right hip pain after a mechanical fall. He had a fall approximately 4 hours ago while trying to get off the toilet, and his left leg gave out and he collapsed. He denies hitting his head or having any LOC. He was unable to get up after the fall and was unable to crawl. He denies any preemptive symptoms such as headache, nausea, dizziness, lightheadedness. He complains of right hip pain that is worse with any motion. He does not use any assistive devices to ambulate despite having a \"weak leg. \"  He states he is a community ambulator but is not particularly active. His pain remains unchanged and is constant since it began. Denies headache, chest pain, shortness of breath. Denies any other orthopedic complaints at this time. Past Medical History:    Past Medical History             Diagnosis Date    Diverticulitis      Gout      Hypertension      Tobacco abuse           Past Surgical History:    Past Surgical History             Procedure Laterality Date    APPENDECTOMY        CATARACT REMOVAL WITH IMPLANT Right 2 2 15    CATARACT REMOVAL WITH IMPLANT Left 4/6/15    COLONOSCOPY   7 years ago     Dr Misha Jordan         child         Current Medications:   Current Hospital Medications   Current Facility-Administered Medications: fentaNYL (SUBLIMAZE) injection 50 mcg, 50 mcg, IntraVENous, Once     Allergies:  Patient has no known allergies. Social History:   TOBACCO:   reports that he has been smoking cigarettes. He has a 60.00 pack-year smoking history. He has never used smokeless tobacco.  ETOH:   reports current alcohol use. DRUGS:   reports no history of drug use.   ACTIVITIES OF DAILY LIVING: OCCUPATION:    Family History:   Family History             Problem Relation Age of Onset    COPD Mother      Diabetes Mother      Diabetes Father              REVIEW OF SYSTEMS:   Skin: no abnormal pigmentation, rash  Eyes: no blurring or eye pain  Ears/Nose/Throat: no hearing loss, tinnitus  Respiratory: No increased work of breathing, no coughing  Cardiovascular: Brisk capillary refill bilaterally, well perfused extremities  Gastrointestinal: no nausea, vomiting  Neurologic: no paralysis, or seizures  MUSCULOSKELETAL:  positive for  pain and bone pain        PHYSICAL EXAM:    VITALS  BP (!) 147/66   Pulse 93   Temp 98.1 °F (36.7 °C) (Oral)   Resp 18   Ht 5' 8\" (1.727 m)   Wt 175 lb (79.4 kg)   SpO2 92%   BMI 26.61 kg/m²       Constitutional: Oriented to person, place, and time; Answer questions appropriately  HENT: Head: Normocephalic and atraumatic. Eyes: EOMI, LAVELLE  Neck: Supple, trachea midline  Cardiovascular: Brisk capillary refill to all extremities, extremities well perfused  Pulmonary/Chest: No increased work of breathing, no cough  Abdominal: Non-tender, non-distended  Neurologic:  Awake, alert and oriented in three planes. No gross deficits  MUSCULOSKELETAL:  Right lower Extremity:  Skin is intact circumferentially  +TTP about the right hip  He is unable to straight leg raise  compartments soft and compressible  Palpable dorsalis pedis and posterior tibialis pulse, brisk cap refill to toes, foot warm and perfused  Sensation intact to light touch in sural/deep peroneal/superficial peroneal/saphenous/posterior tibial nerve distributions to foot/ankle  Demonstrates active ankle plantar/dorsiflexion/great toe extension  Extremity is shortened and externally rotated while in the bed  Pain with logroll     Secondary Exam:   bilateralUE: No obvious signs of trauma. -TTP to fingers, hand, wrist, forearm, elbow, humerus, shoulder or clavicle. -- Patient able to flex/extend fingers, wrist, elbow and shoulder with active and passive ROM without pain, +2/4 Radial pulse, cap refill <3sec, +AIN/PIN/Radial/Ulnar/Median N, distal sensation grossly intact to C4-T1 dermatomes, compartments soft and compressible. leftLE: No obvious signs of trauma. -TTP to foot, ankle, leg, knee, thigh, hip.-- Patient able to flex/extend toes, ankle, knee and hip with active and passive ROM without pain,+2/4 DP & PT pulses, cap refill <3sec, +5/5 PF/DF/EHL, distal sensation grossly intact to L4-S1 dermatomes, compartments soft and compressible. Pelvis: -TTP, +Heel strike           DATA:    CBC:         Lab Results   Component Value Date/Time     WBC 11.8 06/01/2022 06:47 AM     RBC 3.62 06/01/2022 06:47 AM     HGB 11.9 06/01/2022 06:47 AM     HCT 36.2 06/01/2022 06:47 AM     .0 06/01/2022 06:47 AM     MCH 32.9 06/01/2022 06:47 AM     MCHC 32.9 06/01/2022 06:47 AM     RDW 13.2 06/01/2022 06:47 AM      06/01/2022 06:47 AM     MPV 9.7 06/01/2022 06:47 AM      PT/INR:  No results found for: PROTIME, INR     Radiology Review:  X-ray right hip and pelvis demonstrates a right femoral neck fracture that is displaced and shortened. No other fractures dislocations noted on imaging. Poor bone quality generally noted on imaging. IMPRESSION:  Closed displaced right femoral neck fracture     PLAN:  Nonweightbearing right lower extremity  NPO at midnight  Pain management per primary team  Plan for hemiarthroplasty  Hold anticoagulation  Ice to affected area  PT OT when appropriate  Plan discussed with attending  I was present with the resident during the history and exam. I discussed the case with the resident and agree with the findings and plan as documented in the resident's note.

## 2022-08-13 ENCOUNTER — APPOINTMENT (OUTPATIENT)
Dept: GENERAL RADIOLOGY | Age: 72
DRG: 521 | End: 2022-08-13
Payer: MEDICARE

## 2022-08-13 LAB
ALBUMIN SERPL-MCNC: 3.3 G/DL (ref 3.5–5.2)
ALP BLD-CCNC: 77 U/L (ref 40–129)
ALT SERPL-CCNC: 11 U/L (ref 0–40)
ANION GAP SERPL CALCULATED.3IONS-SCNC: 10 MMOL/L (ref 7–16)
AST SERPL-CCNC: 18 U/L (ref 0–39)
BASOPHILS ABSOLUTE: 0.02 E9/L (ref 0–0.2)
BASOPHILS RELATIVE PERCENT: 0.2 % (ref 0–2)
BILIRUB SERPL-MCNC: 0.4 MG/DL (ref 0–1.2)
BUN BLDV-MCNC: 29 MG/DL (ref 6–23)
CALCIUM SERPL-MCNC: 9.4 MG/DL (ref 8.6–10.2)
CHLORIDE BLD-SCNC: 100 MMOL/L (ref 98–107)
CO2: 24 MMOL/L (ref 22–29)
CREAT SERPL-MCNC: 1.6 MG/DL (ref 0.7–1.2)
EOSINOPHILS ABSOLUTE: 0.11 E9/L (ref 0.05–0.5)
EOSINOPHILS RELATIVE PERCENT: 1 % (ref 0–6)
GFR AFRICAN AMERICAN: 52
GFR NON-AFRICAN AMERICAN: 43 ML/MIN/1.73
GLUCOSE BLD-MCNC: 152 MG/DL (ref 74–99)
HCT VFR BLD CALC: 30.5 % (ref 37–54)
HEMOGLOBIN: 9.8 G/DL (ref 12.5–16.5)
IMMATURE GRANULOCYTES #: 0.06 E9/L
IMMATURE GRANULOCYTES %: 0.5 % (ref 0–5)
LYMPHOCYTES ABSOLUTE: 0.9 E9/L (ref 1.5–4)
LYMPHOCYTES RELATIVE PERCENT: 8 % (ref 20–42)
MAGNESIUM: 2.1 MG/DL (ref 1.6–2.6)
MCH RBC QN AUTO: 32.7 PG (ref 26–35)
MCHC RBC AUTO-ENTMCNC: 32.1 % (ref 32–34.5)
MCV RBC AUTO: 101.7 FL (ref 80–99.9)
MONOCYTES ABSOLUTE: 1.01 E9/L (ref 0.1–0.95)
MONOCYTES RELATIVE PERCENT: 9 % (ref 2–12)
NEUTROPHILS ABSOLUTE: 9.14 E9/L (ref 1.8–7.3)
NEUTROPHILS RELATIVE PERCENT: 81.3 % (ref 43–80)
PDW BLD-RTO: 13.6 FL (ref 11.5–15)
PHOSPHORUS: 3.7 MG/DL (ref 2.5–4.5)
PLATELET # BLD: 261 E9/L (ref 130–450)
PMV BLD AUTO: 9.7 FL (ref 7–12)
POTASSIUM SERPL-SCNC: 4.7 MMOL/L (ref 3.5–5)
RBC # BLD: 3 E12/L (ref 3.8–5.8)
SODIUM BLD-SCNC: 134 MMOL/L (ref 132–146)
TOTAL PROTEIN: 6.2 G/DL (ref 6.4–8.3)
WBC # BLD: 11.2 E9/L (ref 4.5–11.5)

## 2022-08-13 PROCEDURE — 97116 GAIT TRAINING THERAPY: CPT | Performed by: PHYSICAL THERAPIST

## 2022-08-13 PROCEDURE — C1776 JOINT DEVICE (IMPLANTABLE): HCPCS | Performed by: ORTHOPAEDIC SURGERY

## 2022-08-13 PROCEDURE — 7100000000 HC PACU RECOVERY - FIRST 15 MIN: Performed by: ORTHOPAEDIC SURGERY

## 2022-08-13 PROCEDURE — 2580000003 HC RX 258: Performed by: NURSE ANESTHETIST, CERTIFIED REGISTERED

## 2022-08-13 PROCEDURE — 2500000003 HC RX 250 WO HCPCS: Performed by: NURSE ANESTHETIST, CERTIFIED REGISTERED

## 2022-08-13 PROCEDURE — 6370000000 HC RX 637 (ALT 250 FOR IP): Performed by: INTERNAL MEDICINE

## 2022-08-13 PROCEDURE — 3600000005 HC SURGERY LEVEL 5 BASE: Performed by: ORTHOPAEDIC SURGERY

## 2022-08-13 PROCEDURE — 36415 COLL VENOUS BLD VENIPUNCTURE: CPT

## 2022-08-13 PROCEDURE — 84100 ASSAY OF PHOSPHORUS: CPT

## 2022-08-13 PROCEDURE — 6360000002 HC RX W HCPCS: Performed by: NURSE PRACTITIONER

## 2022-08-13 PROCEDURE — 3600000015 HC SURGERY LEVEL 5 ADDTL 15MIN: Performed by: ORTHOPAEDIC SURGERY

## 2022-08-13 PROCEDURE — 1200000000 HC SEMI PRIVATE

## 2022-08-13 PROCEDURE — 85025 COMPLETE CBC W/AUTO DIFF WBC: CPT

## 2022-08-13 PROCEDURE — 6360000002 HC RX W HCPCS: Performed by: ANESTHESIOLOGY

## 2022-08-13 PROCEDURE — 6360000002 HC RX W HCPCS: Performed by: NURSE ANESTHETIST, CERTIFIED REGISTERED

## 2022-08-13 PROCEDURE — 0SRR0JA REPLACEMENT OF RIGHT HIP JOINT, FEMORAL SURFACE WITH SYNTHETIC SUBSTITUTE, UNCEMENTED, OPEN APPROACH: ICD-10-PCS | Performed by: ORTHOPAEDIC SURGERY

## 2022-08-13 PROCEDURE — 88311 DECALCIFY TISSUE: CPT

## 2022-08-13 PROCEDURE — 88305 TISSUE EXAM BY PATHOLOGIST: CPT

## 2022-08-13 PROCEDURE — 3700000001 HC ADD 15 MINUTES (ANESTHESIA): Performed by: ORTHOPAEDIC SURGERY

## 2022-08-13 PROCEDURE — 94640 AIRWAY INHALATION TREATMENT: CPT

## 2022-08-13 PROCEDURE — 2580000003 HC RX 258: Performed by: INTERNAL MEDICINE

## 2022-08-13 PROCEDURE — 27236 TREAT THIGH FRACTURE: CPT | Performed by: ORTHOPAEDIC SURGERY

## 2022-08-13 PROCEDURE — 97110 THERAPEUTIC EXERCISES: CPT | Performed by: PHYSICAL THERAPIST

## 2022-08-13 PROCEDURE — 83735 ASSAY OF MAGNESIUM: CPT

## 2022-08-13 PROCEDURE — 2709999900 HC NON-CHARGEABLE SUPPLY: Performed by: ORTHOPAEDIC SURGERY

## 2022-08-13 PROCEDURE — 7100000001 HC PACU RECOVERY - ADDTL 15 MIN: Performed by: ORTHOPAEDIC SURGERY

## 2022-08-13 PROCEDURE — 6360000002 HC RX W HCPCS

## 2022-08-13 PROCEDURE — 2700000000 HC OXYGEN THERAPY PER DAY

## 2022-08-13 PROCEDURE — 80053 COMPREHEN METABOLIC PANEL: CPT

## 2022-08-13 PROCEDURE — 97530 THERAPEUTIC ACTIVITIES: CPT | Performed by: PHYSICAL THERAPIST

## 2022-08-13 PROCEDURE — 2580000003 HC RX 258: Performed by: FAMILY MEDICINE

## 2022-08-13 PROCEDURE — 3700000000 HC ANESTHESIA ATTENDED CARE: Performed by: ORTHOPAEDIC SURGERY

## 2022-08-13 PROCEDURE — 6360000002 HC RX W HCPCS: Performed by: FAMILY MEDICINE

## 2022-08-13 PROCEDURE — 6360000002 HC RX W HCPCS: Performed by: ORTHOPAEDIC SURGERY

## 2022-08-13 PROCEDURE — 97161 PT EVAL LOW COMPLEX 20 MIN: CPT | Performed by: PHYSICAL THERAPIST

## 2022-08-13 PROCEDURE — 73502 X-RAY EXAM HIP UNI 2-3 VIEWS: CPT

## 2022-08-13 DEVICE — HIP H4 HEMI UNI BIPLR IMPL CAPPED H4: Type: IMPLANTABLE DEVICE | Site: HIP | Status: FUNCTIONAL

## 2022-08-13 DEVICE — HEAD BPLR OD54MM ID28MM FEM HIP BLU POS ECC SELF CNTR: Type: IMPLANTABLE DEVICE | Site: HIP | Status: FUNCTIONAL

## 2022-08-13 DEVICE — IMPL HIP FEM HEAD TAPR 12/14 28MM +5: Type: IMPLANTABLE DEVICE | Site: HIP | Status: FUNCTIONAL

## 2022-08-13 DEVICE — STEM FEM SZ 8 L111MM 12/14 TAPR STD OFFSET HIP DUOFIX CLLRD: Type: IMPLANTABLE DEVICE | Site: HIP | Status: FUNCTIONAL

## 2022-08-13 RX ORDER — PROPOFOL 10 MG/ML
INJECTION, EMULSION INTRAVENOUS PRN
Status: DISCONTINUED | OUTPATIENT
Start: 2022-08-13 | End: 2022-08-13 | Stop reason: SDUPTHER

## 2022-08-13 RX ORDER — LABETALOL HYDROCHLORIDE 5 MG/ML
5 INJECTION, SOLUTION INTRAVENOUS
Status: DISCONTINUED | OUTPATIENT
Start: 2022-08-13 | End: 2022-08-13 | Stop reason: HOSPADM

## 2022-08-13 RX ORDER — FENTANYL CITRATE 50 UG/ML
25 INJECTION, SOLUTION INTRAMUSCULAR; INTRAVENOUS EVERY 5 MIN PRN
Status: DISCONTINUED | OUTPATIENT
Start: 2022-08-13 | End: 2022-08-13 | Stop reason: HOSPADM

## 2022-08-13 RX ORDER — ROCURONIUM BROMIDE 10 MG/ML
INJECTION, SOLUTION INTRAVENOUS PRN
Status: DISCONTINUED | OUTPATIENT
Start: 2022-08-13 | End: 2022-08-13 | Stop reason: SDUPTHER

## 2022-08-13 RX ORDER — NEOSTIGMINE METHYLSULFATE 1 MG/ML
INJECTION, SOLUTION INTRAVENOUS PRN
Status: DISCONTINUED | OUTPATIENT
Start: 2022-08-13 | End: 2022-08-13 | Stop reason: SDUPTHER

## 2022-08-13 RX ORDER — MEPERIDINE HYDROCHLORIDE 25 MG/ML
12.5 INJECTION INTRAMUSCULAR; INTRAVENOUS; SUBCUTANEOUS ONCE
Status: DISCONTINUED | OUTPATIENT
Start: 2022-08-13 | End: 2022-08-13 | Stop reason: HOSPADM

## 2022-08-13 RX ORDER — VANCOMYCIN HYDROCHLORIDE 500 MG/10ML
INJECTION, POWDER, LYOPHILIZED, FOR SOLUTION INTRAVENOUS PRN
Status: DISCONTINUED | OUTPATIENT
Start: 2022-08-13 | End: 2022-08-13 | Stop reason: ALTCHOICE

## 2022-08-13 RX ORDER — DEXAMETHASONE SODIUM PHOSPHATE 10 MG/ML
INJECTION, SOLUTION INTRAMUSCULAR; INTRAVENOUS PRN
Status: DISCONTINUED | OUTPATIENT
Start: 2022-08-13 | End: 2022-08-13 | Stop reason: SDUPTHER

## 2022-08-13 RX ORDER — ASPIRIN 325 MG
325 TABLET, DELAYED RELEASE (ENTERIC COATED) ORAL 2 TIMES DAILY
Qty: 60 TABLET | Refills: 0 | Status: SHIPPED | OUTPATIENT
Start: 2022-08-13 | End: 2022-09-28 | Stop reason: DRUGHIGH

## 2022-08-13 RX ORDER — SODIUM CHLORIDE 9 MG/ML
INJECTION, SOLUTION INTRAVENOUS PRN
Status: DISCONTINUED | OUTPATIENT
Start: 2022-08-13 | End: 2022-08-13 | Stop reason: HOSPADM

## 2022-08-13 RX ORDER — ONDANSETRON 2 MG/ML
INJECTION INTRAMUSCULAR; INTRAVENOUS PRN
Status: DISCONTINUED | OUTPATIENT
Start: 2022-08-13 | End: 2022-08-13 | Stop reason: SDUPTHER

## 2022-08-13 RX ORDER — GLYCOPYRROLATE 0.2 MG/ML
INJECTION INTRAMUSCULAR; INTRAVENOUS PRN
Status: DISCONTINUED | OUTPATIENT
Start: 2022-08-13 | End: 2022-08-13 | Stop reason: SDUPTHER

## 2022-08-13 RX ORDER — SODIUM CHLORIDE 0.9 % (FLUSH) 0.9 %
5-40 SYRINGE (ML) INJECTION EVERY 12 HOURS SCHEDULED
Status: DISCONTINUED | OUTPATIENT
Start: 2022-08-13 | End: 2022-08-15

## 2022-08-13 RX ORDER — SODIUM CHLORIDE 0.9 % (FLUSH) 0.9 %
5-40 SYRINGE (ML) INJECTION PRN
Status: DISCONTINUED | OUTPATIENT
Start: 2022-08-13 | End: 2022-08-13 | Stop reason: HOSPADM

## 2022-08-13 RX ORDER — SODIUM CHLORIDE, SODIUM LACTATE, POTASSIUM CHLORIDE, CALCIUM CHLORIDE 600; 310; 30; 20 MG/100ML; MG/100ML; MG/100ML; MG/100ML
INJECTION, SOLUTION INTRAVENOUS CONTINUOUS PRN
Status: DISCONTINUED | OUTPATIENT
Start: 2022-08-13 | End: 2022-08-13 | Stop reason: SDUPTHER

## 2022-08-13 RX ORDER — SODIUM CHLORIDE 9 MG/ML
INJECTION INTRAVENOUS
Status: DISPENSED
Start: 2022-08-13 | End: 2022-08-13

## 2022-08-13 RX ORDER — SODIUM CHLORIDE 9 MG/ML
INJECTION, SOLUTION INTRAVENOUS PRN
Status: DISCONTINUED | OUTPATIENT
Start: 2022-08-13 | End: 2022-08-15

## 2022-08-13 RX ORDER — SODIUM CHLORIDE 0.9 % (FLUSH) 0.9 %
5-40 SYRINGE (ML) INJECTION EVERY 12 HOURS SCHEDULED
Status: DISCONTINUED | OUTPATIENT
Start: 2022-08-13 | End: 2022-08-13 | Stop reason: HOSPADM

## 2022-08-13 RX ORDER — ONDANSETRON 2 MG/ML
4 INJECTION INTRAMUSCULAR; INTRAVENOUS EVERY 6 HOURS PRN
Status: DISCONTINUED | OUTPATIENT
Start: 2022-08-13 | End: 2022-08-17 | Stop reason: HOSPADM

## 2022-08-13 RX ORDER — CEFAZOLIN 2 G/1
INJECTION, POWDER, FOR SOLUTION INTRAMUSCULAR; INTRAVENOUS
Status: DISPENSED
Start: 2022-08-13 | End: 2022-08-13

## 2022-08-13 RX ORDER — DIPHENHYDRAMINE HYDROCHLORIDE 50 MG/ML
12.5 INJECTION INTRAMUSCULAR; INTRAVENOUS
Status: DISCONTINUED | OUTPATIENT
Start: 2022-08-13 | End: 2022-08-13 | Stop reason: HOSPADM

## 2022-08-13 RX ORDER — OXYCODONE AND ACETAMINOPHEN 10; 325 MG/1; MG/1
1 TABLET ORAL EVERY 6 HOURS PRN
Qty: 28 TABLET | Refills: 0 | Status: SHIPPED | OUTPATIENT
Start: 2022-08-13 | End: 2022-08-20

## 2022-08-13 RX ORDER — CEFAZOLIN SODIUM 2 G/50ML
SOLUTION INTRAVENOUS PRN
Status: DISCONTINUED | OUTPATIENT
Start: 2022-08-13 | End: 2022-08-13 | Stop reason: SDUPTHER

## 2022-08-13 RX ORDER — PROCHLORPERAZINE EDISYLATE 5 MG/ML
5 INJECTION INTRAMUSCULAR; INTRAVENOUS
Status: DISCONTINUED | OUTPATIENT
Start: 2022-08-13 | End: 2022-08-13 | Stop reason: HOSPADM

## 2022-08-13 RX ORDER — HYDRALAZINE HYDROCHLORIDE 20 MG/ML
5 INJECTION INTRAMUSCULAR; INTRAVENOUS
Status: DISCONTINUED | OUTPATIENT
Start: 2022-08-13 | End: 2022-08-13 | Stop reason: HOSPADM

## 2022-08-13 RX ORDER — MIDAZOLAM HYDROCHLORIDE 1 MG/ML
INJECTION INTRAMUSCULAR; INTRAVENOUS PRN
Status: DISCONTINUED | OUTPATIENT
Start: 2022-08-13 | End: 2022-08-13 | Stop reason: SDUPTHER

## 2022-08-13 RX ORDER — SODIUM CHLORIDE 0.9 % (FLUSH) 0.9 %
5-40 SYRINGE (ML) INJECTION PRN
Status: DISCONTINUED | OUTPATIENT
Start: 2022-08-13 | End: 2022-08-15

## 2022-08-13 RX ORDER — ONDANSETRON 4 MG/1
4 TABLET, ORALLY DISINTEGRATING ORAL EVERY 8 HOURS PRN
Status: DISCONTINUED | OUTPATIENT
Start: 2022-08-13 | End: 2022-08-17 | Stop reason: HOSPADM

## 2022-08-13 RX ORDER — SODIUM CHLORIDE 9 MG/ML
INJECTION, SOLUTION INTRAVENOUS CONTINUOUS
Status: DISCONTINUED | OUTPATIENT
Start: 2022-08-13 | End: 2022-08-14

## 2022-08-13 RX ORDER — FENTANYL CITRATE 50 UG/ML
INJECTION, SOLUTION INTRAMUSCULAR; INTRAVENOUS PRN
Status: DISCONTINUED | OUTPATIENT
Start: 2022-08-13 | End: 2022-08-13 | Stop reason: SDUPTHER

## 2022-08-13 RX ORDER — SODIUM CHLORIDE 9 MG/ML
INJECTION, SOLUTION INTRAVENOUS CONTINUOUS PRN
Status: DISCONTINUED | OUTPATIENT
Start: 2022-08-13 | End: 2022-08-13 | Stop reason: SDUPTHER

## 2022-08-13 RX ORDER — LIDOCAINE HYDROCHLORIDE 20 MG/ML
INJECTION, SOLUTION EPIDURAL; INFILTRATION; INTRACAUDAL; PERINEURAL PRN
Status: DISCONTINUED | OUTPATIENT
Start: 2022-08-13 | End: 2022-08-13 | Stop reason: SDUPTHER

## 2022-08-13 RX ADMIN — OXYCODONE 5 MG: 5 TABLET ORAL at 01:59

## 2022-08-13 RX ADMIN — PHENYLEPHRINE HYDROCHLORIDE 100 MCG: 10 INJECTION INTRAVENOUS at 08:48

## 2022-08-13 RX ADMIN — PHENYLEPHRINE HYDROCHLORIDE 100 MCG: 10 INJECTION INTRAVENOUS at 08:37

## 2022-08-13 RX ADMIN — WATER 2000 MG: 1 INJECTION INTRAMUSCULAR; INTRAVENOUS; SUBCUTANEOUS at 12:49

## 2022-08-13 RX ADMIN — IPRATROPIUM BROMIDE AND ALBUTEROL SULFATE 1 AMPULE: .5; 2.5 SOLUTION RESPIRATORY (INHALATION) at 05:05

## 2022-08-13 RX ADMIN — OXYCODONE 10 MG: 5 TABLET ORAL at 20:38

## 2022-08-13 RX ADMIN — PHENYLEPHRINE HYDROCHLORIDE 100 MCG: 10 INJECTION INTRAVENOUS at 08:23

## 2022-08-13 RX ADMIN — CEFAZOLIN SODIUM 2000 MG: 2 SOLUTION INTRAVENOUS at 08:19

## 2022-08-13 RX ADMIN — ARFORMOTEROL TARTRATE 15 MCG: 15 SOLUTION RESPIRATORY (INHALATION) at 16:45

## 2022-08-13 RX ADMIN — ACETAMINOPHEN 650 MG: 325 TABLET ORAL at 15:19

## 2022-08-13 RX ADMIN — FENTANYL CITRATE 100 MCG: 50 INJECTION, SOLUTION INTRAMUSCULAR; INTRAVENOUS at 08:18

## 2022-08-13 RX ADMIN — BUDESONIDE 500 MCG: 0.5 SUSPENSION RESPIRATORY (INHALATION) at 05:05

## 2022-08-13 RX ADMIN — ONDANSETRON 4 MG: 2 INJECTION INTRAMUSCULAR; INTRAVENOUS at 08:52

## 2022-08-13 RX ADMIN — DEXAMETHASONE SODIUM PHOSPHATE 10 MG: 10 INJECTION, SOLUTION INTRAMUSCULAR; INTRAVENOUS at 08:32

## 2022-08-13 RX ADMIN — ARFORMOTEROL TARTRATE 15 MCG: 15 SOLUTION RESPIRATORY (INHALATION) at 05:05

## 2022-08-13 RX ADMIN — LIDOCAINE HYDROCHLORIDE 80 MG: 20 INJECTION, SOLUTION EPIDURAL; INFILTRATION; INTRACAUDAL; PERINEURAL at 08:18

## 2022-08-13 RX ADMIN — SODIUM CHLORIDE: 9 INJECTION, SOLUTION INTRAVENOUS at 06:20

## 2022-08-13 RX ADMIN — SODIUM CHLORIDE, POTASSIUM CHLORIDE, SODIUM LACTATE AND CALCIUM CHLORIDE: 600; 310; 30; 20 INJECTION, SOLUTION INTRAVENOUS at 08:25

## 2022-08-13 RX ADMIN — PROPOFOL 140 MG: 10 INJECTION, EMULSION INTRAVENOUS at 08:18

## 2022-08-13 RX ADMIN — PHENYLEPHRINE HYDROCHLORIDE 100 MCG: 10 INJECTION INTRAVENOUS at 08:31

## 2022-08-13 RX ADMIN — ROCURONIUM BROMIDE 40 MG: 10 SOLUTION INTRAVENOUS at 08:18

## 2022-08-13 RX ADMIN — Medication 3 MG: at 09:53

## 2022-08-13 RX ADMIN — IPRATROPIUM BROMIDE AND ALBUTEROL SULFATE 1 AMPULE: .5; 2.5 SOLUTION RESPIRATORY (INHALATION) at 16:45

## 2022-08-13 RX ADMIN — GLYCOPYRROLATE 0.6 MG: 0.2 INJECTION INTRAMUSCULAR; INTRAVENOUS at 09:53

## 2022-08-13 RX ADMIN — SODIUM CHLORIDE: 9 INJECTION, SOLUTION INTRAVENOUS at 20:42

## 2022-08-13 RX ADMIN — SODIUM CHLORIDE: 9 INJECTION, SOLUTION INTRAVENOUS at 08:04

## 2022-08-13 RX ADMIN — MIDAZOLAM 2 MG: 1 INJECTION INTRAMUSCULAR; INTRAVENOUS at 08:05

## 2022-08-13 RX ADMIN — WATER 2000 MG: 1 INJECTION INTRAMUSCULAR; INTRAVENOUS; SUBCUTANEOUS at 20:32

## 2022-08-13 RX ADMIN — ROCURONIUM BROMIDE 10 MG: 10 SOLUTION INTRAVENOUS at 09:29

## 2022-08-13 RX ADMIN — IPRATROPIUM BROMIDE AND ALBUTEROL SULFATE 1 AMPULE: .5; 2.5 SOLUTION RESPIRATORY (INHALATION) at 13:39

## 2022-08-13 RX ADMIN — HYDROMORPHONE HYDROCHLORIDE 1 MG: 1 INJECTION, SOLUTION INTRAMUSCULAR; INTRAVENOUS; SUBCUTANEOUS at 04:22

## 2022-08-13 RX ADMIN — BUDESONIDE 500 MCG: 0.5 SUSPENSION RESPIRATORY (INHALATION) at 16:45

## 2022-08-13 RX ADMIN — OXYCODONE 10 MG: 5 TABLET ORAL at 15:19

## 2022-08-13 RX ADMIN — GLYCOPYRROLATE 0.2 MG: 0.2 INJECTION INTRAMUSCULAR; INTRAVENOUS at 08:22

## 2022-08-13 RX ADMIN — HYDROMORPHONE HYDROCHLORIDE 0.5 MG: 1 INJECTION, SOLUTION INTRAMUSCULAR; INTRAVENOUS; SUBCUTANEOUS at 07:29

## 2022-08-13 RX ADMIN — SODIUM CHLORIDE: 9 INJECTION, SOLUTION INTRAVENOUS at 09:54

## 2022-08-13 ASSESSMENT — PAIN DESCRIPTION - LOCATION
LOCATION: HIP
LOCATION: HIP
LOCATION: SHOULDER;HIP
LOCATION: HIP
LOCATION: HIP
LOCATION: HIP;SHOULDER

## 2022-08-13 ASSESSMENT — PAIN DESCRIPTION - DESCRIPTORS
DESCRIPTORS: ACHING
DESCRIPTORS: BURNING
DESCRIPTORS: ACHING
DESCRIPTORS: BURNING
DESCRIPTORS: ACHING
DESCRIPTORS: ACHING

## 2022-08-13 ASSESSMENT — PAIN SCALES - GENERAL
PAINLEVEL_OUTOF10: 5
PAINLEVEL_OUTOF10: 3
PAINLEVEL_OUTOF10: 7
PAINLEVEL_OUTOF10: 3
PAINLEVEL_OUTOF10: 3
PAINLEVEL_OUTOF10: 6

## 2022-08-13 ASSESSMENT — PAIN DESCRIPTION - ORIENTATION
ORIENTATION: RIGHT
ORIENTATION: RIGHT;LEFT
ORIENTATION: RIGHT

## 2022-08-13 ASSESSMENT — PAIN DESCRIPTION - PAIN TYPE
TYPE: SURGICAL PAIN
TYPE: SURGICAL PAIN

## 2022-08-13 ASSESSMENT — ENCOUNTER SYMPTOMS: DYSPNEA ACTIVITY LEVEL: NO INTERVAL CHANGE

## 2022-08-13 NOTE — DISCHARGE INSTRUCTIONS
Orthopedics Discharge Instructions   Weight bearing Status -Weight bearing as tolerated - Operative Extremity  Pain medication Per Prescription  Ice to operative/injured site for 15-30 minutes of each hour for next 3-5 days           Elevate operative/injured limb on 2 pillows at home  Continue DVT Prophylaxis as Prescribed  OK to remove Aquacel dressing on post op day seven. OK to shower on post op day 2. No baths or lotions. Follow posterior hip precautions  Follow Up in Office in 2 weeks with Dr. Celine Ho, staples will have to be removed     Call the office at [phone] for directions or with any questions. Watch for these significant complications. Call physician if they or any other problems occur:  Fever over 101°, redness, swelling or warmth at the operative site  Unrelieved nausea                          Foul smelling or cloudy drainage at the operative site       Unrelieved pain                   Blood soaked dressing. (Some oozing may be normal)                Numb, pale, blue, cold or tingling extremity     The following personal items were collected during your admission and were returned to you:    Belongings  Dental Appliances: None  Vision - Corrective Lenses: Eyeglasses, At home  Hearing Aid: None  Clothing: Pants  Jewelry: Watch  Body Piercings Removed: N/A  Electronic Devices: Cell Phone, At bedside  Weapons (Notify Protective Services/Security): None  Other Valuables: Sent home  Home Medications: Sent home  Valuables Given To: Family (Comment) (Sister)  Provide Name(s) of Who Valuable(s) Were Given To: Audrey Small  Responsible person(s) in the waiting room: Sister  Patient approves for provider to speak to responsible person post operatively: Yes    Information obtained by:  By signing below, I understand that if any problems occur once I leave the hospital I am to contact Dr. Wilfred White. I understand and acknowledge receipt of the instructions indicated above.

## 2022-08-13 NOTE — ANESTHESIA POSTPROCEDURE EVALUATION
Department of Anesthesiology  Postprocedure Note    Patient: Monika Metz  MRN: [id number]  YOB: 1950  Date of evaluation: 8/13/2022      Procedure Summary     Date: 08/13/22 Room / Location: [address] [place] [address] / [address] [place] [address]    Anesthesia Start: 1213 Anesthesia Stop: 1027    Procedure: RIGHT HIP HEMIARTHROPLASTY (Right: Hip) Diagnosis:       S/P right hip fracture      (S/P right hip fracture [Z87.81])    Surgeons: Delfina Salomon DO Responsible Provider: [address] [place] [address]    Anesthesia Type: general ASA Status: 3          Anesthesia Type: No value filed. Benito Phase I: Benito Score: 9    Benito Phase II:        Anesthesia Post Evaluation    Patient location during evaluation: bedside  Patient participation: complete - patient participated  Level of consciousness: awake  Pain score: 3  Airway patency: patent  Nausea & Vomiting: no vomiting and no nausea  Complications: no  Cardiovascular status: hemodynamically stable  Respiratory status: acceptable  Hydration status: stable  Comments: Seen and examined. Progressing as expected. No questions or concerns.

## 2022-08-13 NOTE — PROGRESS NOTES
Internal Medicine Progress Note    TO=[facility]    Jodi Mendoza. Ar Dumont., TONJAOLalitaI. Yanet Corado D.O., TONJAONEHA Hernandez D.O. Torres Diggs, MSN, APRN, NP-C  Norma Gupta. Shanel Pa, MSN, APRN-CNP     Primary Care Physician: George Yoder DO   Admitting Physician:  Gracy Jimenez DO  Admission date and time: 8/11/2022  5:41 PM    Room:  [address] [place] [address]  Admitting diagnosis: Closed right hip fracture, initial encounter [facility]) [S72.001A]  Closed hip fracture, right, initial encounter [facility]) [facility]    Patient Name: Brenda Garland  MRN: [id number]    Date of Service: 8/13/2022     Subjective: Bertha Ornelas is a 81 y.o. male who was seen and examined today,8/13/2022, at the bedside. Bertha Ornelas is scheduled to undergo orthopedic intervention this morning. He describes ongoing hip pain as to be expected. Review of System:   Constitutional:   Denies fever or chills, weight loss or gain, fatigue or malaise. HEENT:   Denies ear pain, sore throat, sinus or eye problems. Cardiovascular:   Denies any chest pain, irregular heartbeats, or palpitations. Respiratory:   Denies shortness of breath, coughing, sputum production, hemoptysis, or wheezing. Gastrointestinal:   Denies nausea, vomiting, diarrhea, or constipation. Denies any abdominal pain. Genitourinary:    Denies any urgency, frequency, hematuria. Voiding  without difficulty. Extremities:   Hip pain as to be expected in the setting of the fracture. Neurology:    Denies any headache or focal neurological deficits, Denies generalized weakness or memory difficulty. Psch:   Denies being anxious or depressed. Musculoskeletal:    Denies  myalgias, joint complaints or back pain. Integumentary:   Denies any rashes, ulcers, or excoriations. Denies bruising. Hematologic/Lymphatic:  Denies bruising or bleeding.     Physical Exam:  I/O this shift:  In: 10 [I.V.:10]  Out: 500 [Urine:500]    Intake/Output Summary (Last 24 hours) at 8/13/2022 0646  Last data filed at 8/13/2022 6116  Gross per 24 hour   Intake 260 ml   Output 2150 ml   Net -1890 ml   I/O last 3 completed shifts: In: 270 [P.O.:240; I.V.:30]  Out: 1850 [Urine:1850]  Patient Vitals for the past 96 hrs (Last 3 readings):   Weight   08/11/22 2004 175 lb (79.4 kg)   08/11/22 1742 175 lb (79.4 kg)     Vital Signs:   Blood pressure (!) 144/59, pulse 98, temperature 99.3 °F (37.4 °C), temperature source Oral, resp. rate 16, height 5' 8\" (1.727 m), weight 175 lb (79.4 kg), SpO2 93 %. General appearance:  Alert, responsive, oriented to person, place, and time. Well preserved, alert, no distress. Head:  Normocephalic. No masses, lesions or tenderness. Eyes:  PERRLA. EOMI. Sclera clear. Buccal mucosa moist.  ENT:  Ears normal. Mucosa normal.  Neck:    Supple. Trachea midline. No thyromegaly. No JVD. No bruits. Heart:    Rhythm regular. Rate controlled. No murmurs. Lungs:    Symmetrical.  Mild wheezing appreciated this morning  Abdomen:   Soft. Non-tender. Non-distended. Bowel sounds positive. No organomegaly or masses. No pain on palpation. Extremities:    Peripheral pulses present. No peripheral edema. No ulcers. No cyanosis. No clubbing. Neurologic:    Alert x 3. No focal deficit. Cranial nerves grossly intact. No focal weakness. Psych:   Behavior is normal. Mood appears normal. Speech is not rapid and/or pressured. Musculoskeletal:   Spine ROM normal. Muscular strength intact. Gait not assessed. Integumentary:  No rashes  Skin normal color and texture.   Genitalia/Breast:  Deferred    Medication:  Scheduled Meds:   [Held by provider] aspirin  81 mg Oral Daily    [Held by provider] allopurinol  100 mg Oral Daily    [Held by provider] lisinopril  30 mg Oral Daily    budesonide  0.5 mg Nebulization BID    Arformoterol Tartrate  15 mcg Nebulization BID    ipratropium-albuterol  1 ampule Inhalation Q4H WA    sodium chloride flush  5-40 mL IntraVENous 2 times per day    hydroxychloroquine  200 mg Oral Daily    pantoprazole  40 mg Oral QAM AC     Continuous Infusions:   sodium chloride 75 mL/hr at 08/13/22 0620    dextrose      sodium chloride         Objective Data:  CBC with Differential:    Lab Results   Component Value Date/Time    WBC 11.2 08/13/2022 04:47 AM    RBC 3.00 08/13/2022 04:47 AM    HGB 9.8 08/13/2022 04:47 AM    HCT 30.5 08/13/2022 04:47 AM     08/13/2022 04:47 AM    .7 08/13/2022 04:47 AM    MCH 32.7 08/13/2022 04:47 AM    MCHC 32.1 08/13/2022 04:47 AM    RDW 13.6 08/13/2022 04:47 AM    LYMPHOPCT 8.0 08/13/2022 04:47 AM    MONOPCT 9.0 08/13/2022 04:47 AM    BASOPCT 0.2 08/13/2022 04:47 AM    MONOSABS 1.01 08/13/2022 04:47 AM    LYMPHSABS 0.90 08/13/2022 04:47 AM    EOSABS 0.11 08/13/2022 04:47 AM    BASOSABS 0.02 08/13/2022 04:47 AM     BMP:    Lab Results   Component Value Date/Time     08/13/2022 04:47 AM    K 4.7 08/13/2022 04:47 AM    K 4.6 08/11/2022 07:11 PM     08/13/2022 04:47 AM    CO2 24 08/13/2022 04:47 AM    BUN 29 08/13/2022 04:47 AM    LABALBU 3.3 08/13/2022 04:47 AM    CREATININE 1.6 08/13/2022 04:47 AM    CALCIUM 9.4 08/13/2022 04:47 AM    GFRAA 52 08/13/2022 04:47 AM    LABGLOM 43 08/13/2022 04:47 AM    GLUCOSE 152 08/13/2022 04:47 AM       Assessment:  Closed right basicervical femoral neck fracture  Mild renal insufficiency that is multifactorial  Essential hypertension  Non-oxygen dependent COPD with chronic tobacco abuse  History of gout    Plan:   Preoperative EKG has been reviewed and 2D echocardiogram preliminary report has been reviewed. The patient is cleared for surgical intervention as the patient has stable cardiac function. He has developed mild renal insufficiency that is multifactorial and renal toxic medications will be temporarily held. Gentle fluid resuscitation will be undertaken.   We will continue with adequate pain control and he will require extensive work with the therapy teams post procedure. Discharge planning will be largely based on his ability to function independently. More than 50% of my time was spent at the bedside counseling/coordinating care with the patient and/or family with face to face contact. This time was spent reviewing notes and laboratory data as well as instructing and counseling the patient. Time I spent with the family or surrogate(s) is included only if the patient was incapable of providing the necessary information or participating in medical decisions. I also discussed the differential diagnosis and all of the proposed management plans with the patient and individuals accompanying the patient. I am readily available for any further decision-making and intervention.        Pascual Blas DO, ROSHAN.A.C.O.I.  8/13/2022  6:46 AM

## 2022-08-13 NOTE — ED PROVIDER NOTES
Diabetes in his father and mother. . Unless otherwise noted, family history is non contributory     The patients home medications have been reviewed. Allergies: Patient has no known allergies. I have reviewed the past medical history, past surgical history, social history, and family history     ---------------------------------------------------PHYSICAL EXAM--------------------------------------     Constitutional/General: Alert and oriented x3  Head: Normocephalic and atraumatic  Eyes: PERRL, EOMI, sclera non icteric  ENT: Oropharynx clear, handling secretions, no trismus, no asymmetry of the posterior oropharynx or uvular edema  Neck: Supple, full ROM, no stridor, no meningeal signs  Respiratory: Lungs clear to auscultation bilaterally, no wheezes, rales, or rhonchi. Not in respiratory distress  Cardiovascular:  Regular rate. Regular rhythm. No murmurs, no gallops, no rubs. 2+ distal pulses. Equal extremity pulses. Chest: No chest wall tenderness  Gastrointestinal:  Abdomen Soft, Non tender, Non distended. No rebound, guarding, or rigidity. No pulsatile masses. Musculoskeletal: Moves all extremities x 4. Warm and well perfused, no clubbing, no cyanosis, no edema. Capillary refill <3 seconds  right Lower Extremity  Skin is intact circumferentially  +TTP about the right hip  Extremity is shortened and externally rotated while lying in the bed  compartments soft and compressible  Palpable dorsalis pedis and posterior tibialis pulse, brisk cap refill to toes, foot warm and perfused  Sensation intact to light touch in sural/deep peroneal/superficial peroneal/saphenous/posterior tibial nerve distributions to foot/ankle  Demonstrates active ankle plantar/dorsiflexion/great toe extension     Skin: skin warm and dry. No rashes.   Neurologic: GCS 15, no focal deficits, symmetric strength 5/5 in the upper and lower extremities bilaterally  Psychiatric: Normal Affect -------------------------------------------------- RESULTS -------------------------------------------------  I have personally reviewed all laboratory and imaging results for this patient. Results are listed below. LABS: (Lab results interpreted by me)        Results for orders placed or performed during the hospital encounter of 08/11/22   EKG 12 Lead   Result Value Ref Range     Ventricular Rate 104 BPM     Atrial Rate 104 BPM     P-R Interval 176 ms     QRS Duration 80 ms     Q-T Interval 344 ms     QTc Calculation (Bazett) 452 ms     P Axis 27 degrees     R Axis 53 degrees     T Axis 44 degrees   ,        RADIOLOGY:  Interpreted by Radiologist unless otherwise specified  XR HIP 2-3 VW W PELVIS RIGHT   Final Result   1. Minimally displaced fracture at the right femoral neck. 2. Degenerative change at the bilateral hips. XR CHEST PORTABLE    (Results Pending)            ------------------------- NURSING NOTES AND VITALS REVIEWED ---------------------------              The nursing notes within the ED encounter and vital signs as below have been reviewed by myself  BP (!) 146/93   Pulse (!) 103   Temp 97.8 °F (36.6 °C) (Oral)   Resp 20   Ht 5' 8\" (1.727 m)   Wt 175 lb (79.4 kg)   SpO2 94%   BMI 26.61 kg/m²         The patients available past medical records and past encounters were reviewed.           ------------------------------ ED COURSE/MEDICAL DECISION MAKING----------------------  Medications   oxyCODONE (ROXICODONE) immediate release tablet 5 mg (has no administration in time range)     Or   oxyCODONE (ROXICODONE) immediate release tablet 10 mg (has no administration in time range)   morphine (PF) injection 2 mg (has no administration in time range)     Or   morphine injection 4 mg (has no administration in time range)   fentaNYL (SUBLIMAZE) injection 50 mcg (50 mcg IntraVENous Given 8/11/22 1834)            I, Dr. Tommy Richards, am the primary provider of record     Medical Decision Making:   Mechanical fall from standing height approximately 4 hours ago. Patient was unable to get up or ambulate after the fall. He has immediate pain that is constant and unchanged since it began. Right lower extremity is shortened and externally rotated in the bed. Hip and pelvis x-rays ordered with concern for hip fracture. Re-Evaluations:     Re-examination  8/11/22   5:52 PM EDT           Vital Signs:            Vitals       Vitals:     08/11/22 1742   BP: (!) 146/93   Pulse: (!) 103   Resp: 20   Temp: 97.8 °F (36.6 °C)   TempSrc: Oral   SpO2: 94%   Weight: 175 lb (79.4 kg)   Height: 5' 8\" (1.727 m)         Card/Pulm:  Rhythm: normal rate. Heart Sounds: no murmurs, gallops, or rubs. clear to auscultation, no wheezes or rales, and unlabored breathing. Capillary Refill: normal.  Radial Pulse:  present 2+. Skin:  Dry and Warm. This patient's ED course included: a personal history and physicial examination     This patient has remained hemodynamically stable during their ED course. Consultations:  Orthopedic surgery                 Counseling: The emergency provider has spoken with the patient and discussed todays results, in addition to providing specific details for the plan of care and counseling regarding the diagnosis and prognosis. Questions are answered at this time and they are agreeable with the plan.         --------------------------------- IMPRESSION AND DISPOSITION ---------------------------------     IMPRESSION  1. Closed right hip fracture, initial encounter ([facility]          DISPOSITION  Disposition: Admit to med/surg floor  Patient condition is stable           NOTE: This report was transcribed using voice recognition software.  Every effort was made to ensure accuracy; however, inadvertent computerized transcription errors may be present                  ATTENDING PROVIDER ATTESTATION:     I,  Dr. Peggye Cabot am the primary physician of record for this patient. Jaylan Walsh presented to the emergency department for evaluation of Fall (2  hours ago) and Leg Pain (Right            leg)   and was initially evaluated by the Medical Resident. See Original ED Note for H&P and ED course above. I have reviewed and discussed the case, including pertinent history (medical, surgical, family and social) and exam findings with the Medical Resident assigned to Jaylan Walsh. I have personally performed and/or participated in the history, exam, medical decision making, and procedures and agree with all pertinent clinical information. I have reviewed my findings and recommendations with the assigned Medical Resident, Jaylan Walsh and members of family present at the time of disposition. My findings/plan: The primary encounter diagnosis was Closed right hip fracture, initial encounter ([facility] A diagnosis of S/P right hip fracture was also pertinent to this visit. Current Discharge Medication List        START taking these medications    Details   aspirin 325 MG EC tablet Take 1 tablet by mouth in the morning and 1 tablet before bedtime. Qty: 60 tablet, Refills: 0      oxyCODONE-acetaminophen (PERCOCET)  MG per tablet Take 1 tablet by mouth every 6 hours as needed for Pain for up to 7 days.  Intended supply: 30 days  Qty: 28 tablet, Refills: 0    Comments: Reduce doses taken as pain becomes manageable  Associated Diagnoses: Closed right hip fracture, initial encounter ([facility]           Catherine Zavala, [address] [facility],   08/13/22 2478

## 2022-08-13 NOTE — PLAN OF CARE
Problem: Pain  Goal: Verbalizes/displays adequate comfort level or baseline comfort level  8/13/2022 0130 by Ulises Buitrago RN  Outcome: Progressing  8/12/2022 1727 by Quinn Solitario RN  Outcome: Progressing  8/12/2022 1716 by Karilyn Sandhoff, RN  Outcome: Progressing     Problem: Skin/Tissue Integrity  Goal: Absence of new skin breakdown  Description: 1. Monitor for areas of redness and/or skin breakdown  2. Assess vascular access sites hourly  3. Every 4-6 hours minimum:  Change oxygen saturation probe site  4. Every 4-6 hours:  If on nasal continuous positive airway pressure, respiratory therapy assess nares and determine need for appliance change or resting period.   8/13/2022 0130 by Ulises Biutrago RN  Outcome: Progressing  8/12/2022 1727 by Quinn Solitario RN  Outcome: Progressing  8/12/2022 1716 by Karilyn Sandhoff, RN  Outcome: Progressing     Problem: Safety - Adult  Goal: Free from fall injury  8/13/2022 0130 by Ulises Buitrago RN  Outcome: Progressing  8/12/2022 1727 by Quinn Solitario RN  Outcome: Progressing  8/12/2022 1716 by Karilyn Sandhoff, RN  Outcome: Progressing

## 2022-08-13 NOTE — OP NOTE
Operative Note      Patient: Sawyer Parkinson  YOB: 1950  MRN: [id number]    Date of Procedure: 8/13/2022    Pre-Op Diagnosis: S/P right hip fracture [Z87.81]    Post-Op Diagnosis: Same       Procedure(s):  RIGHT HIP HEMIARTHROPLASTY    Surgeon(s): Neomi Bosworth, DO    Assistant:   Resident: Wilfred Steinberg DO; Jose Angel Perez DO    Anesthesia: General    Estimated Blood Loss (mL): 036 cc    Complications: None    Specimens:   ID Type Source Tests Collected by Time Destination   A : BONE RIGHT HIP Bone Bone [facility],  8/13/2022 0930        Implants:  Implant Name Type Inv. Item Serial No.  Lot No. LRB No. Used Action   IMPL HIP FEM HEAD TAPR 12/14 28MM +5 - [id number] Hip IMPL HIP FEM HEAD TAPR 12/14 28MM +5  JN: [facility] [id number] Right 1 Implanted   HEAD BPLR OD54MM ID28MM FEM HIP STEPHANIE POS [employer] SELF CNTR - [id number]  HEAD BPLR OD54MM ID28MM FEM HIP STEPHANIE POS ECC SELF CNTR  [facility] [employer] [id number] Right 1 Implanted   STEM FEM SZ 8 L111MM 12/14 TAPR STD OFFSET HIP DUOFIX [facility] - [id number]  STEM FEM SZ 8 L111MM 12/14 TAPR STD OFFSET HIP DUOFIX CLLRD  [facility] [employer] [id number] Right 1 Implanted         Drains: * No LDAs found *    Findings: Comminuted right femoral neck fracture    Detailed Description of Procedure:     OPERATIVE COURSE:  The patient was seen and identified outside the operative suite, in which the operative site was marked as appropriate by patient, surgeon, staff, and anesthesia. The patient was then taken into the operative suite, transferred to the operative table with all bony prominences and neurovascular structures well padded and protected. The patient was sedated under the care of the anesthesia team. The operative site was prepped and draped in standard sterile fashion.     Using a posterior approach, an incision was made through skin,  subcutaneous tissue, dissected down to the level of the subcutaneous tissue maintaining adequate hemostasis. Incision was dissected down to the  level of the IT band and gluteus fe fascia. The fascia was then incised  directly centered over the tip of the greater trochanter. Using curved royal scissors,  fascia was relsased proximally and distally into the gluteus  maximums fascia and distally along the femoral shaft, exposing underlying  greater trochanteric bursa and short external rotator fat. Charnley retractor  was placed in the wound after identifying and avoiding course of the sciatic nerve. Curved retractors were placed above the piriformis and  within the proximal aspect of the quadratus. The piriformis was released off the bone, tagged and retracted, exposing the underlying posterior hip capsule. It was then T'd in a fashion to expose the  underlying femoral head and neck. The fracture was identified. The femoral  neck was then osteotomized using an oscillating saw roughly 1cm above the lesser trochanteric and extended in a 45 degree slope towards the greater trochanteric region. . The femoral head was then removed from the acetabulum with a skid and a femoral head extractor. Size was measured and measured to be a 54 bipolar head. It appeared to be the best fit with a great suction fit and had good free motion. The femoral canal was  then exposed using a Hawkins retractor. Box chisel was used to open  up the femoral shaft followed by a starting canal. The proximal femur was broached up to a size 8 implant and trialed this. The stem had  great fixation within the femoral shaft. A size 54 bipolar head with a standard neck was trailed and found to be very stable. All trail implants were removed. The femur was copiously irrigated and the final implant was positioned within the appropriate  amount of version by pushing laterally to prevent varus angulation. Once impacted to the correct level, bipolar head was impacted on the femoral  neck.  The proximal femur was then reduced to the acetabulum. Patient demonstrated  great fit with no signs of instability on range of motion examination. It was then thoroughly irrigated prior to closure. Capsular incision with was closed with FiberTape in figure of 8 fashion. IT band was then closed with FiberTape in figure of 8  Fashion followed by Starafix. Closed the subcutaneous layer with 2-0 Vicryl followed by skin  staple. Sterile dressing was placed on the wound. Patient recovered in the  recovery room without difficulty. Patient tolerated the procedure well. DISPOSITION: The patient was taken to PACU in stable condition. Once stable, the patient will be transferred to the floor. Orders have been provided to begin physical therapy, weight bear as tolerated Right lower extremity. Patient received a dose of ancef preoperatively. We will continue this for 24 hours postoperatively for infection prophylaxis. The patient will also be started on aspirin twice daily for DVT prophylaxis. We have consulted  and case management for discharge planning and consulted the PCP for medical management. It should be noted that Dr. Kylie Aguirre was present for the entirety of the procedure. I agree with above  I was present and performed all critical aspects of the procedure.         Electronically signed by Georgie Martinez DO on 8/13/2022 at 10:29 AM

## 2022-08-13 NOTE — PROGRESS NOTES
Physical Therapy  Physical Therapy Initial Evaluation/Plan of Care    Room #:  0315/0315-01  Patient Name: Domo See  YOB: 1950  MRN: [id number]    Date of Service: 8/13/2022     Tentative placement recommendation: Subacute rehab or Acute rehab  Equipment recommendation: To be determined      Evaluating Physical Therapist: Nayeli Murdock, PT, DPT #[id number]      Specific Provider Orders/Date/Referring Provider :     08/13/22 1215    PT eval and treat  Start:  08/13/22 1215,   End:  08/13/22 1215,   ONE TIME,   Standing Count:  1 Occurrences,   R         Renzo Rivera, DO Acknowledge New     Admitting Diagnosis:   Closed right hip fracture, initial encounter [facility]) [S72.001A]  Closed hip fracture, right, initial encounter [facility]) Evaristo Vuong      Surgery: R hip hemiarthroplasty on 8/13/22  Visit Diagnoses         Codes    Closed right hip fracture, initial encounter ([facility]    -  Primary S72.001A    S/P right hip fracture     Z87.81            Patient Active Problem List   Diagnosis    Closed right hip fracture, initial encounter [facility])        ASSESSMENT of Current Deficits Patient exhibits decreased strength, balance, and endurance impairing functional mobility, transfers, gait , gait distance, and tolerance to activity. Pt required increased time for all activity and Sha for all activity. Pt informed of hip precautions and able to maintain throughout the rest of the session. Pt able to perform seated exercises with AROM during session.         PHYSICAL THERAPY  PLAN OF CARE       Physical therapy plan of care is established based on physician order,  patient diagnosis and clinical assessment    Current Treatment Recommendations:    -Bed Mobility: Lower extremity exercises  and Trunk control activities   -Sitting Balance: Incorporate reaching activities to activate trunk muscles , Hands on support to maintain midline , Facilitate active trunk muscle engagement , Facilitate postural control in all planes , and Engage in core activities to allow for movement within base of support   -Standing Balance: Perform strengthening exercises in standing to promote motor control with or without upper extremity support , Instruct patient on adequate base of support to maintain balance, and Challenge balance utilizing reaching  activities beyond center of gravity    -Transfers: Provide instruction on proper hand and foot position for adequate transfer of weight onto lower extremities and use of gait device if needed, Cues for hand placement, technique and safety. Provide stabilization to prevent fall , Facilitate weight shift forward on to lower extremities and provide necessary stabilization of bilateral lower extremities , Support transfer of weight on to lower extremities, and Assist with extension of knees trunk and hip to accept weight transfer   -Gait: Gait training, Standing activities to improve: base of support, weight shift, weight bearing , Exercises to improve trunk control, Exercises to improve hip and knee control, Performance of protected weight bearing activities, and Activities to increase weight bearing   -Endurance: Utilize Supervised activities to increase level of endurance to allow for safe functional mobility including transfers and gait  and Use graduated activities to promote good breathing techniques and provide support and education to maximize respiratory function  -Stairs: Stair training with instruction on proper technique and hand placement on rail    PT long term treatment goals are located in below grid    Patient and or family understand(s) diagnosis, prognosis, and plan of care. Frequency of treatments: Patient will be seen  twice daily.          Prior Level of Function: Patient ambulated independently   Rehab Potential: good  for baseline    Past medical history:   Past Medical History:   Diagnosis Date    Diverticulitis     Gout     Hypertension     Tobacco abuse      Past Surgical History: Procedure Laterality Date    APPENDECTOMY      CATARACT REMOVAL WITH IMPLANT Right 2 2 15    CATARACT REMOVAL WITH IMPLANT Left 4/6/15    COLONOSCOPY  7 years ago    Dr Nena Rosales      child       SUBJECTIVE:    Precautions: Up with assistance, falls, full weight bearing RLE, and hip precautions      Social history: Patient lives alone in a ranch home  with 3 steps  to enter with Rail  Tub shower     Equipment owned: [address]   How much difficulty turning over in bed?: A Little  How much difficulty sitting down on / standing up from a chair with arms?: A Little  How much difficulty moving from lying on back to sitting on side of bed?: A Little  How much help from another person moving to and from a bed to a chair?: A Little  How much help from another person needed to walk in hospital room?: A Little  How much help from another person for climbing 3-5 steps with a railing?: A Lot  AM-PAC Inpatient Mobility Raw Score : 17  AM-PAC Inpatient T-Scale Score : 42.13  Mobility Inpatient CMS 0-100% Score: 50.57  Mobility Inpatient CMS G-Code Modifier : CK    Nursing cleared patient for PT evaluation. The admitting diagnosis and active problem list as listed above have been reviewed prior to the initiation of this evaluation. OBJECTIVE;   Initial Evaluation  Date: 8/13/2022 Treatment Date:     Short Term/ Long Term   Goals   Was pt agreeable to Eval/treatment? Yes  To be met in 2 days   Pain level   6/10  R hip     Bed Mobility    Rolling: Supervision     Supine to sit: Minimal assist of 1    Sit to supine: Minimal assist of 1    Scooting: Supervision     Rolling: Independent    Supine to sit:  Independent    Sit to supine: Independent    Scooting: Independent     Transfers Sit to stand:  Min-ModA    Sit to stand: Modified Independent    Ambulation     25 feet using  wheeled walker with Minimal assist of 1   for walker control, balance, upright, and safety    > 75 feet using  wheeled walker with Modified Independent    Stair negotiation: ascended and descended   Not assessed     3 steps with 1 HR with Mod I   ROM Within functional limits    Increase range of motion 10% of affected joints    Strength BUE:  refer to OT eval  RLE:  3+/5  LLE:  4+/5  Increase strength in affected mm groups by 1/3 grade   Balance Sitting EOB:  fair +  Dynamic Standing:  fair   Sitting EOB:  good   Dynamic Standing: fair +     Patient is Alert & Oriented x person, place, time, and situation and follows directions    Sensation:  Patient  denies numbness/tingling   Edema:  no   Endurance: fair -    Vitals: room air   Blood Pressure at rest  Blood Pressure during session    Heart Rate at rest  Heart Rate during session    SPO2 at rest %  SPO2 during session %     Patient education  Patient educated on role of Physical Therapy, risks of immobility, safety and plan of care, energy conservation,  importance of mobility while in hospital , ankle pumps, quad set and glut set for edema control, blood clot prevention, importance and purpose of adaptive device and adjusted to proper height for the patient. , hip precautions, safety , and weight bearing status      Patient response to education:   Pt verbalized understanding Pt demonstrated skill Pt requires further education in this area   Yes Partial Yes      Treatment:  Patient practiced and was instructed/facilitated in the following treatment: Patient Sat edge of bed 15 minutes with Supervision  to increase dynamic sitting balance and activity tolerance. Pt performed bed mobility, transfers, ambulation in room, seated exercises. Therapeutic Exercises:  ankle pumps, heel raises, glut sets, long arc quad, and seated marching  x 10-15 reps. At end of session, patient in chair with family/friend present call light and phone within reach,  all lines and tubes intact, nursing notified.       Patient would benefit from continued skilled Physical Therapy to improve functional independence and quality of life. Patient's/ family goals   rehab    Time in  1534  Time out  1639    Total Treatment Time  45 minutes    Evaluation time includes thorough review of current medical information, gathering information on past medical history/social history and prior level of function, completion of standardized testing/informal observation of tasks, assessment of data, and development of Plan of care and goals.      CPT codes:  Low Complexity PT evaluation (83133)  Therapeutic activities (24395)   15 minutes  1 unit(s)  Therapeutic exercises (34270)   15 minutes  1 unit(s)  Gait Training (14536) 15 minutes 1 unit(s)    Jerry Chaney, PT

## 2022-08-14 PROBLEM — S72.009A HIP FRACTURE (HCC): Status: ACTIVE | Noted: 2022-08-14

## 2022-08-14 LAB
ALBUMIN SERPL-MCNC: 3 G/DL (ref 3.5–5.2)
ALP BLD-CCNC: 68 U/L (ref 40–129)
ALT SERPL-CCNC: 11 U/L (ref 0–40)
ANION GAP SERPL CALCULATED.3IONS-SCNC: 6 MMOL/L (ref 7–16)
ANISOCYTOSIS: ABNORMAL
AST SERPL-CCNC: 32 U/L (ref 0–39)
BASOPHILS ABSOLUTE: 0 E9/L (ref 0–0.2)
BASOPHILS RELATIVE PERCENT: 0 % (ref 0–2)
BILIRUB SERPL-MCNC: <0.2 MG/DL (ref 0–1.2)
BUN BLDV-MCNC: 29 MG/DL (ref 6–23)
BURR CELLS: ABNORMAL
CALCIUM SERPL-MCNC: 8.9 MG/DL (ref 8.6–10.2)
CHLORIDE BLD-SCNC: 106 MMOL/L (ref 98–107)
CO2: 23 MMOL/L (ref 22–29)
CREAT SERPL-MCNC: 1.3 MG/DL (ref 0.7–1.2)
EOSINOPHILS ABSOLUTE: 0 E9/L (ref 0.05–0.5)
EOSINOPHILS RELATIVE PERCENT: 0 % (ref 0–6)
GFR AFRICAN AMERICAN: >60
GFR NON-AFRICAN AMERICAN: 55 ML/MIN/1.73
GLUCOSE BLD-MCNC: 232 MG/DL (ref 74–99)
HBA1C MFR BLD: 5.1 % (ref 4–5.6)
HCT VFR BLD CALC: 26.1 % (ref 37–54)
HEMOGLOBIN: 8.4 G/DL (ref 12.5–16.5)
LYMPHOCYTES ABSOLUTE: 0.48 E9/L (ref 1.5–4)
LYMPHOCYTES RELATIVE PERCENT: 4.3 % (ref 20–42)
MCH RBC QN AUTO: 32.7 PG (ref 26–35)
MCHC RBC AUTO-ENTMCNC: 32.2 % (ref 32–34.5)
MCV RBC AUTO: 101.6 FL (ref 80–99.9)
METER GLUCOSE: 139 MG/DL (ref 74–99)
METER GLUCOSE: 163 MG/DL (ref 74–99)
METER GLUCOSE: 169 MG/DL (ref 74–99)
MONOCYTES ABSOLUTE: 0.48 E9/L (ref 0.1–0.95)
MONOCYTES RELATIVE PERCENT: 4.3 % (ref 2–12)
NEUTROPHILS ABSOLUTE: 10.83 E9/L (ref 1.8–7.3)
NEUTROPHILS RELATIVE PERCENT: 91.3 % (ref 43–80)
OVALOCYTES: ABNORMAL
PDW BLD-RTO: 13.3 FL (ref 11.5–15)
PLATELET # BLD: 239 E9/L (ref 130–450)
PMV BLD AUTO: 9.8 FL (ref 7–12)
POIKILOCYTES: ABNORMAL
POTASSIUM SERPL-SCNC: 4.9 MMOL/L (ref 3.5–5)
RBC # BLD: 2.57 E12/L (ref 3.8–5.8)
SODIUM BLD-SCNC: 135 MMOL/L (ref 132–146)
TOTAL PROTEIN: 5.7 G/DL (ref 6.4–8.3)
WBC # BLD: 11.9 E9/L (ref 4.5–11.5)

## 2022-08-14 PROCEDURE — 83036 HEMOGLOBIN GLYCOSYLATED A1C: CPT

## 2022-08-14 PROCEDURE — 82962 GLUCOSE BLOOD TEST: CPT

## 2022-08-14 PROCEDURE — 97165 OT EVAL LOW COMPLEX 30 MIN: CPT | Performed by: OCCUPATIONAL THERAPIST

## 2022-08-14 PROCEDURE — 80053 COMPREHEN METABOLIC PANEL: CPT

## 2022-08-14 PROCEDURE — 97116 GAIT TRAINING THERAPY: CPT

## 2022-08-14 PROCEDURE — 97530 THERAPEUTIC ACTIVITIES: CPT

## 2022-08-14 PROCEDURE — 97530 THERAPEUTIC ACTIVITIES: CPT | Performed by: OCCUPATIONAL THERAPIST

## 2022-08-14 PROCEDURE — 6370000000 HC RX 637 (ALT 250 FOR IP): Performed by: INTERNAL MEDICINE

## 2022-08-14 PROCEDURE — 85025 COMPLETE CBC W/AUTO DIFF WBC: CPT

## 2022-08-14 PROCEDURE — 94640 AIRWAY INHALATION TREATMENT: CPT

## 2022-08-14 PROCEDURE — 6360000002 HC RX W HCPCS: Performed by: NURSE PRACTITIONER

## 2022-08-14 PROCEDURE — 36415 COLL VENOUS BLD VENIPUNCTURE: CPT

## 2022-08-14 PROCEDURE — 97110 THERAPEUTIC EXERCISES: CPT

## 2022-08-14 PROCEDURE — 2580000003 HC RX 258: Performed by: FAMILY MEDICINE

## 2022-08-14 PROCEDURE — 1200000000 HC SEMI PRIVATE

## 2022-08-14 RX ORDER — INSULIN LISPRO 100 [IU]/ML
0-4 INJECTION, SOLUTION INTRAVENOUS; SUBCUTANEOUS NIGHTLY
Status: DISCONTINUED | OUTPATIENT
Start: 2022-08-14 | End: 2022-08-17 | Stop reason: HOSPADM

## 2022-08-14 RX ORDER — INSULIN LISPRO 100 [IU]/ML
0-4 INJECTION, SOLUTION INTRAVENOUS; SUBCUTANEOUS
Status: DISCONTINUED | OUTPATIENT
Start: 2022-08-14 | End: 2022-08-17 | Stop reason: HOSPADM

## 2022-08-14 RX ADMIN — ARFORMOTEROL TARTRATE 15 MCG: 15 SOLUTION RESPIRATORY (INHALATION) at 17:26

## 2022-08-14 RX ADMIN — SODIUM CHLORIDE, PRESERVATIVE FREE 10 ML: 5 INJECTION INTRAVENOUS at 20:55

## 2022-08-14 RX ADMIN — IPRATROPIUM BROMIDE AND ALBUTEROL SULFATE 1 AMPULE: .5; 2.5 SOLUTION RESPIRATORY (INHALATION) at 13:55

## 2022-08-14 RX ADMIN — OXYCODONE 10 MG: 5 TABLET ORAL at 01:47

## 2022-08-14 RX ADMIN — ARFORMOTEROL TARTRATE 15 MCG: 15 SOLUTION RESPIRATORY (INHALATION) at 05:27

## 2022-08-14 RX ADMIN — OXYCODONE 5 MG: 5 TABLET ORAL at 17:21

## 2022-08-14 RX ADMIN — OXYCODONE 10 MG: 5 TABLET ORAL at 21:07

## 2022-08-14 RX ADMIN — IPRATROPIUM BROMIDE AND ALBUTEROL SULFATE 1 AMPULE: .5; 2.5 SOLUTION RESPIRATORY (INHALATION) at 17:26

## 2022-08-14 RX ADMIN — BUDESONIDE 500 MCG: 0.5 SUSPENSION RESPIRATORY (INHALATION) at 17:26

## 2022-08-14 RX ADMIN — IPRATROPIUM BROMIDE AND ALBUTEROL SULFATE 1 AMPULE: .5; 2.5 SOLUTION RESPIRATORY (INHALATION) at 05:27

## 2022-08-14 RX ADMIN — PANTOPRAZOLE SODIUM 40 MG: 40 TABLET, DELAYED RELEASE ORAL at 06:48

## 2022-08-14 RX ADMIN — IPRATROPIUM BROMIDE AND ALBUTEROL SULFATE 1 AMPULE: .5; 2.5 SOLUTION RESPIRATORY (INHALATION) at 09:52

## 2022-08-14 RX ADMIN — HYDROXYCHLOROQUINE SULFATE 200 MG: 200 TABLET, FILM COATED ORAL at 09:39

## 2022-08-14 RX ADMIN — OXYCODONE 10 MG: 5 TABLET ORAL at 06:50

## 2022-08-14 RX ADMIN — BUDESONIDE 500 MCG: 0.5 SUSPENSION RESPIRATORY (INHALATION) at 05:27

## 2022-08-14 ASSESSMENT — PAIN SCALES - GENERAL
PAINLEVEL_OUTOF10: 7
PAINLEVEL_OUTOF10: 7
PAINLEVEL_OUTOF10: 4
PAINLEVEL_OUTOF10: 7

## 2022-08-14 ASSESSMENT — PAIN DESCRIPTION - DESCRIPTORS
DESCRIPTORS: ACHING;SHARP
DESCRIPTORS: ACHING
DESCRIPTORS: ACHING

## 2022-08-14 ASSESSMENT — PAIN DESCRIPTION - ORIENTATION
ORIENTATION: RIGHT

## 2022-08-14 ASSESSMENT — PAIN DESCRIPTION - LOCATION
LOCATION: HIP

## 2022-08-14 NOTE — PROGRESS NOTES
Internal Medicine Progress Note    TO=[facility]    Berto Carrillo. Rema Moran, TONJAOLalitaILalita Kimball D.O., AMY Jefferson, MSN, APRN, NP-C  Alethea Marroquin. Clement Mccracken, MSN, APRN-CNP     Primary Care Physician: Niranjan Bruner DO   Admitting Physician:  Ciro Bloom DO  Admission date and time: 8/11/2022  5:41 PM    Room:  [address] [place] [address]  Admitting diagnosis: Closed right hip fracture, initial encounter [facility]) [S72.001A]  Closed hip fracture, right, initial encounter [facility]) [address] [place] [address]    Patient Name: Mk Dubois  MRN: [id number]    Date of Service: 8/14/2022     Subjective: Selam Church is a 81 y.o. male who was seen and examined today,8/14/2022, at the bedside. Selam Church tolerated surgery well and did very well with therapy following. We have encouraged him to increase activity today and he is eager for discharge home. He does have 3-4 stairs and he will need to ascend and descend to get in and out of his home and he will work with stairs in therapy as well. No family present. Review of System:   Constitutional:   Denies fever or chills, weight loss or gain, fatigue or malaise. HEENT:   Denies ear pain, sore throat, sinus or eye problems. Cardiovascular:   Denies any chest pain, irregular heartbeats, or palpitations. Respiratory:   Denies shortness of breath, coughing, sputum production, hemoptysis, or wheezing. Gastrointestinal:   Denies nausea, vomiting, diarrhea, or constipation. Denies any abdominal pain. Genitourinary:    Denies any urgency, frequency, hematuria. Voiding  without difficulty. Extremities:   Postoperative pain as to be expected. Neurology:    Denies any headache or focal neurological deficits, Denies generalized weakness or memory difficulty. Psch:   Denies being anxious or depressed. Musculoskeletal:    Denies  myalgias, joint complaints or back pain.    Integumentary:   Denies any rashes, ulcers, or excoriations. Denies bruising. Hematologic/Lymphatic:  Denies bruising or bleeding. Physical Exam:  I/O this shift:  In: -   Out: 600 [Urine:600]    Intake/Output Summary (Last 24 hours) at 8/14/2022 0656  Last data filed at 8/14/2022 0544  Gross per 24 hour   Intake 2970.79 ml   Output 1800 ml   Net 1170.79 ml     I/O last 3 completed shifts: In: 3230.8 [P.O.:480; I.V.:2710.6; IV Piggyback:40.2]  Out: 3350 [Urine:2950; Blood:400]  Patient Vitals for the past 96 hrs (Last 3 readings):   Weight   08/11/22 2004 175 lb (79.4 kg)   08/11/22 1742 175 lb (79.4 kg)       Vital Signs:   Blood pressure (!) 117/58, pulse 87, temperature 97.7 °F (36.5 °C), temperature source Axillary, resp. rate 16, height 5' 8\" (1.727 m), weight 175 lb (79.4 kg), SpO2 100 %. General appearance:  Alert, responsive, oriented to person, place, and time. Comfortable, no distress. Head:  Normocephalic. No masses, lesions or tenderness. Eyes:  PERRLA. EOMI. Sclera clear. Buccal mucosa moist.  ENT:  Ears normal. Mucosa normal.  Neck:    Supple. Trachea midline. No thyromegaly. No JVD. No bruits. Heart:    Rhythm regular. Rate controlled. No murmurs. Lungs:    Symmetrical.  Mildly diminished air exchange, otherwise lungs are clear to auscultation bilaterally. Abdomen:   Soft. Non-tender. Non-distended. Bowel sounds positive. No organomegaly or masses. No pain on palpation. Extremities:    Peripheral pulses present. No significant pitting peripheral edema. No ulcers. No cyanosis. No clubbing. Neurologic:    Alert x 3. No focal deficit. Cranial nerves grossly intact. No focal weakness. Psych:   Behavior is normal. Mood appears normal. Speech is not rapid and/or pressured. Musculoskeletal:   Unremarkable postoperative appearance of the right hip, compartments are soft and nontender and distal neurovascular examination is intact. Gait not assessed.   Integumentary:  No rashes  Skin normal color and texture.   Genitalia/Breast:  Deferred    Medication:  Scheduled Meds:   insulin lispro  0-4 Units SubCUTAneous TID WC    insulin lispro  0-4 Units SubCUTAneous Nightly    sodium chloride flush  5-40 mL IntraVENous 2 times per day    [Held by provider] aspirin  81 mg Oral Daily    [Held by provider] allopurinol  100 mg Oral Daily    [Held by provider] lisinopril  30 mg Oral Daily    budesonide  0.5 mg Nebulization BID    Arformoterol Tartrate  15 mcg Nebulization BID    ipratropium-albuterol  1 ampule Inhalation Q4H WA    sodium chloride flush  5-40 mL IntraVENous 2 times per day    hydroxychloroquine  200 mg Oral Daily    pantoprazole  40 mg Oral QAM AC     Continuous Infusions:   sodium chloride      dextrose      sodium chloride         Objective Data:  CBC with Differential:    Lab Results   Component Value Date/Time    WBC 11.9 08/14/2022 04:33 AM    RBC 2.57 08/14/2022 04:33 AM    HGB 8.4 08/14/2022 04:33 AM    HCT 26.1 08/14/2022 04:33 AM     08/14/2022 04:33 AM    .6 08/14/2022 04:33 AM    MCH 32.7 08/14/2022 04:33 AM    MCHC 32.2 08/14/2022 04:33 AM    RDW 13.3 08/14/2022 04:33 AM    LYMPHOPCT 4.3 08/14/2022 04:33 AM    MONOPCT 4.3 08/14/2022 04:33 AM    BASOPCT 0.0 08/14/2022 04:33 AM    MONOSABS 0.48 08/14/2022 04:33 AM    LYMPHSABS 0.48 08/14/2022 04:33 AM    EOSABS 0.00 08/14/2022 04:33 AM    BASOSABS 0.00 08/14/2022 04:33 AM     BMP:    Lab Results   Component Value Date/Time     08/14/2022 04:33 AM    K 4.9 08/14/2022 04:33 AM    K 4.6 08/11/2022 07:11 PM     08/14/2022 04:33 AM    CO2 23 08/14/2022 04:33 AM    BUN 29 08/14/2022 04:33 AM    LABALBU 3.0 08/14/2022 04:33 AM    CREATININE 1.3 08/14/2022 04:33 AM    CALCIUM 8.9 08/14/2022 04:33 AM    GFRAA >60 08/14/2022 04:33 AM    LABGLOM 55 08/14/2022 04:33 AM    GLUCOSE 232 08/14/2022 04:33 AM       Assessment:  Closed right basicervical femoral neck fracture  Postoperative anemia   CAYETANO on chronic kidney disease stage IIIa,

## 2022-08-14 NOTE — PROGRESS NOTES
Department of Orthopedic Surgery  Resident Progress Note    Patient seen and examined. Pain controlled. No new complaints. Ambulated well with PT. Denies chest pain, shortness of breath, dizziness/lightheadedness.      VITALS:  /64   Pulse 84   Temp 98.4 °F (36.9 °C) (Oral)   Resp 18   Ht 5' 8\" (1.727 m)   Wt 175 lb (79.4 kg)   SpO2 100%   BMI 26.61 kg/m²     General: alert and oriented to person, place and time, well-developed and well-nourished, in no acute distress    MUSCULOSKELETAL:   right lower extremity:  Dressing C/D/I  Compartments soft and compressible  +PF/DF/EHL  +2/4 DP & PT pulses, Brisk Cap refill, Toes warm and perfused  Distal sensation grossly intact to Peroneals, Sural, Saphenous, and tibial nrs    CBC:   Lab Results   Component Value Date/Time    WBC 11.9 08/14/2022 04:33 AM    HGB 8.4 08/14/2022 04:33 AM    HCT 26.1 08/14/2022 04:33 AM     08/14/2022 04:33 AM     PT/INR:    Lab Results   Component Value Date/Time    PROTIME 10.9 08/11/2022 07:11 PM    INR 1.0 08/11/2022 07:11 PM       ASSESSMENT  S/P R [facility]HA - 8/13/22    PLAN      Continue physical therapy and protocol: WBAT - RLE  24 hour abx coverage - complete  Deep venous thrombosis prophylaxis - ASA 81, early mobilization  PT/OT  Pain Control: IV and PO  Monitor H&H  D/C Plan:  Possibly home tomorrow

## 2022-08-14 NOTE — PLAN OF CARE
Problem: Discharge Planning  Goal: Discharge to home or other facility with appropriate resources  Outcome: Progressing     Problem: Pain  Goal: Verbalizes/displays adequate comfort level or baseline comfort level  Outcome: Progressing     Problem: Skin/Tissue Integrity  Goal: Absence of new skin breakdown  Description: 1. Monitor for areas of redness and/or skin breakdown  2. Assess vascular access sites hourly  3. Every 4-6 hours minimum:  Change oxygen saturation probe site  4. Every 4-6 hours:  If on nasal continuous positive airway pressure, respiratory therapy assess nares and determine need for appliance change or resting period.   Outcome: Progressing     Problem: ABCDS Injury Assessment  Goal: Absence of physical injury  Outcome: Progressing     Problem: Safety - Adult  Goal: Free from fall injury  Outcome: Progressing     Problem: Skin/Tissue Integrity - Adult  Goal: Incisions, wounds, or drain sites healing without S/S of infection  Outcome: Adequate for Discharge     Problem: Musculoskeletal - Adult  Goal: Return mobility to safest level of function  Outcome: Adequate for Discharge     Problem: Gastrointestinal - Adult  Goal: Maintains or returns to baseline bowel function  Outcome: Progressing     Problem: Infection - Adult  Goal: Absence of infection at discharge  Outcome: Progressing     Problem: Metabolic/Fluid and Electrolytes - Adult  Goal: Glucose maintained within prescribed range  Outcome: Adequate for Discharge

## 2022-08-14 NOTE — PROGRESS NOTES
Physical Therapy  Physical Therapy Treatment Note/Plan of Care    Room #:  0315/0315-01  Patient Name: Olvin Crespo  YOB: 1950  MRN: [id number]    Date of Service: 8/14/2022     Tentative placement recommendation: Home Health Physical Therapy , Subacute rehab, or Acute rehab  Equipment recommendation: To be determined and Woody Singh      Evaluating Physical Therapist: Damion Urbano PT, [place] #[id number]      Specific Provider Orders/Date/Referring Provider :     08/13/22 1215    PT eval and treat  Start:  08/13/22 1215,   End:  08/13/22 1215,   ONE TIME,   Standing Count:  1 Occurrences,   R         Nimo Alvarez,  Acknowledge New     Admitting Diagnosis:   Closed right hip fracture, initial encounter [facility]) [S72.001A]  Closed hip fracture, right, initial encounter [facility]) [S72.001A]      Surgery: R hip hemiarthroplasty on 8/13/22  Visit Diagnoses         Codes    S/P right hip fracture     Z87.81            Patient Active Problem List   Diagnosis    Closed right hip fracture, initial encounter [facility])    Hip fracture ([facility] [address]        ASSESSMENT of Current Deficits Patient exhibits decreased strength, balance, and endurance impairing functional mobility, transfers, gait , gait distance, and tolerance to activity. Patient tolerates inc in ambulation distance while progressing to step through gait pattern vs step to with wheeled walker. Patient also completes stair training on 4'' step with 2 HR and Min assist. Patient required seated rest between ambulation due to fatigue and shortness of breath. Patient able to recover with cues for pursed lip breathing. Patient aware of hip precautions. Patient would benefit from continued therapy for strengthening and endurance for safety to decrease risk of falls.        PHYSICAL THERAPY  PLAN OF CARE       Physical therapy plan of care is established based on physician order,  patient diagnosis and clinical assessment    Current Treatment Recommendations:    -Bed Mobility: Lower extremity exercises  and Trunk control activities   -Sitting Balance: Incorporate reaching activities to activate trunk muscles , Hands on support to maintain midline , Facilitate active trunk muscle engagement , Facilitate postural control in all planes , and Engage in core activities to allow for movement within base of support   -Standing Balance: Perform strengthening exercises in standing to promote motor control with or without upper extremity support , Instruct patient on adequate base of support to maintain balance, and Challenge balance utilizing reaching  activities beyond center of gravity    -Transfers: Provide instruction on proper hand and foot position for adequate transfer of weight onto lower extremities and use of gait device if needed, Cues for hand placement, technique and safety. Provide stabilization to prevent fall , Facilitate weight shift forward on to lower extremities and provide necessary stabilization of bilateral lower extremities , Support transfer of weight on to lower extremities, and Assist with extension of knees trunk and hip to accept weight transfer   -Gait: Gait training, Standing activities to improve: base of support, weight shift, weight bearing , Exercises to improve trunk control, Exercises to improve hip and knee control, Performance of protected weight bearing activities, and Activities to increase weight bearing   -Endurance: Utilize Supervised activities to increase level of endurance to allow for safe functional mobility including transfers and gait  and Use graduated activities to promote good breathing techniques and provide support and education to maximize respiratory function  -Stairs: Stair training with instruction on proper technique and hand placement on rail    PT long term treatment goals are located in below grid    Patient and or family understand(s) diagnosis, prognosis, and plan of care.     Frequency of treatments: Patient will be seen twice daily. Prior Level of Function: Patient ambulated independently   Rehab Potential: good  for baseline    Past medical history:   Past Medical History:   Diagnosis Date    Diverticulitis     Gout     Hypertension     Tobacco abuse      Past Surgical History:   Procedure Laterality Date    APPENDECTOMY      CATARACT REMOVAL WITH IMPLANT Right 2 2 15    CATARACT REMOVAL WITH IMPLANT Left 4/6/15    COLONOSCOPY  7 years ago    Dr Carl Shaw      child       SUBJECTIVE:    Precautions: Up with assistance, falls, full weight bearing RLE, and hip precautions      Social history: Patient lives alone in a ranch home  with 3 steps  to enter with Rail  Tub shower     Equipment owned: Marialuisa Johnson,      [address] [place], [place] [address]   How much difficulty turning over in bed?: A Little  How much difficulty sitting down on / standing up from a chair with arms?: A Lot  How much difficulty moving from lying on back to sitting on side of bed?: A Little  How much help from another person moving to and from a bed to a chair?: A Little  How much help from another person needed to walk in hospital room?: A Little  How much help from another person for climbing 3-5 steps with a railing?: A Little  AM-[facility] Inpatient Mobility Raw Score : 17  AM-[facility] Inpatient T-Scale Score : 42.13  Mobility Inpatient CMS 0-100% Score: 50.57  Mobility Inpatient CMS G-Code Modifier : CK    Nursing cleared patient for PT treatment. Patient sitting in bedside chair at start of session. OBJECTIVE;   Initial Evaluation  Date: 8/13/2022 Treatment Date:  8/14/2022     Short Term/ Long Term   Goals   Was pt agreeable to Eval/treatment?  Yes yes To be met in 2 days   Pain level   6/10  R hip 5/10 R hip with activity    Bed Mobility    Rolling: Supervision     Supine to sit: Minimal assist of 1    Sit to supine: Minimal assist of 1    Scooting: Supervision    Rolling: Not assessed patient in chair   Supine to sit: Not assessed patient in chair   Sit to supine: Supervision    Scooting: Supervision     Rolling: Independent    Supine to sit: Independent    Sit to supine: Independent    Scooting: Independent     Transfers Sit to stand:  Min-ModA   Sit to stand: Moderate assist of 1 cues for hand placement.  Patient progressed to [facility] for next standing rep   Sit to stand: Modified Independent    Ambulation     25 feet using  wheeled walker with Minimal assist of 1   for walker control, balance, upright, and safety 2x65 feet using  wheeled walker with Minimal assist of 1   progressing to supervision, cues for sequencing, upright posture, walker approximation, increased step length, right knee extension in stance phase of gait, safety, pacing, and pursed lip breathing     > 75 feet using  wheeled walker with Modified Independent    Stair negotiation: ascended and descended   Not assessed  4 steps ascend/descend with 2 HR and Min assist, cues for proper sequencing, pacing and safety     3 steps with 1 HR with Mod I   ROM Within functional limits    Increase range of motion 10% of affected joints    Strength BUE:  refer to OT eval  RLE:  3+/5  LLE:  4+/5  Increase strength in affected mm groups by 1/3 grade   Balance Sitting EOB:  fair +  Dynamic Standing:  fair  Sitting EOB: fair +  Dynamic Standing: fair with wheeled walker   Sitting EOB:  good   Dynamic Standing: fair +     Patient is Alert & Oriented x person, place, time, and situation and follows directions    Sensation:  Patient  denies numbness/tingling   Edema:  no   Endurance: fair     Vitals: room air   Blood Pressure at rest  Blood Pressure during session    Heart Rate at rest  Heart Rate during session    SPO2 at rest %  SPO2 during session 88-95%     Patient education  Patient educated on role of Physical Therapy, risks of immobility, safety and plan of care, energy conservation,  importance of mobility while in hospital , purse lip breathing, hip precautions, safety , stair training , weight bearing status , and seated exercises      Patient response to education:   Pt verbalized understanding Pt demonstrated skill Pt requires further education in this area   Yes Partial Yes      Treatment:  Patient practiced and was instructed/facilitated in the following treatment: Patient performed seated exercises in chair. Patient stood, weight shifted, and amb into hallway. Patient required seated rest break and provided with education on stair training. Patient stood to amb to stairs and performed steps as above. Patient amb back to room progressing to step through gait pattern. Patient requested back to bed, provided with ice for R hip. Therapeutic Exercises:  ankle pumps, long arc quad, and seated marching  x 10-15 reps. At end of session, patient in chair with family/friend present call light and phone within reach,  all lines and tubes intact, nursing notified. Patient would benefit from continued skilled Physical Therapy to improve functional independence and quality of life.          Patient's/ family goals   rehab    Time in  36  Time out  927    Total Treatment Time  40 minutes    CPT codes:  Therapeutic activities (30538)   15 minutes  1 unit(s)  Therapeutic exercises (41681)   10 minutes  1 unit(s)  Gait Training (11574) 15 minutes 1 unit(s)    Sharon Cotton, [facility]   #[id number]

## 2022-08-14 NOTE — PROGRESS NOTES
Occupational Therapy  OCCUPATIONAL THERAPY INITIAL EVALUATION     Nay Savage pushd [facility]  [facility] [place]         [place]                                                   Patient Name: Walter Mclean     MRN: [id number]     : 1950     Room: [address] [place] [address]       Evaluating OT: Bryant Benito, OTR/L; [id number]       Referring Provider and Orders/Date:   OT eval and treat  Start:  22,   End:  22 121,   ONE TIME,   Standing Count:  1 Occurrences,   R       Comments:  Posterior hip precautions    Victoriano Mcgraw DO        Diagnosis:   1. Closed right hip fracture, initial encounter ([facility] [address]    2.  S/P right hip fracture         Surgery: R hip hemiarthroplasty on 22      Pertinent Medical History:        Past Medical History:   Diagnosis Date    Diverticulitis     Gout     Hypertension     Tobacco abuse           Past Surgical History:   Procedure Laterality Date    APPENDECTOMY      CATARACT REMOVAL WITH IMPLANT Right 2  15    CATARACT REMOVAL WITH IMPLANT Left 4/6/15    COLONOSCOPY  7 years ago    Dr Ana Alva      child       Precautions:  Fall Risk, FWB R LE, hip precautions    Recommended placement: home with HH    Assessment of current deficits     [x] Functional mobility  [x]ADLs  [x] Strength               [x]Cognition     [x] Functional transfers   [x] IADLs         [x] Safety Awareness   [x]Endurance     [] Fine Coordination              [x] Balance      [] Vision/perception   []Sensation      [x]Gross Motor Coordination  [] ROM  [] Delirium                   [] Motor Control     OT PLAN OF CARE   OT POC based on physician orders, patient diagnosis and results of clinical assessment    Frequency/Duration 1-3 days/wk for 2 weeks PRN   Specific OT Treatment Interventions to include:   * Instruction/training on adapted ADL techniques and AE recommendations to increase functional independence within precautions       * Training on energy conservation strategies, correct breathing pattern and techniques to improve independence/tolerance for self-care routine  * Functional transfer/mobility training/DME recommendations for increased independence, safety, and fall prevention  * Patient/Family education to increase follow through with safety techniques and functional independence  * Recommendation of environmental modifications for increased safety with functional transfers/mobility and ADLs  * Cognitive retraining/development of therapeutic activities to improve problem solving, judgement, memory, and attention for increased safety/participation in ADL/IADL tasks  * Therapeutic exercise to improve motor endurance, ROM, and functional strength for ADLs/functional transfers  * Therapeutic activities to facilitate/challenge dynamic balance, stand tolerance for increased safety and independence with ADLs  * Therapeutic activities to facilitate gross/fine motor skills for increased independence with ADLs  * Neuro-muscular re-education: facilitation of righting/equilibrium reactions, midline orientation, scapular stability/mobility, normalization of muscle tone, and facilitation of volitional active controled movement  * Positioning to improve skin integrity, interaction with environment and functional independence     Recommended Adaptive Equipment/DME: AE; TTB; TBD      Home Living: Pt lives alone in a ranch home with 3 steps to enter with rails. Can have family assist at [place]. Laundry in the basement, standard flight of steps with rails.      Bathroom setup: tub shower; standard toilet   DME owned: cane      Prior Level of Function: indep with ADLs , indep with IADLs; ambulated indep   Driving: yes   Occupation: retired    Enjoys: golfing, [employer] for a few beers everyday, napping    Pain Level: none  Cognition: A&O: 4/4; Follows 3 step directions   Memory:  fair with carry over from PT the previous day   Sequencing: fair   Problem solving:  fair- with anxiety    Judgement/safety:  fair- with anxiety    AM-PAC Daily Activity Inpatient   How much help for putting on and taking off regular lower body clothing?: A Lot  How much help for Bathing?: A Lot  How much help for Toileting?: A Little  How much help for putting on and taking off regular upper body clothing?: A Little  How much help for taking care of personal grooming?: A Little  How much help for eating meals?: None  AM-PAC Inpatient Daily Activity Raw Score: 17  AM-PAC Inpatient ADL T-Scale Score : 37.26  ADL Inpatient CMS 0-100% Score: 50.11  ADL Inpatient CMS G-Code Modifier : CK       Functional Assessment:     Initial Eval Status  Date: 8/14/2022   Treatment Status  Date: STGs = LTGs  Time frame: 10-14 days   Feeding Independent   NA-PLOF   Grooming Stand by Assist at the sink for oral care, hand/face wash and hair comb  Independent    UB Dressing Minimal Assist with gown management  Independent    LB Dressing Moderate Assist with right sock and pants. Needs AE, but too confused and overwhelmed with trial this AM. To focused on bathroom and PT session. Modified [place]    Bathing Moderate Assist for LB management and balance/safety in sitting/standing  Modified [place]    Toileting Minimal Assist for safety and clothing management in standing  Modified [place]    Bed Mobility  Supine to sit: min A    Sit to supine: min A with impulsivity and anxiety     Supine to sit: Modified [place]   Sit to supine: Modified [place]    Functional Transfers Minimal Assist with confusion and anxiety with hand/feet placement and walker management. Completed from 3:1 and bed. Modified [place]    Functional Mobility Stand by Assist with walker for short distance functional mobility throughout the room to simulate house hold distances.     Modified [place]    Balance Sitting:     Static:  fair+    Dynamic:fair  Standing: fair-  Sitting:     Static: good    Dynamic:fair+  Standing: fair   Activity Tolerance Vitals with activity:WFL overall with room air; fair tolerance with anxiety and confusion throughout;   Standing tolerance 2min at sink  Increase standing tolerance for >4min with stable vital signs for carry over into toileting, functional tranfers and indep in ADLs   Visual/  Perceptual Glasses: Present; WFL    Reports change in vision since admission: No     NA     Hand Dominance  [x] Right  [] Left    AROM (PROM) Strength Additional Info:  Goal:   RUE  WFL 4/5 good  and  FMC/dexterity noted during ADL tasks  Opposition [x] Intact [] Impaired  Finger to nose [x] Intact [] Impaired 5/5MMT generally for carry over into self care, functional transfers and functional mobility with AD. LUE WFL 4/5 good  and  FMC/dexterity noted during ADL tasks  Opposition [x] Intact [] Impaired  Finger to nose [x] Intact [] Impaired 5/5MMT generally for carry over into self care, functional transfers and functional mobility with AD. Hearing: WFL   Sensation:  No c/o numbness or tingling   Tone: WFL   Edema: R LE    Comments: Upon arrival patient supine in bed with anxiety to get out of bed. Pt required min A for most UB ADLs and modA LB ADLs tasks. Limited with min A for standing during LB ADLs and functional transfers. The biggest barriers reflect that of functional transfers, functional mobility, UB/LB ADLs, cognition, activity tolerance, balance, safety and strengthening. At end of session, patient sitting up in arm chair with call light and phone within reach, all lines and tubes intact. Overall patient demonstrated decreased independence and safety during completion of ADL/functional transfer/mobility tasks compared to PLOF.  Nursing updated on pt position and status following OT eval. Pt would benefit from continued skilled OT to increase safety and independence with completion of ADL/IADL tasks for functional independence and quality of life.    Treatment: OT treatment provided this date includes:  Instruction, education and training on safe facilitation and adapted techniques for completion of ADLs. These include neuromuscular reeducation to facilitate balance/righting reactions,safe functional transfer techniques, proper positioning/alignment to improve interaction with environment and overall function and on adapted techniques/work simplification for completion of ADLs. Education provided on hand/feet placement with bed rails, hip precautions, 3:1, walker and body mechanics for fall prevention. Cues for energy conservation and safety for in the home at [place], including modifications and DME. Overall anxiety and confusion with LB AE with training on precautions. Pt seemed overwhelmed generally. Extended time to complete all tasks, including skilled monitoring of patient's response during treatment session and vital signs. Prior to and at the end of session, environmental modifications / line management completed for patients safety and efficiency of treatment session. See above for further details. Rehab Potential: Good for established goals     Patient / Family Goal: Pain management      Patient and/or family were instructed on functional diagnosis, prognosis/goals and OT plan of care. Demonstrated fair understanding. Eval Complexity: Low  History: Brief review of medical records and additional review of physical, cognitive, or psychosocial history related to current functional performance  Exam: 3+ performance deficits  Assistance/Modification: Mod assistance or modifications required to perform tasks. May have comorbidities that affect occupational performance.     Time In: 0800  Time Out: 0835  Total Treatment Time: 15    Min Units   OT Eval Low 97165  x  1   OT Eval Medium 19500      OT Eval High 85973      OT Re-Eval [id number]       Therapeutic Ex 83835       Therapeutic Activities 15219  15 1    ADL/Self Care 33748       Orthotic Management 64552       Manual 70461     Neuro Re-Ed 92161       Non-Billable Time          Evaluation Time additionally includes thorough review of current medical information, gathering information on past medical history/social history and prior level of function, interpretation of standardized testing/informal observation of tasks, assessment of data and development of plan of care and goals.             Jovita Dose OTR/L; [id number]

## 2022-08-15 ENCOUNTER — APPOINTMENT (OUTPATIENT)
Dept: MRI IMAGING | Age: 72
DRG: 521 | End: 2022-08-15
Payer: MEDICARE

## 2022-08-15 PROBLEM — M21.372 LEFT FOOT DROP: Status: ACTIVE | Noted: 2022-08-15

## 2022-08-15 LAB
ANION GAP SERPL CALCULATED.3IONS-SCNC: 9 MMOL/L (ref 7–16)
BASOPHILS ABSOLUTE: 0.01 E9/L (ref 0–0.2)
BASOPHILS RELATIVE PERCENT: 0.1 % (ref 0–2)
BUN BLDV-MCNC: 27 MG/DL (ref 6–23)
C-REACTIVE PROTEIN: 8.9 MG/DL (ref 0–0.4)
CALCIUM SERPL-MCNC: 9.4 MG/DL (ref 8.6–10.2)
CHLORIDE BLD-SCNC: 103 MMOL/L (ref 98–107)
CO2: 25 MMOL/L (ref 22–29)
CREAT SERPL-MCNC: 1.3 MG/DL (ref 0.7–1.2)
EOSINOPHILS ABSOLUTE: 0.02 E9/L (ref 0.05–0.5)
EOSINOPHILS RELATIVE PERCENT: 0.2 % (ref 0–6)
GFR AFRICAN AMERICAN: >60
GFR NON-AFRICAN AMERICAN: 55 ML/MIN/1.73
GLUCOSE BLD-MCNC: 128 MG/DL (ref 74–99)
HCT VFR BLD CALC: 26.2 % (ref 37–54)
HEMOGLOBIN: 8.5 G/DL (ref 12.5–16.5)
IMMATURE GRANULOCYTES #: 0.08 E9/L
IMMATURE GRANULOCYTES %: 0.6 % (ref 0–5)
LYMPHOCYTES ABSOLUTE: 1.04 E9/L (ref 1.5–4)
LYMPHOCYTES RELATIVE PERCENT: 8.1 % (ref 20–42)
MCH RBC QN AUTO: 32.4 PG (ref 26–35)
MCHC RBC AUTO-ENTMCNC: 32.4 % (ref 32–34.5)
MCV RBC AUTO: 100 FL (ref 80–99.9)
METER GLUCOSE: 122 MG/DL (ref 74–99)
METER GLUCOSE: 84 MG/DL (ref 74–99)
METER GLUCOSE: 96 MG/DL (ref 74–99)
MONOCYTES ABSOLUTE: 1.16 E9/L (ref 0.1–0.95)
MONOCYTES RELATIVE PERCENT: 9.1 % (ref 2–12)
NEUTROPHILS ABSOLUTE: 10.46 E9/L (ref 1.8–7.3)
NEUTROPHILS RELATIVE PERCENT: 81.9 % (ref 43–80)
PDW BLD-RTO: 13.3 FL (ref 11.5–15)
PLATELET # BLD: 270 E9/L (ref 130–450)
PMV BLD AUTO: 9.8 FL (ref 7–12)
POTASSIUM SERPL-SCNC: 4.2 MMOL/L (ref 3.5–5)
RBC # BLD: 2.62 E12/L (ref 3.8–5.8)
SEDIMENTATION RATE, ERYTHROCYTE: 60 MM/HR (ref 0–15)
SODIUM BLD-SCNC: 137 MMOL/L (ref 132–146)
TOTAL CK: 513 U/L (ref 20–200)
WBC # BLD: 12.8 E9/L (ref 4.5–11.5)

## 2022-08-15 PROCEDURE — 86140 C-REACTIVE PROTEIN: CPT

## 2022-08-15 PROCEDURE — 70553 MRI BRAIN STEM W/O & W/DYE: CPT

## 2022-08-15 PROCEDURE — 80048 BASIC METABOLIC PNL TOTAL CA: CPT

## 2022-08-15 PROCEDURE — 6370000000 HC RX 637 (ALT 250 FOR IP): Performed by: INTERNAL MEDICINE

## 2022-08-15 PROCEDURE — 6360000002 HC RX W HCPCS: Performed by: NURSE PRACTITIONER

## 2022-08-15 PROCEDURE — 2580000003 HC RX 258: Performed by: INTERNAL MEDICINE

## 2022-08-15 PROCEDURE — 85651 RBC SED RATE NONAUTOMATED: CPT

## 2022-08-15 PROCEDURE — 94640 AIRWAY INHALATION TREATMENT: CPT

## 2022-08-15 PROCEDURE — 97110 THERAPEUTIC EXERCISES: CPT

## 2022-08-15 PROCEDURE — 6360000004 HC RX CONTRAST MEDICATION: Performed by: INTERNAL MEDICINE

## 2022-08-15 PROCEDURE — A9577 INJ MULTIHANCE: HCPCS | Performed by: RADIOLOGY

## 2022-08-15 PROCEDURE — 72148 MRI LUMBAR SPINE W/O DYE: CPT

## 2022-08-15 PROCEDURE — 85025 COMPLETE CBC W/AUTO DIFF WBC: CPT

## 2022-08-15 PROCEDURE — 6360000004 HC RX CONTRAST MEDICATION: Performed by: RADIOLOGY

## 2022-08-15 PROCEDURE — 36415 COLL VENOUS BLD VENIPUNCTURE: CPT

## 2022-08-15 PROCEDURE — 97535 SELF CARE MNGMENT TRAINING: CPT

## 2022-08-15 PROCEDURE — 82550 ASSAY OF CK (CPK): CPT

## 2022-08-15 PROCEDURE — 82962 GLUCOSE BLOOD TEST: CPT

## 2022-08-15 PROCEDURE — 1200000000 HC SEMI PRIVATE

## 2022-08-15 PROCEDURE — 93308 TTE F-UP OR LMTD: CPT

## 2022-08-15 RX ORDER — SODIUM CHLORIDE 9 MG/ML
INJECTION, SOLUTION INTRAVENOUS CONTINUOUS
Status: DISCONTINUED | OUTPATIENT
Start: 2022-08-15 | End: 2022-08-17 | Stop reason: HOSPADM

## 2022-08-15 RX ORDER — CLOPIDOGREL BISULFATE 75 MG/1
75 TABLET ORAL DAILY
Status: DISCONTINUED | OUTPATIENT
Start: 2022-08-15 | End: 2022-08-17 | Stop reason: HOSPADM

## 2022-08-15 RX ORDER — ATORVASTATIN CALCIUM 40 MG/1
40 TABLET, FILM COATED ORAL NIGHTLY
Status: DISCONTINUED | OUTPATIENT
Start: 2022-08-15 | End: 2022-08-17 | Stop reason: HOSPADM

## 2022-08-15 RX ADMIN — POLYETHYLENE GLYCOL 3350 17 G: 17 POWDER, FOR SOLUTION ORAL at 06:09

## 2022-08-15 RX ADMIN — BUDESONIDE 500 MCG: 0.5 SUSPENSION RESPIRATORY (INHALATION) at 06:20

## 2022-08-15 RX ADMIN — OXYCODONE 5 MG: 5 TABLET ORAL at 08:13

## 2022-08-15 RX ADMIN — HYDROXYCHLOROQUINE SULFATE 200 MG: 200 TABLET, FILM COATED ORAL at 08:11

## 2022-08-15 RX ADMIN — IPRATROPIUM BROMIDE AND ALBUTEROL SULFATE 1 AMPULE: .5; 2.5 SOLUTION RESPIRATORY (INHALATION) at 06:20

## 2022-08-15 RX ADMIN — ARFORMOTEROL TARTRATE 15 MCG: 15 SOLUTION RESPIRATORY (INHALATION) at 18:32

## 2022-08-15 RX ADMIN — OXYCODONE 10 MG: 5 TABLET ORAL at 15:02

## 2022-08-15 RX ADMIN — IPRATROPIUM BROMIDE AND ALBUTEROL SULFATE 1 AMPULE: .5; 2.5 SOLUTION RESPIRATORY (INHALATION) at 09:49

## 2022-08-15 RX ADMIN — BUDESONIDE 500 MCG: 0.5 SUSPENSION RESPIRATORY (INHALATION) at 18:32

## 2022-08-15 RX ADMIN — IPRATROPIUM BROMIDE AND ALBUTEROL SULFATE 1 AMPULE: .5; 2.5 SOLUTION RESPIRATORY (INHALATION) at 18:32

## 2022-08-15 RX ADMIN — GADOBENATE DIMEGLUMINE 17 ML: 529 INJECTION, SOLUTION INTRAVENOUS at 12:04

## 2022-08-15 RX ADMIN — PANTOPRAZOLE SODIUM 40 MG: 40 TABLET, DELAYED RELEASE ORAL at 06:09

## 2022-08-15 RX ADMIN — ATORVASTATIN CALCIUM 40 MG: 40 TABLET, FILM COATED ORAL at 20:13

## 2022-08-15 RX ADMIN — IPRATROPIUM BROMIDE AND ALBUTEROL SULFATE 1 AMPULE: .5; 2.5 SOLUTION RESPIRATORY (INHALATION) at 14:42

## 2022-08-15 RX ADMIN — ARFORMOTEROL TARTRATE 15 MCG: 15 SOLUTION RESPIRATORY (INHALATION) at 06:20

## 2022-08-15 RX ADMIN — ASPIRIN 81 MG CHEWABLE TABLET 81 MG: 81 TABLET CHEWABLE at 08:11

## 2022-08-15 RX ADMIN — PERFLUTREN 1.65 MG: 6.52 INJECTION, SUSPENSION INTRAVENOUS at 15:43

## 2022-08-15 RX ADMIN — SODIUM CHLORIDE, PRESERVATIVE FREE 10 ML: 5 INJECTION INTRAVENOUS at 08:15

## 2022-08-15 RX ADMIN — CLOPIDOGREL BISULFATE 75 MG: 75 TABLET ORAL at 14:00

## 2022-08-15 RX ADMIN — SODIUM CHLORIDE: 9 INJECTION, SOLUTION INTRAVENOUS at 10:02

## 2022-08-15 RX ADMIN — OXYCODONE 10 MG: 5 TABLET ORAL at 20:19

## 2022-08-15 ASSESSMENT — PAIN SCALES - GENERAL
PAINLEVEL_OUTOF10: 5
PAINLEVEL_OUTOF10: 7
PAINLEVEL_OUTOF10: 9

## 2022-08-15 ASSESSMENT — PAIN DESCRIPTION - DESCRIPTORS
DESCRIPTORS: ACHING;SORE
DESCRIPTORS: ACHING

## 2022-08-15 ASSESSMENT — PAIN DESCRIPTION - LOCATION
LOCATION: HIP;LEG
LOCATION: HIP;SHOULDER
LOCATION: HIP;LEG;SHOULDER

## 2022-08-15 ASSESSMENT — PAIN DESCRIPTION - ORIENTATION
ORIENTATION: RIGHT

## 2022-08-15 NOTE — PROGRESS NOTES
OT BEDSIDE TREATMENT NOTE      Date:8/15/2022  Patient Name: Jef Sow"  MRN: [id number]  : 1950  Room: [address] [place] [address]        Evaluating OT: Krista Reese OTR/NATHALY; [id number]        Referring Provider and Orders/Date:  OT eval and treat  Start:  22 1215,   End:  22 121,   ONE TIME,   Standing Count:  1 Occurrences,   R       Comments:  Posterior hip precautions   Lucero Bhardwaj DO        Diagnosis:   1. Closed right hip fracture, initial encounter ([facility] [address]   2.  S/P right hip fracture          Surgery: R hip hemiarthroplasty on 22      Pertinent Medical History:        Past Medical History        Past Medical History:   Diagnosis Date    Diverticulitis      Gout      Hypertension      Tobacco abuse               Past Surgical History         Past Surgical History:   Procedure Laterality Date    APPENDECTOMY        CATARACT REMOVAL WITH IMPLANT Right 2 2 15    CATARACT REMOVAL WITH IMPLANT Left 4/6/15    COLONOSCOPY   7 years ago     Dr Carl Shaw         child           Precautions:  Fall Risk, FWB R LE, hip precautions, L foot drop on this date    Recommended placement: home with [address] [place], [place] [address] with family supervision/assist vs. CEASAR pending pt progress in IP setting     Assessment of current deficits    [x] Functional mobility           [x]ADLs           [x] Strength                  [x]Cognition    [x] Functional transfers         [x] IADLs         [x] Safety Awareness   [x]Endurance    [] Fine Coordination                        [x] Balance      [] Vision/perception   []Sensation       [x]Gross Motor Coordination            [] ROM           [] Delirium                   [] Motor Control     OT PLAN OF CARE   OT POC based on physician orders, patient diagnosis and results of clinical assessment     Frequency/Duration 1-3 days/wk for 2 weeks PRN  Specific OT Treatment Interventions to include:   * Instruction/training on adapted ADL techniques and AE recommendations to increase functional independence within precautions       * Training on energy conservation strategies, correct breathing pattern and techniques to improve independence/tolerance for self-care routine  * Functional transfer/mobility training/DME recommendations for increased independence, safety, and fall prevention  * Patient/Family education to increase follow through with safety techniques and functional independence  * Recommendation of environmental modifications for increased safety with functional transfers/mobility and ADLs  * Cognitive retraining/development of therapeutic activities to improve problem solving, judgement, memory, and attention for increased safety/participation in ADL/IADL tasks  * Therapeutic exercise to improve motor endurance, ROM, and functional strength for ADLs/functional transfers  * Therapeutic activities to facilitate/challenge dynamic balance, stand tolerance for increased safety and independence with ADLs  * Therapeutic activities to facilitate gross/fine motor skills for increased independence with ADLs  * Neuro-muscular re-education: facilitation of righting/equilibrium reactions, midline orientation, scapular stability/mobility, normalization of muscle tone, and facilitation of volitional active controled movement  * Positioning to improve skin integrity, interaction with environment and functional independence      Recommended Adaptive Equipment/DME: hip kit/AE; TTB; TBD      Home Living: Pt lives alone in a ranch home with 3 steps to enter with rails. Can have family assist at [place]. Laundry in the basement, standard flight of steps with rails.      Bathroom setup: tub shower; standard toilet   DME owned: cane       Prior Level of Function: indep with ADLs , indep with IADLs; ambulated indep   Driving: yes   Occupation: retired    Enjoys: golfing, [employer] for a few beers everyday, napping     Pain Level: unquantified pain in R hip; pt c/o left foot drop and difficulty advancing LLE; nsg aware  Cognition: A&O: 4/4; Follows 3 step directions              Memory:  fair               Sequencing:  fair              Problem solving:  fair- with anxiety               Judgement/safety:  fair- with anxiety     AM-PAC Daily Activity Inpatient  How much help for putting on and taking off regular lower body clothing?: A Lot  How much help for Bathing?: A Lot  How much help for Toileting?: A Little  How much help for putting on and taking off regular upper body clothing?: A Little  How much help for taking care of personal grooming?: A Little  How much help for eating meals?: None  AM-PAC Inpatient Daily Activity Raw Score: 17  AM-PAC Inpatient ADL T-Scale Score : 37.26  ADL Inpatient CMS 0-100% Score: 50.11  ADL Inpatient CMS G-Code Modifier : CK                   Functional Assessment:      Initial Eval Status  Date: 8/14/2022   Treatment Status  Date:8/15/22 STGs = LTGs  Time frame: 10-14 days   Feeding Independent   Independent to bring cup to mouth when seated in chair NA-PLOF   Grooming Stand by Assist at the sink for oral care, hand/face wash and hair comb SBA for pt to comb hair when seated on BSC  Independent    UB Dressing Minimal Assist with gown management  Min A for gown management around shoulders and to tie/adjust back of gown Independent    LB Dressing Moderate Assist with right sock and pants. Needs AE, but too confused and overwhelmed with trial this AM. To focused on bathroom and PT session.   Mod A to adjust socks d/t hip precautions; pt overwhelmed with L foot drop ; continue to assess with use of AE for LE dressing  Modified [place]    Bathing Moderate Assist for LB management and balance/safety in sitting/standing N/T  Modified [place]    Toileting Minimal Assist for safety and clothing management in standing Min A for transfer to/from [facility] ; pt able to complete hygiene when seated on BS ; cuing to maintain hip precautions Modified [place]    Bed Mobility Supine to sit: min A    Sit to supine: min A with impulsivity and anxiety    Mod A for supine to sit with assist to guide UB to sitting and LLE to EOB d/t difficulty moving LLE (non sx LE); scooting at min A with cuing for hand placement; sit to supine N/T as pt seated in chair at end of session  Supine to sit: Modified [place]   Sit to supine: Modified [place]    Functional Transfers Minimal Assist with confusion and anxiety with hand/feet placement and walker management. Completed from 3:1 and bed. Mod A for sit to stand from EOB with height of bed elevated d/t weakness in LLE; transfer to/from chair , to/from [facility] and to chair with mod A with FWW; cuing for sequencing, upright position, safety Modified [place]    Functional Mobility Stand by Assist with walker for short distance functional mobility throughout the room to simulate house hold distances.     MIn A/Mod A with FWW d/t LLE weakness and L foot drop; pt able to transfer from EOB to chair ; chair to [facility]; BSC to chair and ~6 ft forward and backward; shakiness noted in LLE; nsg aware; weakness in LLE; R LE (sx LE)appears to be stronger   Modified [place]    Balance Sitting:    Static:  fair+    Dynamic:fair  Standing: fair- Sitting:    Static:  fair+    Dynamic:fair  Standing: fair/fair minus d/t LLE weakness  Sitting:    Static:  good    Dynamic:fair+  Standing: fair   Activity Tolerance Vitals with activity:WFL overall with room air; fair tolerance with anxiety and confusion throughout;  Standing tolerance 2min at sink Fair   Increase standing tolerance for >4min with stable vital signs for carry over into toileting, functional tranfers and indep in ADLs   Visual/  Perceptual Glasses: Present; WFL     Reports change in vision since admission: No      NA      Hand Dominance  [x] Right   [] Left    AROM (PROM) Strength Additional Info: Goal:   RUE WFL 4/5 good  and  FMC/dexterity noted during ADL tasks  Opposition [x] Intact [] Impaired  Finger to nose [x] Intact [] Impaired 5/5MMT generally for carry over into self care, functional transfers and functional mobility with AD. LUE WFL 4/5 good  and  FMC/dexterity noted during ADL tasks  Opposition [x] Intact [] Impaired  Finger to nose [x] Intact [] Impaired 5/5MMT generally for carry over into self care, functional transfers and functional mobility with AD.    - BUE strengthening exercises: 10-15 reps in all planes of movement with mod resist theraband to increase/maintain strength required for functional transfers/ADL participation. Exercises completed in shoulder and elbow flexion/extension, internal/external rotation and abduction/adduction. Min rest breaks provided 2* to decreased endurance and \"feeling it\" in B shoulders. Ex's completed when pt seated in chair. Hearing: WFL  Sensation:  No c/o numbness or tingling  Tone: WFL  Edema: R LE       Comments: Patient cleared by nursing staff. Upon arrival pt supine in bed with HOB partially elevated. Sister present, however left when pt transferred to [facility]. Pt agreeable to OT tx session. Pt educated with regards to bed mobility, hand placement, safety awareness, static sitting balance,  standing balance, hip precautions, transfer training, functional mobility,  LE/UE dressing, toileting/hygiene, B UE strengthening ex's, ECT's. At end of session pt seated in chair with all needs within reach; nsg aware. Overall, pt demonstrated decreased independence and safety during completion of ADL/functional transfers/mobility tasks. Pt would benefit from continued skilled OT to increase safety and independence with completion of ADL/IADL tasks for functional independence and quality of life. Pt required cues and education as noted above for safe facilitation and completion of tasks. Therapist provided skilled monitoring of patient's response during treatment session.  Prior to and at the end of session, environmental modifications/line management completed for patients safety and efficiency of treatment session. Overall, patient demonstrates minimal/moderate difficulties with completion of BADLs and IADLs. Factors contributing to these difficulties include hip precautions, apparent L foot drop,  decreased endurance, and generalized weakness. As noted above, patient likely to benefit from further OT intervention to increase independence, safety, and overall quality of life. Treatment:     Bed mobility: Facilitated bed mobility with cues for proper body mechanics and sequencing to prepare for ADL completion. Functional transfers: Facilitated transfers from various surfaces with cues for body alignment, safety and hand placement. ADL completion: Self-care retraining for the above-mentioned ADLs; training on proper hand placement, safety technique, sequencing, and energy conservation techniques. Postural Balance: Sitting/standing balance retraining to improve righting reactions with postural changes during ADLs. Skilled positioning: Proper positioning to improve interaction with environment, overall functioning  Therapeutic Ex's: To increase B UE strength required for functional transfers weakness in L LE (non surgical LE)       Pt has made fair/fair minus progress towards set goals    OT 1-3 days/wk for 2 weeks PRN     Treatment Time also includes thorough review of current medical information, gathering information on past medical history/social history and prior level of function, informal observation of tasks, assessment of data and education on plan of care and goals.     Treatment Time In: 10:13 AM       Treatment Time Out: 10:44 AM            Treatment Charges: Mins Units   ADL/Home Mgt     36976 10 1   Thera Activities     78507 6 0   Ther Ex                 64621 15 1   Manual Therapy    43089     Neuro Re-ed         96901     Orthotic manage/training                               95928     Non Billable Time     Total Timed Treatment 31 2 Veda Diaz, [facility] Maria M Soni

## 2022-08-15 NOTE — PROGRESS NOTES
Limited 2 d echo completed along with bubble study x 2 and 2cc definity given by the Gianni Prescott RCS

## 2022-08-15 NOTE — PROGRESS NOTES
Department of Orthopedic Surgery  Resident Progress Note    Patient seen and examined. Pain is well controlled today, though he does note left foot weakness that started suddenly last night. Surgery was on 8/13, and after surgery he said his left lower extremity was normal, however yesterday in the late afternoon he noticed suddenly that he was having trouble plantar flexing and dorsiflexing his left foot. Denies any paresthesias in the bilateral lower extremities.     VITALS:  BP (!) 148/74   Pulse 79   Temp 97.9 °F (36.6 °C) (Oral)   Resp 18   Ht 5' 8\" (1.727 m)   Wt 175 lb (79.4 kg)   SpO2 100%   BMI 26.61 kg/m²     General: alert and oriented to person, place and time, well-developed and well-nourished, in no acute distress    MUSCULOSKELETAL:   right lower extremity:  Dressing C/D/I  Compartments soft and compressible  +PF/DF/EHL  +2/4 DP & PT pulses, Brisk Cap refill, Toes warm and perfused  Distal sensation grossly intact to Peroneals, Sural, Saphenous, and tibial nrs    Left lower extremity:  Skin intact circumferentially  Compartments of the thigh and leg are soft and compressible  No TTP about the lower extremity, Tinel's negative at the proximal leg  Normal active flexion extension at the knee and hip  Decreased passive range of motion of the left ankle  Sensation light touch is grossly intact in the peroneal, sural, saphenous, tibial distribution  With Babinski testing, patient will withdraw from the hip and knee, however there is no active motion of the toes or ankle  Toes are warm and perfused, brisk capillary refill    CBC:   Lab Results   Component Value Date/Time    WBC 12.8 08/15/2022 04:32 AM    HGB 8.5 08/15/2022 04:32 AM    HCT 26.2 08/15/2022 04:32 AM     08/15/2022 04:32 AM     PT/INR:    Lab Results   Component Value Date/Time    PROTIME 10.9 08/11/2022 07:11 PM    INR 1.0 08/11/2022 07:11 PM       ASSESSMENT  S/P R HHA - 8/13/22  Left foot weakness    PLAN      Continue physical therapy and protocol: WBAT - RLE  Deep venous thrombosis prophylaxis - ASA 81, early mobilization  The left lower extremity exam concerning for left foot weakness, however patient has no sensory deficits and states that the weakness started yesterday in the afternoon relatively suddenly. No known injury, and remainder of neurologic exam is unremarkable  PT/OT  Pain Control: PO  Monitor H&H  D/C Plan: will discuss with medicine, patient can likely still be discharged home, with plan to follow left foot symptoms.   Appreciate PT recommendations

## 2022-08-15 NOTE — PROGRESS NOTES
Pt reports inability to move left ankle during routine assessment. New onset. No pain. This is following hemiarthroplasty of right hip 08/13/22 and full range of motion of both ankles demonstrated this morning.

## 2022-08-15 NOTE — PROGRESS NOTES
Physical Therapy  Physical Therapy Treatment Note/Plan of Care    Room #:  0315/0315-01  Patient Name: Sawyer Parkinson  YOB: 1950  MRN: [id number]    Date of Service: 8/15/2022     Tentative placement recommendation: Inpatient Rehab  Equipment recommendation: To be determined and Wheeled Walker      Evaluating Physical Therapist: Padmini Hatfield PT, DPT #[id number]      Specific Provider Orders/Date/Referring Provider :     08/13/22 1215    PT eval and treat  Start:  08/13/22 1215,   End:  08/13/22 1215,   ONE TIME,   Standing Count:  1 Occurrences,   R         Jose Angel Perez DO Acknowledge New     Admitting Diagnosis:   Closed right hip fracture, initial encounter [facility]) [S72.001A]  Closed hip fracture, right, initial encounter [facility]) [S72.001A]      Surgery: R hip hemiarthroplasty on 8/13/22  Visit Diagnoses         Codes    S/P right hip fracture     Z87.81            Patient Active Problem List   Diagnosis    Closed right hip fracture, initial encounter ([facility] [address]    Hip fracture ([facility] [address]    Left foot drop        ASSESSMENT of Current Deficits Patient exhibits decreased strength, balance, and endurance impairing functional mobility, transfers, gait , gait distance, and tolerance to activity. Patient with AAROM on RLE exercises. Reviewed hip precautions. Patient is concerned about his left foot drop that just happened yesterday afternoon. Patient did not want to get out of bed due to being to tired and he was up earlier this am. Patient would benefit from continued therapy for strengthening and endurance for safety to decrease risk of falls.        PHYSICAL THERAPY  PLAN OF CARE       Physical therapy plan of care is established based on physician order,  patient diagnosis and clinical assessment    Current Treatment Recommendations:    -Bed Mobility: Lower extremity exercises  and Trunk control activities   -Sitting Balance: Incorporate reaching activities to activate trunk muscles , Hands on support to maintain midline , Facilitate active trunk muscle engagement , Facilitate postural control in all planes , and Engage in core activities to allow for movement within base of support   -Standing Balance: Perform strengthening exercises in standing to promote motor control with or without upper extremity support , Instruct patient on adequate base of support to maintain balance, and Challenge balance utilizing reaching  activities beyond center of gravity    -Transfers: Provide instruction on proper hand and foot position for adequate transfer of weight onto lower extremities and use of gait device if needed, Cues for hand placement, technique and safety. Provide stabilization to prevent fall , Facilitate weight shift forward on to lower extremities and provide necessary stabilization of bilateral lower extremities , Support transfer of weight on to lower extremities, and Assist with extension of knees trunk and hip to accept weight transfer   -Gait: Gait training, Standing activities to improve: base of support, weight shift, weight bearing , Exercises to improve trunk control, Exercises to improve hip and knee control, Performance of protected weight bearing activities, and Activities to increase weight bearing   -Endurance: Utilize Supervised activities to increase level of endurance to allow for safe functional mobility including transfers and gait  and Use graduated activities to promote good breathing techniques and provide support and education to maximize respiratory function  -Stairs: Stair training with instruction on proper technique and hand placement on rail    PT long term treatment goals are located in below grid    Patient and or family understand(s) diagnosis, prognosis, and plan of care. Frequency of treatments: Patient will be seen  twice daily.          Prior Level of Function: Patient ambulated independently   Rehab Potential: good  for baseline    Past medical history:   Past Medical History: Diagnosis Date    Diverticulitis     Gout     Hypertension     Tobacco abuse      Past Surgical History:   Procedure Laterality Date    APPENDECTOMY      CATARACT REMOVAL WITH IMPLANT Right 2 2 15    CATARACT REMOVAL WITH IMPLANT Left 4/6/15    COLONOSCOPY  7 years ago    Dr Chayo Jade      child       SUBJECTIVE:    Precautions: Up with assistance, falls, full weight bearing RLE, and hip precautions      Social history: Patient lives alone in a ranch home  with 3 steps  to enter with Rail  Tub shower     Equipment owned: [employer],      [address]   How much difficulty turning over in bed?: A Little  How much difficulty sitting down on / standing up from a chair with arms?: A Lot  How much difficulty moving from lying on back to sitting on side of bed?: A Little  How much help from another person moving to and from a bed to a chair?: A Lot  How much help from another person needed to walk in hospital room?: A Lot  How much help from another person for climbing 3-5 steps with a railing?: A Lot  AM-PAC Inpatient Mobility Raw Score : 14  AM-PAC Inpatient T-Scale Score : 38.1  Mobility Inpatient CMS 0-100% Score: 61.29  Mobility Inpatient CMS G-Code Modifier : CL    Nursing cleared patient for PT treatment. Patient c/o R hip pain and left foot drop. OBJECTIVE;   Initial Evaluation  Date: 8/13/2022 Treatment Date:  8/15/2022     Short Term/ Long Term   Goals   Was pt agreeable to Eval/treatment? Yes yes To be met in 2 days   Pain level   6/10  R hip 8/10 R hip with activity    Bed Mobility    Rolling: Supervision     Supine to sit: Minimal assist of 1    Sit to supine: Minimal assist of 1    Scooting: Supervision    Rolling: Not assessed    Supine to sit: Not assessed    Sit to supine: Not assessed    Scooting: Not assessed     Rolling: Independent    Supine to sit:  Independent    Sit to supine: Independent    Scooting: Independent     Transfers Sit to stand: Min-ModA   Sit to stand: Not assessed     Sit to stand: Modified Independent    Ambulation     25 feet using  wheeled walker with Minimal assist of 1   for walker control, balance, upright, and safety not assessed     > 75 feet using  wheeled walker with Modified Independent    Stair negotiation: ascended and descended   Not assessed  Not assessed      3 steps with 1 HR with Mod I   ROM Within functional limits    Increase range of motion 10% of affected joints    Strength BUE:  refer to OT eval  RLE:  3+/5  LLE:  4+/5  Increase strength in affected mm groups by 1/3 grade   Balance Sitting EOB:  fair +  Dynamic Standing:  fair  Sitting EOB: not assessed   Dynamic Standing: not assessed    Sitting EOB:  good   Dynamic Standing: fair +     Patient is Alert & Oriented x person, place, time, and situation and follows directions    Sensation:  Patient  denies numbness/tingling   Edema:  no   Endurance: fair     Vitals: room air   Blood Pressure at rest  Blood Pressure during session    Heart Rate at rest  Heart Rate during session    SPO2 at rest %  SPO2 during session %     Patient education  Patient educated on role of Physical Therapy, risks of immobility, safety and plan of care, energy conservation,  importance of mobility while in hospital , purse lip breathing, hip precautions, safety , stair training , weight bearing status , and seated exercises      Patient response to education:   Pt verbalized understanding Pt demonstrated skill Pt requires further education in this area   Yes Partial Yes      Treatment:  Patient practiced and was instructed/facilitated in the following treatment: Patient   performed supine exercises. Therapeutic Exercises:  ankle pumps, quad sets, glut sets, heel slide, hip abduction/adduction, and straight leg raise,  x 10-15 reps. At end of session, patient in bed with nursing present call light and phone within reach,  all lines and tubes intact, nursing notified. Patient would benefit from continued skilled Physical Therapy to improve functional independence and quality of life. Patient's/ family goals   rehab    Time in 15:55  Time out  16:07    Total Treatment Time  12 minutes    CPT codes:  Therapeutic exercises (17716)   12 minutes  1 unit(s)    Roque Caro  [facility]  LIC # [id number]

## 2022-08-15 NOTE — PROGRESS NOTES
Attempted tx with pt, however Dr. Neo Albert is currently talking with pt. Will attempt tx with pt at later time/date.  Lisest Ko, [address]

## 2022-08-15 NOTE — PLAN OF CARE
Problem: Musculoskeletal - Adult  Goal: Return mobility to safest level of function  8/15/2022 0200 by Ruy Montgomery, RN  Outcome: Not Progressing  Flowsheets (Taken 8/14/2022 2338)  Return Mobility to Safest Level of Function:   Assess patient stability and activity tolerance for standing, transferring and ambulating with or without assistive devices   Assist with transfers and ambulation using safe patient handling equipment as needed   Ensure adequate protection for wounds/incisions during mobilization   Instruct patient/family in ordered activity level    Pt reports new onset left ankle problem and inability to stand. Care team notified.   8/14/2022 1602 by Kirk Medrano, RN  Outcome: Adequate for Discharge

## 2022-08-15 NOTE — PROGRESS NOTES
Internal Medicine Progress Note    TO=[facility]    Deb Chen. Mi Miller., F.JOSE.BREOYANIRA. Gilbert Mariano D.O., JAVON Arredondo D.O. Key Soto, MSN, APRN, NP-C  Frankie Ji. China Rasheed, MSN, APRN-CNP     Primary Care Physician: Deion Palmer DO   Admitting Physician:  Eriberto Howell DO  Admission date and time: 8/11/2022  5:41 PM    Room:  [address] [place] [address]  Admitting diagnosis: Closed right hip fracture, initial encounter [facility]) [S72.001A]  Closed hip fracture, right, initial encounter [facility]) [facility]    Patient Name: Jazmin Rhodes  MRN: [id number]    Date of Service: 8/15/2022     Subjective: Jin Segura is a 81 y.o. male who was seen and examined today,8/15/2022, at the bedside. Jin Segura did very well with the therapy teams yesterday. Interestingly, last evening he began having difficulty moving his left foot. He has no sensory deficits and pulses are intact. We will assess his response to the therapy team this morning. Review of System:   Constitutional:   Denies fever or chills, weight loss or gain, fatigue or malaise. HEENT:   Denies ear pain, sore throat, sinus or eye problems. Cardiovascular:   Denies any chest pain, irregular heartbeats, or palpitations. Respiratory:   Denies shortness of breath, coughing, sputum production, hemoptysis, or wheezing. Gastrointestinal:   Denies nausea, vomiting, diarrhea, or constipation. Denies any abdominal pain. Genitourinary:    Denies any urgency, frequency, hematuria. Voiding  without difficulty. Extremities:   Postoperative pain as to be expected. Neurology:    Denies any headache or focal neurological deficits, Denies generalized weakness or memory difficulty. Psch:   Denies being anxious or depressed. Musculoskeletal:    Admits to mild postoperative pain as to be expected. Now describes left foot weakness. Integumentary:   Denies any rashes, ulcers, or excoriations.   Denies bruising. Hematologic/Lymphatic:  Denies bruising or bleeding. Physical Exam:  No intake/output data recorded. Intake/Output Summary (Last 24 hours) at 8/15/2022 0713  Last data filed at 8/14/2022 1724  Gross per 24 hour   Intake 360 ml   Output 1200 ml   Net -840 ml   I/O last 3 completed shifts: In: 360 [P.O.:360]  Out: 1800 [XVTCR:3395]  Patient Vitals for the past 96 hrs (Last 3 readings):   Weight   08/11/22 2004 175 lb (79.4 kg)   08/11/22 1742 175 lb (79.4 kg)     Vital Signs:   Blood pressure (!) 148/74, pulse 79, temperature 97.9 °F (36.6 °C), temperature source Oral, resp. rate 18, height 5' 8\" (1.727 m), weight 175 lb (79.4 kg), SpO2 100 %. General appearance:  Alert, responsive, oriented to person, place, and time. Comfortable, no distress. Head:  Normocephalic. No masses, lesions or tenderness. Eyes:  PERRLA. EOMI. Sclera clear. Buccal mucosa moist.  ENT:  Ears normal. Mucosa normal.  Neck:    Supple. Trachea midline. No thyromegaly. No JVD. No bruits. Heart:    Rhythm regular. Rate controlled. No murmurs. Lungs:    Symmetrical.  Mildly diminished air exchange, otherwise lungs are clear to auscultation bilaterally. Abdomen:   Soft. Non-tender. Non-distended. Bowel sounds positive. No organomegaly or masses. No pain on palpation. Extremities:    Peripheral pulses present. No significant pitting peripheral edema. No ulcers. No cyanosis. No clubbing. Neurologic:    Alert x 3. No focal deficit. Cranial nerves grossly intact. No focal weakness. Psych:   Behavior is normal. Mood appears normal. Speech is not rapid and/or pressured. Musculoskeletal:   Unremarkable postoperative appearance of the right hip, compartments are soft and nontender and distal neurovascular examination is intact. Gait not assessed. Integumentary:  No rashes  Skin normal color and texture.   Genitalia/Breast:  Deferred    Medication:  Scheduled Meds:   insulin lispro  0-4 Units SubCUTAneous TID WC insulin lispro  0-4 Units SubCUTAneous Nightly    sodium chloride flush  5-40 mL IntraVENous 2 times per day    aspirin  81 mg Oral Daily    [Held by provider] allopurinol  100 mg Oral Daily    [Held by provider] lisinopril  30 mg Oral Daily    budesonide  0.5 mg Nebulization BID    Arformoterol Tartrate  15 mcg Nebulization BID    ipratropium-albuterol  1 ampule Inhalation Q4H WA    sodium chloride flush  5-40 mL IntraVENous 2 times per day    hydroxychloroquine  200 mg Oral Daily    pantoprazole  40 mg Oral QAM AC     Continuous Infusions:   sodium chloride      dextrose      sodium chloride         Objective Data:  CBC with Differential:    Lab Results   Component Value Date/Time    WBC 12.8 08/15/2022 04:32 AM    RBC 2.62 08/15/2022 04:32 AM    HGB 8.5 08/15/2022 04:32 AM    HCT 26.2 08/15/2022 04:32 AM     08/15/2022 04:32 AM    .0 08/15/2022 04:32 AM    MCH 32.4 08/15/2022 04:32 AM    MCHC 32.4 08/15/2022 04:32 AM    RDW 13.3 08/15/2022 04:32 AM    LYMPHOPCT 8.1 08/15/2022 04:32 AM    MONOPCT 9.1 08/15/2022 04:32 AM    BASOPCT 0.1 08/15/2022 04:32 AM    MONOSABS 1.16 08/15/2022 04:32 AM    LYMPHSABS 1.04 08/15/2022 04:32 AM    EOSABS 0.02 08/15/2022 04:32 AM    BASOSABS 0.01 08/15/2022 04:32 AM     BMP:    Lab Results   Component Value Date/Time     08/15/2022 04:32 AM    K 4.2 08/15/2022 04:32 AM    K 4.6 08/11/2022 07:11 PM     08/15/2022 04:32 AM    CO2 25 08/15/2022 04:32 AM    BUN 27 08/15/2022 04:32 AM    LABALBU 3.0 08/14/2022 04:33 AM    CREATININE 1.3 08/15/2022 04:32 AM    CALCIUM 9.4 08/15/2022 04:32 AM    GFRAA >60 08/15/2022 04:32 AM    LABGLOM 55 08/15/2022 04:32 AM    GLUCOSE 128 08/15/2022 04:32 AM       Assessment:  Closed right basicervical femoral neck fracture  Postoperative anemia   CAYETANO on chronic kidney disease stage IIIa, stabilized  Hyperglycemia without documented history of diabetes   Essential hypertension  Non-oxygen dependent COPD with chronic tobacco abuse  History of gout    Plan:   The patient did very well with the therapy teams yesterday and plans were for discharge home today. Interestingly, he developed left foot weakness overnight of unclear etiology. He denies any overt trauma or unusual positions. He has intact sensation. We will assess his response with the therapy teams this morning. I have personally discussed the case with the orthopedic team.  The patient's chronic comorbidities are otherwise well controlled. More than 50% of my time was spent at the bedside counseling/coordinating care with the patient and/or family with face to face contact. This time was spent reviewing notes and laboratory data as well as instructing and counseling the patient. Time I spent with the family or surrogate(s) is included only if the patient was incapable of providing the necessary information or participating in medical decisions. I also discussed the differential diagnosis and all of the proposed management plans with the patient and individuals accompanying the patient. I am readily available for any further decision-making and intervention.        Reyna Martinze DO,   8/15/2022  7:13 AM

## 2022-08-16 ENCOUNTER — APPOINTMENT (OUTPATIENT)
Dept: ULTRASOUND IMAGING | Age: 72
DRG: 521 | End: 2022-08-16
Payer: MEDICARE

## 2022-08-16 PROBLEM — I63.9 STROKE (CEREBRUM) (HCC): Status: ACTIVE | Noted: 2022-08-16

## 2022-08-16 LAB
ANION GAP SERPL CALCULATED.3IONS-SCNC: 9 MMOL/L (ref 7–16)
BASOPHILS ABSOLUTE: 0.01 E9/L (ref 0–0.2)
BASOPHILS RELATIVE PERCENT: 0.1 % (ref 0–2)
BUN BLDV-MCNC: 19 MG/DL (ref 6–23)
CALCIUM SERPL-MCNC: 9.4 MG/DL (ref 8.6–10.2)
CHLORIDE BLD-SCNC: 101 MMOL/L (ref 98–107)
CO2: 26 MMOL/L (ref 22–29)
CREAT SERPL-MCNC: 1.1 MG/DL (ref 0.7–1.2)
EOSINOPHILS ABSOLUTE: 0.1 E9/L (ref 0.05–0.5)
EOSINOPHILS RELATIVE PERCENT: 1.1 % (ref 0–6)
GFR AFRICAN AMERICAN: >60
GFR NON-AFRICAN AMERICAN: >60 ML/MIN/1.73
GLUCOSE BLD-MCNC: 73 MG/DL (ref 74–99)
HCT VFR BLD CALC: 26.5 % (ref 37–54)
HEMOGLOBIN: 8.7 G/DL (ref 12.5–16.5)
IMMATURE GRANULOCYTES #: 0.03 E9/L
IMMATURE GRANULOCYTES %: 0.3 % (ref 0–5)
LYMPHOCYTES ABSOLUTE: 1.07 E9/L (ref 1.5–4)
LYMPHOCYTES RELATIVE PERCENT: 11.8 % (ref 20–42)
MCH RBC QN AUTO: 33.1 PG (ref 26–35)
MCHC RBC AUTO-ENTMCNC: 32.8 % (ref 32–34.5)
MCV RBC AUTO: 100.8 FL (ref 80–99.9)
METER GLUCOSE: 122 MG/DL (ref 74–99)
METER GLUCOSE: 150 MG/DL (ref 74–99)
METER GLUCOSE: 73 MG/DL (ref 74–99)
METER GLUCOSE: 77 MG/DL (ref 74–99)
METER GLUCOSE: 84 MG/DL (ref 74–99)
MONOCYTES ABSOLUTE: 1.04 E9/L (ref 0.1–0.95)
MONOCYTES RELATIVE PERCENT: 11.5 % (ref 2–12)
NEUTROPHILS ABSOLUTE: 6.81 E9/L (ref 1.8–7.3)
NEUTROPHILS RELATIVE PERCENT: 75.2 % (ref 43–80)
PDW BLD-RTO: 13.2 FL (ref 11.5–15)
PLATELET # BLD: 282 E9/L (ref 130–450)
PMV BLD AUTO: 9.8 FL (ref 7–12)
POTASSIUM SERPL-SCNC: 4.4 MMOL/L (ref 3.5–5)
RBC # BLD: 2.63 E12/L (ref 3.8–5.8)
SARS-COV-2, NAAT: NOT DETECTED
SODIUM BLD-SCNC: 136 MMOL/L (ref 132–146)
WBC # BLD: 9.1 E9/L (ref 4.5–11.5)

## 2022-08-16 PROCEDURE — 85025 COMPLETE CBC W/AUTO DIFF WBC: CPT

## 2022-08-16 PROCEDURE — 6360000002 HC RX W HCPCS: Performed by: NURSE PRACTITIONER

## 2022-08-16 PROCEDURE — 6360000002 HC RX W HCPCS: Performed by: INTERNAL MEDICINE

## 2022-08-16 PROCEDURE — 6370000000 HC RX 637 (ALT 250 FOR IP): Performed by: INTERNAL MEDICINE

## 2022-08-16 PROCEDURE — 36415 COLL VENOUS BLD VENIPUNCTURE: CPT

## 2022-08-16 PROCEDURE — 93005 ELECTROCARDIOGRAM TRACING: CPT | Performed by: INTERNAL MEDICINE

## 2022-08-16 PROCEDURE — 94640 AIRWAY INHALATION TREATMENT: CPT

## 2022-08-16 PROCEDURE — 97530 THERAPEUTIC ACTIVITIES: CPT

## 2022-08-16 PROCEDURE — 80048 BASIC METABOLIC PNL TOTAL CA: CPT

## 2022-08-16 PROCEDURE — 97110 THERAPEUTIC EXERCISES: CPT

## 2022-08-16 PROCEDURE — 2580000003 HC RX 258: Performed by: INTERNAL MEDICINE

## 2022-08-16 PROCEDURE — 93880 EXTRACRANIAL BILAT STUDY: CPT

## 2022-08-16 PROCEDURE — 87635 SARS-COV-2 COVID-19 AMP PRB: CPT

## 2022-08-16 PROCEDURE — 82962 GLUCOSE BLOOD TEST: CPT

## 2022-08-16 PROCEDURE — 1200000000 HC SEMI PRIVATE

## 2022-08-16 RX ORDER — MORPHINE SULFATE 2 MG/ML
2 INJECTION, SOLUTION INTRAMUSCULAR; INTRAVENOUS EVERY 6 HOURS PRN
Status: DISCONTINUED | OUTPATIENT
Start: 2022-08-16 | End: 2022-08-17 | Stop reason: HOSPADM

## 2022-08-16 RX ADMIN — CLOPIDOGREL BISULFATE 75 MG: 75 TABLET ORAL at 08:24

## 2022-08-16 RX ADMIN — ATORVASTATIN CALCIUM 40 MG: 40 TABLET, FILM COATED ORAL at 21:05

## 2022-08-16 RX ADMIN — IPRATROPIUM BROMIDE AND ALBUTEROL SULFATE 1 AMPULE: .5; 2.5 SOLUTION RESPIRATORY (INHALATION) at 05:43

## 2022-08-16 RX ADMIN — HYDROXYCHLOROQUINE SULFATE 200 MG: 200 TABLET, FILM COATED ORAL at 08:24

## 2022-08-16 RX ADMIN — ARFORMOTEROL TARTRATE 15 MCG: 15 SOLUTION RESPIRATORY (INHALATION) at 05:44

## 2022-08-16 RX ADMIN — IPRATROPIUM BROMIDE AND ALBUTEROL SULFATE 1 AMPULE: .5; 2.5 SOLUTION RESPIRATORY (INHALATION) at 09:13

## 2022-08-16 RX ADMIN — IPRATROPIUM BROMIDE AND ALBUTEROL SULFATE 1 AMPULE: .5; 2.5 SOLUTION RESPIRATORY (INHALATION) at 16:04

## 2022-08-16 RX ADMIN — OXYCODONE 10 MG: 5 TABLET ORAL at 03:22

## 2022-08-16 RX ADMIN — SODIUM CHLORIDE: 9 INJECTION, SOLUTION INTRAVENOUS at 15:09

## 2022-08-16 RX ADMIN — BUDESONIDE 500 MCG: 0.5 SUSPENSION RESPIRATORY (INHALATION) at 16:04

## 2022-08-16 RX ADMIN — IPRATROPIUM BROMIDE AND ALBUTEROL SULFATE 1 AMPULE: .5; 2.5 SOLUTION RESPIRATORY (INHALATION) at 12:35

## 2022-08-16 RX ADMIN — ASPIRIN 81 MG CHEWABLE TABLET 81 MG: 81 TABLET CHEWABLE at 08:24

## 2022-08-16 RX ADMIN — MORPHINE SULFATE 2 MG: 2 INJECTION, SOLUTION INTRAMUSCULAR; INTRAVENOUS at 18:02

## 2022-08-16 RX ADMIN — MORPHINE SULFATE 2 MG: 2 INJECTION, SOLUTION INTRAMUSCULAR; INTRAVENOUS at 01:45

## 2022-08-16 RX ADMIN — PANTOPRAZOLE SODIUM 40 MG: 40 TABLET, DELAYED RELEASE ORAL at 06:52

## 2022-08-16 RX ADMIN — OXYCODONE 10 MG: 5 TABLET ORAL at 08:26

## 2022-08-16 RX ADMIN — BUDESONIDE 500 MCG: 0.5 SUSPENSION RESPIRATORY (INHALATION) at 05:44

## 2022-08-16 RX ADMIN — ARFORMOTEROL TARTRATE 15 MCG: 15 SOLUTION RESPIRATORY (INHALATION) at 16:04

## 2022-08-16 ASSESSMENT — PAIN SCALES - GENERAL
PAINLEVEL_OUTOF10: 8
PAINLEVEL_OUTOF10: 6
PAINLEVEL_OUTOF10: 1
PAINLEVEL_OUTOF10: 1
PAINLEVEL_OUTOF10: 9
PAINLEVEL_OUTOF10: 7

## 2022-08-16 ASSESSMENT — PAIN - FUNCTIONAL ASSESSMENT
PAIN_FUNCTIONAL_ASSESSMENT: PREVENTS OR INTERFERES SOME ACTIVE ACTIVITIES AND ADLS
PAIN_FUNCTIONAL_ASSESSMENT: PREVENTS OR INTERFERES SOME ACTIVE ACTIVITIES AND ADLS

## 2022-08-16 ASSESSMENT — PAIN DESCRIPTION - ORIENTATION
ORIENTATION: RIGHT
ORIENTATION: RIGHT

## 2022-08-16 ASSESSMENT — PAIN DESCRIPTION - LOCATION
LOCATION: SHOULDER
LOCATION: SHOULDER;HIP
LOCATION: HIP;SHOULDER

## 2022-08-16 ASSESSMENT — PAIN DESCRIPTION - DESCRIPTORS
DESCRIPTORS: ACHING;DULL;THROBBING
DESCRIPTORS: ACHING;THROBBING;STABBING;SHOOTING

## 2022-08-16 NOTE — PROGRESS NOTES
On call Ortho resident notified via [employer] of patient's inability to plantar flex and extend nonsurgical foot(left). No other deficits noted. Waiting for response.

## 2022-08-16 NOTE — PLAN OF CARE
Problem: Discharge Planning  Goal: Discharge to home or other facility with appropriate resources  Outcome: Progressing     Problem: Pain  Goal: Verbalizes/displays adequate comfort level or baseline comfort level  Outcome: Progressing     Problem: Skin/Tissue Integrity  Goal: Absence of new skin breakdown  Outcome: Progressing     Problem: ABCDS Injury Assessment  Goal: Absence of physical injury  Outcome: Progressing  Flowsheets (Taken 8/16/2022 1307)  Absence of Physical Injury: Implement safety measures based on patient assessment     Problem: Safety - Adult  Goal: Free from fall injury  Outcome: Progressing  Flowsheets (Taken 8/16/2022 1307)  Free From Fall Injury: Instruct family/caregiver on patient safety     Problem: Safety - Adult  Goal: Free from fall injury  Outcome: Progressing  Flowsheets (Taken 8/16/2022 1307)  Free From Fall Injury: Instruct family/caregiver on patient safety     Problem: Skin/Tissue Integrity - Adult  Goal: Incisions, wounds, or drain sites healing without S/S of infection  Outcome: Progressing     Problem: Musculoskeletal - Adult  Goal: Return mobility to safest level of function  Outcome: Progressing     Problem: Gastrointestinal - Adult  Goal: Maintains or returns to baseline bowel function  Outcome: Progressing     Problem: Infection - Adult  Goal: Absence of infection at discharge  Outcome: Progressing     Problem: Infection - Adult  Goal: Absence of infection at discharge  Outcome: Progressing     Problem: Metabolic/Fluid and Electrolytes - Adult  Goal: Glucose maintained within prescribed range  Outcome: Progressing

## 2022-08-16 NOTE — PROGRESS NOTES
Lower extremity exercises  and Trunk control activities   -Sitting Balance: Incorporate reaching activities to activate trunk muscles , Hands on support to maintain midline , Facilitate active trunk muscle engagement , Facilitate postural control in all planes , and Engage in core activities to allow for movement within base of support   -Standing Balance: Perform strengthening exercises in standing to promote motor control with or without upper extremity support , Instruct patient on adequate base of support to maintain balance, and Challenge balance utilizing reaching  activities beyond center of gravity    -Transfers: Provide instruction on proper hand and foot position for adequate transfer of weight onto lower extremities and use of gait device if needed, Cues for hand placement, technique and safety. Provide stabilization to prevent fall , Facilitate weight shift forward on to lower extremities and provide necessary stabilization of bilateral lower extremities , Support transfer of weight on to lower extremities, and Assist with extension of knees trunk and hip to accept weight transfer   -Gait: Gait training, Standing activities to improve: base of support, weight shift, weight bearing , Exercises to improve trunk control, Exercises to improve hip and knee control, Performance of protected weight bearing activities, and Activities to increase weight bearing   -Endurance: Utilize Supervised activities to increase level of endurance to allow for safe functional mobility including transfers and gait  and Use graduated activities to promote good breathing techniques and provide support and education to maximize respiratory function  -Stairs: Stair training with instruction on proper technique and hand placement on rail    PT long term treatment goals are located in below grid    Patient and or family understand(s) diagnosis, prognosis, and plan of care.     Frequency of treatments: Patient will be seen  twice daily.         Prior Level of Function: Patient ambulated independently   Rehab Potential: good  for baseline    Past medical history:   Past Medical History:   Diagnosis Date    Diverticulitis     Gout     Hypertension     Tobacco abuse      Past Surgical History:   Procedure Laterality Date    APPENDECTOMY      CATARACT REMOVAL WITH IMPLANT Right 2 2 15    CATARACT REMOVAL WITH IMPLANT Left 4/6/15    COLONOSCOPY  7 years ago    Dr King Casarez      child       SUBJECTIVE:    Precautions: Up with assistance, falls, full weight bearing RLE, and hip precautions      MRI OF THE BRAIN WITHOUT AND WITH CONTRAST  8/15/2022 12:01 pm  1. Tiny acute infarcts are seen involving the parietal lobes bilaterally,   right more than left. A punctate acute infarct also seen in the right   occipital lobe. No mass effect or midline shift. 2. Otherwise, no acute intracranial abnormality. 3. Minimal global parenchymal volume loss with minimal chronic microvascular   ischemic changes. MRI OF THE LUMBAR SPINE WITHOUT CONTRAST, 8/15/2022 12:02 pm  1. There is a central/right paracentral disc protrusion at L4-L5, contacting   the right L5 nerve root in the lateral recess and resulting in   moderate-severe central spinal canal narrowing. 2. Moderate central spinal canal narrowing at L2-L3 and L3-L4. 3. Multilevel foraminal narrowing, as above. 4. No evidence of acute fracture. 5. The bladder is distended with urine. The patient may benefit from   catheterization if he cannot void on his own.        Social history: Patient lives alone in a ranch home  with 3 steps  to enter with Rail  Tub shower     Equipment owned: [address]   How much difficulty turning over in bed?: A Little  How much difficulty sitting down on / standing up from a chair with arms?: A Lot  How much difficulty moving from lying on back to sitting on side of bed?: A Lot  How much help from another person moving to and from a bed to a chair?: A Little  How much help from another person needed to walk in hospital room?: A Little  How much help from another person for climbing 3-5 steps with a railing?: A Lot  AM-PAC Inpatient Mobility Raw Score : 15  AM-PAC Inpatient T-Scale Score : 39.45  Mobility Inpatient CMS 0-100% Score: 57.7  Mobility Inpatient CMS G-Code Modifier : CK    Nursing cleared patient for PT treatment. Patient c/o R hip pain and left foot drop. OBJECTIVE;   Initial Evaluation  Date: 8/13/2022 Treatment Date:  8/16/2022     Short Term/ Long Term   Goals   Was pt agreeable to Eval/treatment? Yes yes To be met in 2 days   Pain level   6/10  R hip 8/10 R hip with activity    Bed Mobility    Rolling: Supervision     Supine to sit: Minimal assist of 1    Sit to supine: Minimal assist of 1    Scooting: Supervision    Rolling: Minimal assist of 1   Supine to sit: Moderate assist of 1   Sit to supine: Moderate assist of 1   Scooting: Moderate assist of 1    Rolling: Supervision     Supine to sit: Supervision     Sit to supine: Supervision     Scooting: Supervision      Transfers Sit to stand:  Min-ModA   Sit to stand:   Moderate/Maximal assist x 1   Cues for hand placement and safety     Sit to stand: Minimal assist of 1    Ambulation     25 feet using  wheeled walker with Minimal assist of 1   for walker control, balance, upright, and safety 2 x 10 feet; 3 feet using  wheeled walker with Minimal assist of 1  cues for sequencing, upright posture, increased base of support, increased step length, safety, and increased left hip/knee flexion to clear left foot       40 feet using  wheeled walker with Supervision     Stair negotiation: ascended and descended   Not assessed  Not assessed      3 steps with 1 HR with Mod assist   ROM Within functional limits    Increase range of motion 10% of affected joints    Strength BUE:  refer to OT eval  RLE:  3+/5  LLE:  4+/5   Left hip flex 3/5 knee extension 3-/5  Foot drop left Increase strength in affected mm groups by 1/3 grade   Balance Sitting EOB:  fair +  Dynamic Standing:  fair  Sitting EOB: good   Dynamic Standing: fair wheeled walker    Sitting EOB:  good   Dynamic Standing: fair + wheeled walker      Patient is Alert & Oriented x person, place, time, and situation and follows directions    Sensation:  Patient  denies numbness/tingling   Edema:  no   Endurance: fair     Vitals: room air   Blood Pressure at rest  Blood Pressure during session    Heart Rate at rest  Heart Rate during session    SPO2 at rest %  SPO2 during session %     Patient education  Patient educated on role of Physical Therapy, risks of immobility, safety and plan of care, energy conservation,  importance of mobility while in hospital , purse lip breathing, hip precautions, safety , stair training , weight bearing status , and seated exercises      Patient response to education:   Pt verbalized understanding Pt demonstrated skill Pt requires further education in this area   Yes Partial Yes      Treatment:  Patient practiced and was instructed/facilitated in the following treatment: Patient  performed supine exercises. Pt assisted to edge of bed, Sat edge of bed 20 minutes with Supervision  to increase dynamic sitting balance and activity tolerance. Pt instructed and demonstrated increased left hip/knee flexion in order to clear his left foot during swing phase of gait; also reviewed sequencing and hip precautions during ambulation. Pt having difficulty with sit to stand transfers, on our third attempt, we got up, ambulated in the room, and back to the chair. Nursing stated he is to have an EKG done, and needed him back to bed. Pt stood, took steps to the bed, and returned to supine, with pillows under each leg for comfort. Therapeutic Exercises:  ankle pumps, quad sets, glut sets, heel slide, hip abduction/adduction, and straight leg raise,  x 10-15 reps.        At end of session, patient in bed with  .  call light and phone within reach,  all lines and tubes intact, nursing notified. Patient would benefit from continued skilled Physical Therapy to improve functional independence and quality of life. Patient's/ family goals   rehab    Time in 08:45  Time out  09:40    Total Treatment Time  55 minutes    CPT codes:  Therapeutic activities (71606)   39 minutes  3 unit(s)  Therapeutic exercises (94027)   16 minutes  1 unit(s)    Eder Caro  PTA  LIC # [id number]    Madhu Moore, PT #[id number]

## 2022-08-16 NOTE — PROGRESS NOTES
Patient stated that he did not want to be transferred to [facility] unless his insurance would cover it. Call placed to on call . Waiting for response.

## 2022-08-16 NOTE — PROGRESS NOTES
Internal Medicine Progress Note    TO=[facility]    Mikey Rodney. Manish Day, JAVON Lockett D.O., JAVON Galvan D.O. Elvia Nolasco, MSN, APRN, NP-C  Nelsonminerva Herrerajustin. Viji Thomas, MSN, APRN-CNP     Primary Care Physician: Carroll Rosario DO   Admitting Physician:  Jerry Masters DO  Admission date and time: 8/11/2022  5:41 PM    Room:  [address] [place] [address]  Admitting diagnosis: Closed right hip fracture, initial encounter [facility]) [S72.001A]  Closed hip fracture, right, initial encounter [facility]) [facility]    Patient Name: Jaylan Walsh  MRN: [id number]    Date of Service: 8/16/2022     Subjective: Prachi Goldstein is a 81 y.o. male who was seen and examined today,8/16/2022, at the bedside. Complete work-up was undertaken yesterday in the setting of the abnormal neurologic findings to the left foot. Unfortunately, imaging studies revealed multiple strokes. MRI of the lumbar spine also indicated lumbar stenosis. Additional work-up was undertaken and results of been reviewed with the patient. We discussed the need for acute rehabilitation and he voiced understanding and agreement. Review of System:   Constitutional:   Denies fever or chills, weight loss or gain, fatigue or malaise. HEENT:   Denies ear pain, sore throat, sinus or eye problems. Cardiovascular:   Denies any chest pain, irregular heartbeats, or palpitations. Respiratory:   Denies shortness of breath, coughing, sputum production, hemoptysis, or wheezing. Gastrointestinal:   Denies nausea, vomiting, diarrhea, or constipation. Denies any abdominal pain. Genitourinary:    Denies any urgency, frequency, hematuria. Voiding  without difficulty. Extremities:   Postoperative pain as to be expected. Ongoing weakness with inability to plantarflex or dorsiflex the left foot. Neurology:    Denies any headache or focal neurological deficits, Denies generalized weakness or memory difficulty.    Psch:   Denies being anxious or depressed. Musculoskeletal:    Admits to mild postoperative pain as to be expected. Now describes left foot weakness. Integumentary:   Denies any rashes, ulcers, or excoriations. Denies bruising. Hematologic/Lymphatic:  Denies bruising or bleeding. Physical Exam:  I/O this shift:  In: -   Out: 575 [Urine:575]    Intake/Output Summary (Last 24 hours) at 8/16/2022 0543  Last data filed at 8/15/2022 2226  Gross per 24 hour   Intake 744.49 ml   Output 1575 ml   Net -830.51 ml   I/O last 3 completed shifts: In: 1104.5 [P.O.:840; I.V.:264.5]  Out: 2200 [Urine:2200]  No data found. Vital Signs:   Blood pressure (!) 149/66, pulse 89, temperature 98.3 °F (36.8 °C), temperature source Oral, resp. rate 18, height 5' 8\" (1.727 m), weight 175 lb (79.4 kg), SpO2 98 %. General appearance:  Alert, responsive, oriented to person, place, and time. Comfortable, no distress. Head:  Normocephalic. No masses, lesions or tenderness. Eyes:  PERRLA. EOMI. Sclera clear. Buccal mucosa moist.  ENT:  Ears normal. Mucosa normal.  Neck:    Supple. Trachea midline. No thyromegaly. No JVD. No bruits. Heart:    Rhythm regular. Rate controlled. No murmurs. Lungs:    Symmetrical.  Mildly diminished air exchange, otherwise lungs are clear to auscultation bilaterally. Abdomen:   Soft. Non-tender. Non-distended. Bowel sounds positive. No organomegaly or masses. No pain on palpation. Extremities:    Peripheral pulses present. No significant pitting peripheral edema. No ulcers. No cyanosis. No clubbing. Neurologic:    Alert x 3. No focal deficit. Cranial nerves grossly intact. No focal weakness. Inability to plantarflex or dorsiflex the left foot. Psych:   Behavior is normal. Mood appears normal. Speech is not rapid and/or pressured. Musculoskeletal:   Unremarkable postoperative appearance of the right hip, compartments are soft and nontender and distal neurovascular examination is intact.   Gait not assessed. Integumentary:  No rashes  Skin normal color and texture.   Genitalia/Breast:  Deferred    Medication:  Scheduled Meds:   clopidogrel  75 mg Oral Daily    atorvastatin  40 mg Oral Nightly    insulin lispro  0-4 Units SubCUTAneous TID WC    insulin lispro  0-4 Units SubCUTAneous Nightly    aspirin  81 mg Oral Daily    [Held by provider] allopurinol  100 mg Oral Daily    [Held by provider] lisinopril  30 mg Oral Daily    budesonide  0.5 mg Nebulization BID    Arformoterol Tartrate  15 mcg Nebulization BID    ipratropium-albuterol  1 ampule Inhalation Q4H WA    hydroxychloroquine  200 mg Oral Daily    pantoprazole  40 mg Oral QAM AC     Continuous Infusions:   sodium chloride 50 mL/hr at 08/15/22 1801    dextrose         Objective Data:  CBC with Differential:    Lab Results   Component Value Date/Time    WBC 9.1 08/16/2022 04:38 AM    RBC 2.63 08/16/2022 04:38 AM    HGB 8.7 08/16/2022 04:38 AM    HCT 26.5 08/16/2022 04:38 AM     08/16/2022 04:38 AM    .8 08/16/2022 04:38 AM    MCH 33.1 08/16/2022 04:38 AM    MCHC 32.8 08/16/2022 04:38 AM    RDW 13.2 08/16/2022 04:38 AM    LYMPHOPCT 11.8 08/16/2022 04:38 AM    MONOPCT 11.5 08/16/2022 04:38 AM    BASOPCT 0.1 08/16/2022 04:38 AM    MONOSABS 1.04 08/16/2022 04:38 AM    LYMPHSABS 1.07 08/16/2022 04:38 AM    EOSABS 0.10 08/16/2022 04:38 AM    BASOSABS 0.01 08/16/2022 04:38 AM     BMP:    Lab Results   Component Value Date/Time     08/16/2022 04:38 AM    K 4.4 08/16/2022 04:38 AM    K 4.6 08/11/2022 07:11 PM     08/16/2022 04:38 AM    CO2 26 08/16/2022 04:38 AM    BUN 19 08/16/2022 04:38 AM    LABALBU 3.0 08/14/2022 04:33 AM    CREATININE 1.1 08/16/2022 04:38 AM    CALCIUM 9.4 08/16/2022 04:38 AM    GFRAA >60 08/16/2022 04:38 AM    LABGLOM >60 08/16/2022 04:38 AM    GLUCOSE 73 08/16/2022 04:38 AM       Assessment:  Closed right basicervical femoral neck fracture status post orthopedic repair  Multiple acute strokes  Moderate to severe lumbar spinal stenosis  Postoperative anemia   CAYETANO on chronic kidney disease stage IIIa, stabilized  Hyperglycemia without documented history of diabetes   Essential hypertension  Non-oxygen dependent COPD with chronic tobacco abuse  History of gout    Plan:   I spent a great deal of time speaking with the patient this morning regarding the results of the imaging studies ordered yesterday. He was surprised to learn of his acute strokes. Antiplatelet therapy is being employed as well as high-dose statin agent. The neurology team has been asked to provide consultation. Bubble study is negative for shunting and the patient has been placed on telemetry monitoring to assess for the potential for atrial fibrillation. He continues to require substantial work with the therapy teams. Chronic comorbidities are being monitored. He is agreeable to temporary rehabilitation at [facility].    More than 50% of my time was spent at the bedside counseling/coordinating care with the patient and/or family with face to face contact. This time was spent reviewing notes and laboratory data as well as instructing and counseling the patient. Time I spent with the family or surrogate(s) is included only if the patient was incapable of providing the necessary information or participating in medical decisions. I also discussed the differential diagnosis and all of the proposed management plans with the patient and individuals accompanying the patient. I am readily available for any further decision-making and intervention.        Lakeshia York DO,   8/16/2022  5:43 AM

## 2022-08-16 NOTE — PROGRESS NOTES
Above-noted carotid Doppler studies showing severe bilateral stenosis and occlusion. Have arranged transfer to [facility]; discussed with stroke fellow.

## 2022-08-16 NOTE — DISCHARGE INSTR - COC
Continuity of Care Form    Patient Name: Kacie Gilmore   :  1950  MRN:  [id number]    [facility] date:  2022  Discharge date:  ***    Code Status Order: Full Code   Advance Directives:     Admitting Physician:  Fang Marr DO  PCP: Venkat Vazquez DO    Discharging [facility] Unit/Room#: 0315/0315-01  Discharging Unit Phone Number: [phone]    Emergency Contact:   Extended Emergency Contact Information  Primary Emergency Contact: [address] Phone: [phone]  Mobile Phone: [phone]  Relation: Other    Past Surgical History:  Past Surgical History:   Procedure Laterality Date    APPENDECTOMY      CATARACT REMOVAL WITH IMPLANT Right 2 2 15    CATARACT REMOVAL WITH IMPLANT Left 4/6/15    COLONOSCOPY  7 years ago    Dr Megan Damico Right 2022    RIGHT HIP HEMIARTHROPLASTY performed by Noelle Arana DO at [address]      child       Immunization History:   Immunization History   Administered Date(s) Administered    COVID-19, PFIZER GRAY top, DO NOT Dilute, (age 15 y+), IM, 30 mcg/0.3 mL 2022    COVID-19, PFIZER PURPLE top, DILUTE for use, (age 15 y+), 30mcg/0.3mL 2021, 2021, 11/10/2021    Tdap (Boostrix, Adacel) 2021       Active Problems:  Patient Active Problem List   Diagnosis Code    Closed right hip fracture, initial encounter ([facility] S72.001A    Hip fracture ([facility] S72.009A    Left foot drop M21.372    [facility] I63.9       Isolation/Infection:   Isolation            No Isolation          Patient Infection Status       None to display            Nurse Assessment:  Last Vital Signs: BP (!) 139/58   Pulse 80   Temp 98.7 °F (37.1 °C) (Oral)   Resp 20   Ht 5' 8\" (1.727 m)   Wt 175 lb (79.4 kg)   SpO2 95%   BMI 26.61 kg/m²     Last documented pain score (0-10 scale): Pain Level: 6  Last Weight:   Wt Readings from Last 1 Encounters:   22 175 lb (79.4 kg)     Mental Status:  oriented and alert    IV Access:  - None    Nursing Mobility/ADLs:  Walking   Assisted  Transfer  Assisted  Bathing  Assisted  Dressing  Assisted  Toileting  Assisted  Feeding  Independent  Med Admin  Assisted  Med Delivery   whole    Wound Care Documentation and Therapy:  Incision 08/13/22 Hip Right (Active)   Dressing Status Clean;Dry; Intact 08/15/22 2000   Dressing/Treatment Other (comment) 08/15/22 2000   Closure Other (Comment) 08/15/22 2000   Incision Assessment Other (Comment) 08/15/22 2000   Drainage Amount Small 08/15/22 2000   Angie-incision Assessment Other (Comment) 08/15/22 2000   Number of days: 3        Elimination:  Continence: Bowel: Yes  Bladder: Yes  Urinary Catheter: None   Colostomy/Ileostomy/Ileal Conduit: No       Date of Last BM: ***    Intake/Output Summary (Last 24 hours) at 8/16/2022 1410  Last data filed at 8/16/2022 0951  Gross per 24 hour   Intake 1044.49 ml   Output 1075 ml   Net -30.51 ml     I/O last 3 completed shifts: In: 744.5 [P.O.:480; I.V.:264.5]  Out: 1575 [Urine:1575]    Safety Concerns:     History of Falls (last 30 days)    Impairments/Disabilities:      {[facility] Impairments/Disabilities:[id number]}    Nutrition Therapy:  Current Nutrition Therapy:   - Oral Diet:  Carb Control 4 carbs/meal (1800kcals/day)    Routes of Feeding: Oral  Liquids: Thin Liquids  Daily Fluid Restriction: no  Last Modified Barium Swallow with Video (Video Swallowing Test): not done    Treatments at the Time of Hospital Discharge:   Respiratory Treatments: ***  Oxygen Therapy:  is not on home oxygen therapy.   Ventilator:    - No ventilator support    Rehab Therapies: Physical Therapy and Occupational Therapy  Weight Bearing Status/Restrictions: No weight bearing restrictions  Other Medical Equipment (for information only, NOT a DME order):  walker  Other Treatments: ***    Patient's personal belongings (please select all that are sent with patient):  {[facility] DME Belongings:[id number]}    RN SIGNATURE:  Electronically signed by Micky Goodell, RN on 8/17/22 at 7:56 AM EDT    CASE MANAGEMENT/SOCIAL WORK SECTION    Inpatient Status Date: 08/11/2022      Readmission Risk Assessment Score:  Readmission Risk              Risk of Unplanned Readmission:  11           Discharging to Facility/ Agency   Name: [facility]  Address:  Phone: [phone]  Fax: [phone]    Dialysis Facility (if applicable)   Name:  Address:  Dialysis Schedule:  Phone:  Fax:    / signature: Electronically signed by CHRISTINA Scott on 8/16/22 at 2:11 PM EDT    PHYSICIAN SECTION    Prognosis: Good    Condition at Discharge: Stable    Rehab Potential (if transferring to Rehab): Good    Recommended Labs or Other Treatments After Discharge: ***    Physician Certification: I certify the above information and transfer of Olvin Crespo  is necessary for the continuing treatment of the diagnosis listed and that he requires Acute Rehab for less 30 days.      Update Admission H&P: {[facility] DME Changes in JQWUT:[id number]}    PHYSICIAN SIGNATURE:  Electronically signed by Juan Carlos Evans DO on 8/17/22 at 7:41 AM EDT

## 2022-08-16 NOTE — CONSULTS
smoking cigarettes. He has a 60.00 pack-year smoking history. He has never used smokeless tobacco.  ETOH:   reports current alcohol use. Past Medical History:        Diagnosis Date    Diverticulitis     Gout     Hypertension     Tobacco abuse        Past Surgical History:        Procedure Laterality Date    APPENDECTOMY      CATARACT REMOVAL WITH IMPLANT Right 2 2 15    CATARACT REMOVAL WITH IMPLANT Left 4/6/15    COLONOSCOPY  7 years ago    Dr Bessy Vergara      child         Outside reports reviewed: ER records, historical medical records, lab reports and radiology reports. Patient's medications, allergies, past medical, surgical, social and family histories were reviewed and updated as appropriate. Review of Systems  A comprehensive review of systems was negative except for:       Objective:     Neuro exam 140/58 p 80 t 98  General: normal orientation and alertness. Cranial nerve testing was normal.  Funduscopic eye exam revealed not testable. Motor exam:  full except left lower extremity 2-3/5-distal weakness more prominent than proximal .  Deep tendon reflexes were absent bilaterally. Plantar responses were flexor bilaterally. Cerebellar exam noted finger to nose without dysmetria. Sensation was normal to joint position sense, light touch, and a pin prick . Erleen Wilkins Assessment:  Left lower extremity weakness multifactorial with MRI showing both biparietal small strokes and significant lumbar stenosis. Significant vascular risk factors noted: hypertension and tobacco abuse (current cigarette smoker). Rheumatoid arthritis history. Echocardiogram unremarkable  12 lead EKG preoperatively showed SR  HgbA1C normal         Plan:   If carotid us shows significant stenosis then vascular surgery evaluation.   Consider cardiology evaluation; ?loop monitor  Consider GI evaluation of anemia (expected post op finding?)  Because of anemia consider reverting DAPT to antiplatelet monotherapy to reduce risk of bleeding  PT/Rehab  Discussed with Dr Becky Paez.   Thanks for consult

## 2022-08-16 NOTE — PROGRESS NOTES
Department of Orthopedic Surgery  Resident Progress Note    Patient seen and examined. The pain in his right hip is well controlled. He is still complaining of some left foot weakness, does not notice any improvement or worsening since onset. He did have an MRI yesterday, which showed multiple small, acute strokes, however it is undetermined if these are related to his left foot weakness.   No other complaints today    VITALS:  BP (!) 139/58   Pulse 80   Temp 98.7 °F (37.1 °C) (Oral)   Resp 20   Ht 5' 8\" (1.727 m)   Wt 175 lb (79.4 kg)   SpO2 95%   BMI 26.61 kg/m²     General: alert and oriented to person, place and time, well-developed and well-nourished, in no acute distress    MUSCULOSKELETAL:   right lower extremity:  Dressing C/D/I  Compartments soft and compressible  +PF/DF/EHL  +2/4 DP & PT pulses, Brisk Cap refill, Toes warm and perfused  Distal sensation grossly intact to Peroneals, Sural, Saphenous, and tibial nrs    Left lower extremity:  Skin intact circumferentially  Compartments of the thigh and leg are soft and compressible  No TTP about the lower extremity, Tinel's negative at the proximal leg  Normal active flexion extension at the knee and hip  Decreased passive range of motion of the left ankle  Sensation light touch is grossly intact in the peroneal, sural, saphenous, tibial distribution  Patient does have some active plantarflexion and dorsiflexion of the foot today, increased from yesterday  Toes are warm and perfused, brisk capillary refill    CBC:   Lab Results   Component Value Date/Time    WBC 9.1 08/16/2022 04:38 AM    HGB 8.7 08/16/2022 04:38 AM    HCT 26.5 08/16/2022 04:38 AM     08/16/2022 04:38 AM     PT/INR:    Lab Results   Component Value Date/Time    PROTIME 10.9 08/11/2022 07:11 PM    INR 1.0 08/11/2022 07:11 PM       ASSESSMENT  S/P R HHA - 8/13/22  Left foot weakness, possibly related to MRI brain findings of multiple small acute strokes    PLAN      Continue physical therapy and protocol: WBAT - RLE  Deep venous thrombosis prophylaxis - ASA 81, early mobilization  Will order AFO for left lower extremity  PT/OT  Pain Control: PO  Monitor H&H  D/C Plan: will discuss with medicine, patient can likely still be discharged home, with plan to follow left foot symptoms.   Patient will likely be discharged to [facility] for rehabilitation, from orthopedic surgery standpoint he can go in anytime with appropriate plan in place

## 2022-08-17 VITALS
WEIGHT: 175 LBS | RESPIRATION RATE: 20 BRPM | HEIGHT: 68 IN | BODY MASS INDEX: 26.52 KG/M2 | DIASTOLIC BLOOD PRESSURE: 72 MMHG | HEART RATE: 80 BPM | SYSTOLIC BLOOD PRESSURE: 163 MMHG | TEMPERATURE: 98.5 F | OXYGEN SATURATION: 95 %

## 2022-08-17 LAB
ANION GAP SERPL CALCULATED.3IONS-SCNC: 7 MMOL/L (ref 7–16)
BASOPHILS ABSOLUTE: 0.02 E9/L (ref 0–0.2)
BASOPHILS RELATIVE PERCENT: 0.3 % (ref 0–2)
BUN BLDV-MCNC: 18 MG/DL (ref 6–23)
CALCIUM SERPL-MCNC: 9.3 MG/DL (ref 8.6–10.2)
CHLORIDE BLD-SCNC: 101 MMOL/L (ref 98–107)
CO2: 27 MMOL/L (ref 22–29)
CREAT SERPL-MCNC: 1.2 MG/DL (ref 0.7–1.2)
EKG ATRIAL RATE: 98 BPM
EKG P AXIS: 16 DEGREES
EKG P-R INTERVAL: 150 MS
EKG Q-T INTERVAL: 340 MS
EKG QRS DURATION: 74 MS
EKG QTC CALCULATION (BAZETT): 434 MS
EKG R AXIS: 29 DEGREES
EKG T AXIS: 31 DEGREES
EKG VENTRICULAR RATE: 98 BPM
EOSINOPHILS ABSOLUTE: 0.19 E9/L (ref 0.05–0.5)
EOSINOPHILS RELATIVE PERCENT: 2.5 % (ref 0–6)
GFR AFRICAN AMERICAN: >60
GFR NON-AFRICAN AMERICAN: 60 ML/MIN/1.73
GLUCOSE BLD-MCNC: 89 MG/DL (ref 74–99)
HCT VFR BLD CALC: 25.9 % (ref 37–54)
HEMOGLOBIN: 8.6 G/DL (ref 12.5–16.5)
IMMATURE GRANULOCYTES #: 0.02 E9/L
IMMATURE GRANULOCYTES %: 0.3 % (ref 0–5)
LYMPHOCYTES ABSOLUTE: 1.07 E9/L (ref 1.5–4)
LYMPHOCYTES RELATIVE PERCENT: 14 % (ref 20–42)
MCH RBC QN AUTO: 32.6 PG (ref 26–35)
MCHC RBC AUTO-ENTMCNC: 33.2 % (ref 32–34.5)
MCV RBC AUTO: 98.1 FL (ref 80–99.9)
METER GLUCOSE: 87 MG/DL (ref 74–99)
MONOCYTES ABSOLUTE: 1.02 E9/L (ref 0.1–0.95)
MONOCYTES RELATIVE PERCENT: 13.4 % (ref 2–12)
NEUTROPHILS ABSOLUTE: 5.32 E9/L (ref 1.8–7.3)
NEUTROPHILS RELATIVE PERCENT: 69.5 % (ref 43–80)
PDW BLD-RTO: 12.8 FL (ref 11.5–15)
PLATELET # BLD: 297 E9/L (ref 130–450)
PMV BLD AUTO: 9.7 FL (ref 7–12)
POTASSIUM SERPL-SCNC: 4.3 MMOL/L (ref 3.5–5)
RBC # BLD: 2.64 E12/L (ref 3.8–5.8)
SODIUM BLD-SCNC: 135 MMOL/L (ref 132–146)
WBC # BLD: 7.6 E9/L (ref 4.5–11.5)

## 2022-08-17 PROCEDURE — 94150 VITAL CAPACITY TEST: CPT

## 2022-08-17 PROCEDURE — 97530 THERAPEUTIC ACTIVITIES: CPT | Performed by: PHYSICAL THERAPIST

## 2022-08-17 PROCEDURE — 6370000000 HC RX 637 (ALT 250 FOR IP): Performed by: INTERNAL MEDICINE

## 2022-08-17 PROCEDURE — 6360000002 HC RX W HCPCS: Performed by: INTERNAL MEDICINE

## 2022-08-17 PROCEDURE — 80048 BASIC METABOLIC PNL TOTAL CA: CPT

## 2022-08-17 PROCEDURE — 94640 AIRWAY INHALATION TREATMENT: CPT

## 2022-08-17 PROCEDURE — 36415 COLL VENOUS BLD VENIPUNCTURE: CPT

## 2022-08-17 PROCEDURE — 97110 THERAPEUTIC EXERCISES: CPT | Performed by: PHYSICAL THERAPIST

## 2022-08-17 PROCEDURE — 6360000002 HC RX W HCPCS: Performed by: NURSE PRACTITIONER

## 2022-08-17 PROCEDURE — 82962 GLUCOSE BLOOD TEST: CPT

## 2022-08-17 PROCEDURE — 85025 COMPLETE CBC W/AUTO DIFF WBC: CPT

## 2022-08-17 RX ORDER — CLOPIDOGREL BISULFATE 75 MG/1
75 TABLET ORAL DAILY
Qty: 30 TABLET | Refills: 3 | DISCHARGE
Start: 2022-08-17 | End: 2022-09-28 | Stop reason: DRUGHIGH

## 2022-08-17 RX ORDER — INSULIN LISPRO 100 [IU]/ML
0-4 INJECTION, SOLUTION INTRAVENOUS; SUBCUTANEOUS NIGHTLY
DISCHARGE
Start: 2022-08-17 | End: 2022-09-28 | Stop reason: ALTCHOICE

## 2022-08-17 RX ORDER — INSULIN LISPRO 100 [IU]/ML
0-4 INJECTION, SOLUTION INTRAVENOUS; SUBCUTANEOUS
DISCHARGE
Start: 2022-08-17 | End: 2022-09-28 | Stop reason: ALTCHOICE

## 2022-08-17 RX ORDER — BUDESONIDE 0.5 MG/2ML
0.5 INHALANT ORAL 2 TIMES DAILY
Qty: 60 EACH | Refills: 3 | DISCHARGE
Start: 2022-08-17 | End: 2022-09-28 | Stop reason: ALTCHOICE

## 2022-08-17 RX ORDER — ATORVASTATIN CALCIUM 40 MG/1
40 TABLET, FILM COATED ORAL NIGHTLY
Qty: 30 TABLET | Refills: 3 | DISCHARGE
Start: 2022-08-17 | End: 2022-09-28 | Stop reason: DRUGHIGH

## 2022-08-17 RX ORDER — ALBUTEROL SULFATE 2.5 MG/3ML
2.5 SOLUTION RESPIRATORY (INHALATION) EVERY 4 HOURS PRN
Qty: 120 EACH | Refills: 3 | DISCHARGE
Start: 2022-08-17 | End: 2022-09-28 | Stop reason: ALTCHOICE

## 2022-08-17 RX ORDER — POLYETHYLENE GLYCOL 3350 17 G/17G
17 POWDER, FOR SOLUTION ORAL DAILY PRN
Qty: 527 G | Refills: 1 | DISCHARGE
Start: 2022-08-17 | End: 2022-09-16

## 2022-08-17 RX ORDER — PANTOPRAZOLE SODIUM 40 MG/1
40 TABLET, DELAYED RELEASE ORAL
Qty: 30 TABLET | Refills: 3 | Status: ON HOLD | DISCHARGE
Start: 2022-08-18 | End: 2022-10-03 | Stop reason: HOSPADM

## 2022-08-17 RX ORDER — ARFORMOTEROL TARTRATE 15 UG/2ML
15 SOLUTION RESPIRATORY (INHALATION) 2 TIMES DAILY
Qty: 120 ML | Refills: 3 | DISCHARGE
Start: 2022-08-17 | End: 2022-09-28 | Stop reason: ALTCHOICE

## 2022-08-17 RX ORDER — IPRATROPIUM BROMIDE AND ALBUTEROL SULFATE 2.5; .5 MG/3ML; MG/3ML
3 SOLUTION RESPIRATORY (INHALATION)
Qty: 360 ML | DISCHARGE
Start: 2022-08-17 | End: 2022-09-28 | Stop reason: ALTCHOICE

## 2022-08-17 RX ADMIN — BUDESONIDE 500 MCG: 0.5 SUSPENSION RESPIRATORY (INHALATION) at 06:17

## 2022-08-17 RX ADMIN — PANTOPRAZOLE SODIUM 40 MG: 40 TABLET, DELAYED RELEASE ORAL at 06:08

## 2022-08-17 RX ADMIN — MORPHINE SULFATE 2 MG: 2 INJECTION, SOLUTION INTRAMUSCULAR; INTRAVENOUS at 06:08

## 2022-08-17 RX ADMIN — HYDROXYCHLOROQUINE SULFATE 200 MG: 200 TABLET, FILM COATED ORAL at 08:11

## 2022-08-17 RX ADMIN — OXYCODONE 10 MG: 5 TABLET ORAL at 10:35

## 2022-08-17 RX ADMIN — ARFORMOTEROL TARTRATE 15 MCG: 15 SOLUTION RESPIRATORY (INHALATION) at 06:17

## 2022-08-17 RX ADMIN — IPRATROPIUM BROMIDE AND ALBUTEROL SULFATE 1 AMPULE: .5; 2.5 SOLUTION RESPIRATORY (INHALATION) at 06:16

## 2022-08-17 RX ADMIN — CLOPIDOGREL BISULFATE 75 MG: 75 TABLET ORAL at 08:11

## 2022-08-17 RX ADMIN — IPRATROPIUM BROMIDE AND ALBUTEROL SULFATE 1 AMPULE: .5; 2.5 SOLUTION RESPIRATORY (INHALATION) at 09:43

## 2022-08-17 RX ADMIN — ASPIRIN 81 MG CHEWABLE TABLET 81 MG: 81 TABLET CHEWABLE at 08:11

## 2022-08-17 ASSESSMENT — PAIN DESCRIPTION - ORIENTATION: ORIENTATION: RIGHT

## 2022-08-17 ASSESSMENT — PAIN DESCRIPTION - LOCATION: LOCATION: HIP;LEG

## 2022-08-17 ASSESSMENT — PAIN DESCRIPTION - DESCRIPTORS: DESCRIPTORS: ACHING

## 2022-08-17 ASSESSMENT — PAIN SCALES - GENERAL
PAINLEVEL_OUTOF10: 0
PAINLEVEL_OUTOF10: 8
PAINLEVEL_OUTOF10: 9

## 2022-08-17 NOTE — PLAN OF CARE
Problem: Discharge Planning  Goal: Discharge to home or other facility with appropriate resources  8/16/2022 2214 by Marcella Zaragoza RN  Outcome: Progressing     Problem: Pain  Goal: Verbalizes/displays adequate comfort level or baseline comfort level  8/16/2022 2214 by Marcella Zaragoza RN  Outcome: Progressing     Problem: Skin/Tissue Integrity  Goal: Absence of new skin breakdown  Description: 1. Monitor for areas of redness and/or skin breakdown  2. Assess vascular access sites hourly  3. Every 4-6 hours minimum:  Change oxygen saturation probe site  4. Every 4-6 hours:  If on nasal continuous positive airway pressure, respiratory therapy assess nares and determine need for appliance change or resting period.   8/16/2022 2214 by Marcella Zaragoza RN  Outcome: Progressing     Problem: ABCDS Injury Assessment  Goal: Absence of physical injury  8/16/2022 2214 by Marcella Zaragoza RN  Outcome: Progressing     Problem: Safety - Adult  Goal: Free from fall injury  8/16/2022 2214 by Marcella Zaragoza RN  Outcome: Progressing     Problem: Skin/Tissue Integrity - Adult  Goal: Incisions, wounds, or drain sites healing without S/S of infection  8/16/2022 2214 by Marcella Zaragoza RN  Outcome: Progressing     Problem: Musculoskeletal - Adult  Goal: Return mobility to safest level of function  8/16/2022 2214 by Marcella Zaragoza RN  Outcome: Progressing     Problem: Gastrointestinal - Adult  Goal: Maintains or returns to baseline bowel function  8/16/2022 2214 by Marcella Zaragoza RN  Outcome: Progressing     Problem: Infection - Adult  Goal: Absence of infection at discharge  8/16/2022 2214 by Marcella Zaragoza RN  Outcome: Progressing     Problem: Metabolic/Fluid and Electrolytes - Adult  Goal: Glucose maintained within prescribed range  8/16/2022 2214 by Marcella Zaragoza RN  Outcome: Progressing

## 2022-08-17 NOTE — PROGRESS NOTES
[facility] called and stated a bed was ready. H62-Bed 7.      Phone number for pt report is [phone]

## 2022-08-17 NOTE — PROGRESS NOTES
Department of Orthopedic Surgery  Resident Progress Note    Patient seen and examined. Patient's right hip pain is improving, and he has no numbness or tingling in the right leg. Still having left foot weakness and was found to have multiple acute strokes as well as carotid stenosis. Patient will be transferred to [facility] ([facility] for further work-up and care of his acute CVA.     VITALS:  /62   Pulse 90   Temp 98.6 °F (37 °C) (Oral)   Resp 20   Ht 5' 8\" (1.727 m)   Wt 175 lb (79.4 kg)   SpO2 93%   BMI 26.61 kg/m²     General: alert and oriented to person, place and time, well-developed and well-nourished, in no acute distress    MUSCULOSKELETAL:   right lower extremity:  Dressing C/D/I  Compartments soft and compressible  +PF/DF/EHL  +2/4 DP & PT pulses, Brisk Cap refill, Toes warm and perfused  Distal sensation grossly intact to Peroneals, Sural, Saphenous, and tibial nrs    Left lower extremity:  Skin intact circumferentially  Compartments of the thigh and leg are soft and compressible  No TTP about the lower extremity, Tinel's negative at the proximal leg  Normal active flexion extension at the knee and hip  Decreased passive range of motion of the left ankle  Sensation light touch is grossly intact in the peroneal, sural, saphenous, tibial distribution  No motor activity at the foot unable to plantarflex or dorsiflex at the ankle or toes  Toes are warm and perfused, brisk capillary refill    CBC:   Lab Results   Component Value Date/Time    WBC 7.6 08/17/2022 04:21 AM    HGB 8.6 08/17/2022 04:21 AM    HCT 25.9 08/17/2022 04:21 AM     08/17/2022 04:21 AM     PT/INR:    Lab Results   Component Value Date/Time    PROTIME 10.9 08/11/2022 07:11 PM    INR 1.0 08/11/2022 07:11 PM       ASSESSMENT  S/P R HHA - 8/13/22  Left foot weakness, possibly related to MRI brain findings of multiple small acute strokes    PLAN      Continue physical therapy and protocol: WBAT - RLE  Deep venous thrombosis prophylaxis - ASA 81, early mobilization  AFO for left lower extremity  PT/OT  Pain Control: PO  Monitor H&H  D/C Plan: Patient states that he is going to be transferred to [facility] clinic for further work-up of his stroke. From an orthopedic standpoint, the patient is okay for discharge and will follow up in the office with Dr. Sonia Burgess in 1 to 2 weeks.

## 2022-08-17 NOTE — PROGRESS NOTES
Physical Therapy  Physical Therapy Treatment Note/Plan of Care    Room #:  0315/0315-01  Patient Name: Xochitl Snyder  YOB: 1950  MRN: [id number]    Date of Service: 8/17/2022     Tentative placement recommendation: Inpatient Rehab  Equipment recommendation: To be determined and Wheeled Walker      Evaluating Physical Therapist: Steph Perez, PT, DPT #[id number]      Specific Provider Orders/Date/Referring Provider :     08/13/22 1215    PT eval and treat  Start:  08/13/22 1215,   End:  08/13/22 1215,   ONE TIME,   Standing Count:  1 Occurrences,   R         Car Pineda, DO Acknowledge New     Admitting Diagnosis:   Closed right hip fracture, initial encounter [facility]) [S72.001A]  Closed hip fracture, right, initial encounter [facility]) Alina Number      Surgery: R hip hemiarthroplasty on 8/13/22  Visit Diagnoses         Codes    S/P right hip fracture     Z87.81            Patient Active Problem List   Diagnosis    Closed right hip fracture, initial encounter ([facility] [address]    Hip fracture ([facility] [address]    Left foot drop    Stroke (cerebrum) ([facility] [address]        ASSESSMENT of Current Deficits Patient exhibits decreased strength, balance, and endurance impairing functional mobility, transfers, gait , gait distance, and tolerance to activity. Patient with AAROM on RLE and AAROM on LLE except left ankle pumps was PROM on exercises. Reviewed hip precautions. Patient able to perform transfer training with Kathyleen Linker progressing to SBA with instruction for weight shifting, scooting. Pt mildly unsteady with ambulation with instruction for safety and [employer] approximation.  Pt required AAROM for LLE LAQ as well as PROM for LLE AP.       PHYSICAL THERAPY  PLAN OF CARE       Physical therapy plan of care is established based on physician order,  patient diagnosis and clinical assessment    Current Treatment Recommendations:    -Bed Mobility: Lower extremity exercises  and Trunk control activities   -Sitting Balance: Incorporate reaching activities to activate trunk muscles , Hands on support to maintain midline , Facilitate active trunk muscle engagement , Facilitate postural control in all planes , and Engage in core activities to allow for movement within base of support   -Standing Balance: Perform strengthening exercises in standing to promote motor control with or without upper extremity support , Instruct patient on adequate base of support to maintain balance, and Challenge balance utilizing reaching  activities beyond center of gravity    -Transfers: Provide instruction on proper hand and foot position for adequate transfer of weight onto lower extremities and use of gait device if needed, Cues for hand placement, technique and safety. Provide stabilization to prevent fall , Facilitate weight shift forward on to lower extremities and provide necessary stabilization of bilateral lower extremities , Support transfer of weight on to lower extremities, and Assist with extension of knees trunk and hip to accept weight transfer   -Gait: Gait training, Standing activities to improve: base of support, weight shift, weight bearing , Exercises to improve trunk control, Exercises to improve hip and knee control, Performance of protected weight bearing activities, and Activities to increase weight bearing   -Endurance: Utilize Supervised activities to increase level of endurance to allow for safe functional mobility including transfers and gait  and Use graduated activities to promote good breathing techniques and provide support and education to maximize respiratory function  -Stairs: Stair training with instruction on proper technique and hand placement on rail    PT long term treatment goals are located in below grid    Patient and or family understand(s) diagnosis, prognosis, and plan of care. Frequency of treatments: Patient will be seen  twice daily.          Prior Level of Function: Patient ambulated independently   Rehab Potential: good  for baseline    Past medical history:   Past Medical History:   Diagnosis Date    Diverticulitis     Gout     Hypertension     Tobacco abuse      Past Surgical History:   Procedure Laterality Date    APPENDECTOMY      CATARACT REMOVAL WITH IMPLANT Right 2 2 15    CATARACT REMOVAL WITH IMPLANT Left 4/6/15    COLONOSCOPY  7 years ago    Dr Cynthia Easley Right 8/13/2022    RIGHT HIP HEMIARTHROPLASTY performed by Mai Montero DO at [address]      child       SUBJECTIVE:    Precautions: Up with assistance, falls, full weight bearing RLE, and hip precautions      MRI OF THE BRAIN WITHOUT AND WITH CONTRAST  8/15/2022 12:01 pm  1. Tiny acute infarcts are seen involving the parietal lobes bilaterally,   right more than left. A punctate acute infarct also seen in the right   occipital lobe. No mass effect or midline shift. 2. Otherwise, no acute intracranial abnormality. 3. Minimal global parenchymal volume loss with minimal chronic microvascular   ischemic changes. MRI OF THE LUMBAR SPINE WITHOUT CONTRAST, 8/15/2022 12:02 pm  1. There is a central/right paracentral disc protrusion at L4-L5, contacting   the right L5 nerve root in the lateral recess and resulting in   moderate-severe central spinal canal narrowing. 2. Moderate central spinal canal narrowing at L2-L3 and L3-L4. 3. Multilevel foraminal narrowing, as above. 4. No evidence of acute fracture. 5. The bladder is distended with urine. The patient may benefit from   catheterization if he cannot void on his own.        Social history: Patient lives alone in a ranch home  with 3 steps  to enter with Rail  Tub shower     Equipment owned: [address]   How much difficulty turning over in bed?: A Little  How much difficulty sitting down on / standing up from a chair with arms?: A Little  How much difficulty moving from lying on back to sitting on side of bed?: A Little  How much help from another person moving to and from a bed to a chair?: A Little  How much help from another person needed to walk in hospital room?: A Little  How much help from another person for climbing 3-5 steps with a railing?: A Lot  AM-PAC Inpatient Mobility Raw Score : 17  AM-PAC Inpatient T-Scale Score : 42.13  Mobility Inpatient CMS 0-100% Score: 50.57  Mobility Inpatient CMS G-Code Modifier : CK    Nursing cleared patient for PT treatment. Patient c/o R hip pain and left foot drop. OBJECTIVE;   Initial Evaluation  Date: 8/13/2022 Treatment Date:  8/17/2022     Short Term/ Long Term   Goals   Was pt agreeable to Eval/treatment?  Yes yes To be met in 2 days   Pain level   6/10  R hip 8/10 R hip with activity    Bed Mobility    Rolling: Supervision     Supine to sit: Minimal assist of 1    Sit to supine: Minimal assist of 1    Scooting: Supervision    Rolling: Supervision    Supine to sit: Supervision    Sit to supine: Supervision    Scooting: Minimal assist of 1    Rolling: Supervision     Supine to sit: Supervision     Sit to supine: Supervision     Scooting: Supervision      Transfers Sit to stand:  Min-ModA   Sit to stand: Minimal progressing to supervision       Sit to stand: Minimal assist of 1    Ambulation     25 feet using  wheeled walker with Minimal assist of 1   for walker control, balance, upright, and safety 2 x 50 feet using  wheeled walker with Minimal assist of 1  cues for sequencing, upright posture, increased base of support, increased step length, safety, and increased left hip/knee flexion to clear left foot       40 feet using  wheeled walker with Supervision     Stair negotiation: ascended and descended   Not assessed  Not assessed      3 steps with 1 HR with Mod assist   ROM Within functional limits    Increase range of motion 10% of affected joints    Strength BUE:  refer to OT eval  RLE:  3+/5  LLE:  4+/5   Left hip flex 3/5   knee extension 3-/5  Foot drop left Increase strength in affected mm groups by 1/3 grade   Balance Sitting EOB:  fair +  Dynamic Standing:  fair  Sitting EOB: good   Dynamic Standing: fair wheeled walker    Sitting EOB:  good   Dynamic Standing: fair + wheeled walker      Patient is Alert & Oriented x person, place, time, and situation and follows directions    Sensation:  Patient  denies numbness/tingling   Edema:  no   Endurance: fair     Vitals: room air   Blood Pressure at rest  Blood Pressure during session    Heart Rate at rest  Heart Rate during session    SPO2 at rest %  SPO2 during session %     Patient education  Patient educated on role of Physical Therapy, risks of immobility, safety and plan of care, energy conservation,  importance of mobility while in hospital , purse lip breathing, hip precautions, safety , stair training , weight bearing status , and seated exercises      Patient response to education:   Pt verbalized understanding Pt demonstrated skill Pt requires further education in this area   Yes Partial Yes      Treatment:  Patient practiced and was instructed/facilitated in the following treatment: Patient  performed supine exercises. Pt assisted to edge of bed, Sat edge of bed 15 minutes with Supervision  to increase dynamic sitting balance and activity tolerance. Pt performed bed mobility, transfers,ambulation in hallway, seated exercises, instruction for transfer training. Therapeutic Exercises:  ankle pumps, heel raises, long arc quad, and seated marching,  x 10-15 reps. At end of session, patient in bed with     call light and phone within reach,  all lines and tubes intact, nursing notified. Patient would benefit from continued skilled Physical Therapy to improve functional independence and quality of life.          Patient's/ family goals   rehab    Time in 850  Time out  0943    Total Treatment Time  53 minutes    CPT codes:  Therapeutic activities (25116)   38 minutes  3 unit(s)  Therapeutic exercises (18882)   15 minutes  1 unit(s)    .

## 2022-08-17 NOTE — PROGRESS NOTES
S: no new complaints  Objective:      Neuro exam 164/72 p 80 t 98  General: normal orientation and alertness. Cranial nerve testing was normal.  Funduscopic eye exam revealed not testable. Motor exam:  full except left lower extremity 2-3/5-distal weakness more prominent than proximal .  Deep tendon reflexes were absent bilaterally. Plantar responses were flexor bilaterally. Cerebellar exam noted finger to nose without dysmetria. Sensation was normal to joint position sense, light touch, and a pin prick . Ilana Remedies Assessment:  Left lower extremity weakness multifactorial with MRI showing both biparietal small strokes and significant lumbar stenosis. Significant vascular risk factors noted: hypertension and tobacco abuse (current cigarette smoker). Carotid us showing high grade GRAHAM stenosis with occluded left IC  Rheumatoid arthritis history.   Echocardiogram unremarkable  12 lead EKG preoperatively showed SR  HgbA1C normal            Plan:   DAPT  Permissive hypertension  Awaiting transfer to [facility]  Discussed with sister at bedside this am

## 2022-08-18 NOTE — DISCHARGE SUMMARY
[address]                               DISCHARGE SUMMARY    PATIENT NAME: Jesus Serrano                  :        1950  MED REC NO:   [id number]                            ROOM:       0315  ACCOUNT NO:   [de-identified]                           ADMIT DATE: 2022  PROVIDER:     Michael Freire DO                  DISCHARGE DATE:  2022    The patient was transferred to [facility]. ADMITTING DIAGNOSIS:  Status post fall. FINAL DISCHARGE DIAGNOSIS:  Closed right basicervical femoral neck  fracture status post orthopedic repair. SECONDARY DISCHARGE DIAGNOSES:  Multiple acute strokes,  moderate-to-severe lumbar spinal stenosis with left leg neuropathy,  postoperative anemia, acute-on-chronic kidney disease, hypoglycemia  without documented diabetes, essential hypertension,  non-oxygen-dependent COPD with chronic tobacco abuse, history of gout,  complications of bilateral carotid artery disease with acute CVA. OPERATION PERFORMED:  Status post right hip arthroplasty by Dr. Tete Perez on  . CONSULTATION OBTAINED:  With Dr. Analia Mark, Dr. Tete Perez. No OMT was given. ADMITTING PHYSICIAN:  Dr. Aurelia Mcgarry and Dr. Frank Lewis:  A 68-year-old white  male admitted to [address] [place] [address]. The patient presented to the  hospital here following a fall with right hip pain. The patient states  he was getting up on the toilet when his left leg gave out. The patient  states he was not able to bring himself through the next two to three  hours. The patient does admit to difficulty ambulating secondary to  right hip pain. The patient presented to the hospital, was seen, and  admitted. PAST MEDICAL HISTORY:  Positive for history of diverticulitis, gout,  essential hypertension, and history of tobacco abuse.     PHYSICAL EXAMINATION:  VITAL SIGNS:  Blood pressure 146/93, pulse 103, respirations 20,  temperature 97.8, O2 sat 94%. GENERAL APPEARANCE:  A 79-year-old white male who is alert, cooperative,  otherwise in no acute distress. HEENT:  Normal.  NECK:  Revealed bilateral carotid bruits. Trachea midline. Thyroid not  palpable. LUNGS:  Clear. HEART:  Fairly regular. Systolic ejection murmur heard in the left  upper base. ABDOMEN:  Soft. EXTREMITIES:  Without cyanosis, clubbing, or edema. Right hip pain was  present. LABORATORY DATA:  While the patient was in the hospital, he had the  following laboratory studies performed:  Hemoglobin and hematocrit 10.3  and 31.2 respectively, white count was 60,000, platelet count adequate. BUN and creatinine 35 and 1.4, respectively. HOSPITAL COURSE OF STAY:  The patient was admitted to the hospital to  the general surgical floor. The patient initially presented to the  hospital here with right hip fracture. Consultation was obtained with  Dr. Sheila Haddad regarding orthopedic evaluation. The patient underwent  further diagnostic workup at this time and was taken to surgery by Dr. Sheila Haddad on 08/13. A right hip arthroplasty was performed. The patient  tolerated the procedure well. Postoperatively, he did complain of some  left leg weakness. Further workup at this time including a CAT scan did  show evidence of multiple strokes. MRI of the brain did show evidence  of lumbar stenosis. Neurological evaluation was carried out. Further  workup included carotid ultrasound which did show evidence of high-grade  stenosis with chronic plaque. Dr. Archie Ansari felt that in view of these  findings it would be best served to transfer to a larger facility. Adjustments were made and the patient was transferred to [facility]. At  the time of discharge, the left lower extremity weakness was  multifactorial with the MRI showing severe lumbar stenosis with possible  disc impingement.   Also evidenced, the CAT scan did show small bilateral  strokes with evidence of carotid artery disease. The patient was  transferred to [facility] in stable condition by ambulance. On  the day of discharge revealed the following; his EKG did show normal  sinus rhythm. His hemoglobin and hematocrit was 8.6 x 25.9 respectively  with some postsurgical anemia. His CBC was stable. COVID test was  negative. Carotid ultrasound did show evidence of the right carotid  showing plaque 79% with some evidence of ulceration. The patient was  transferred in stable condition. Reviewing his lab work at the time of  discharge revealed the following. His blood pressure was 163/72,  temperature 98.5, pulse 80, respirations 20, O2 sat 95%, height 5 feet 8  inches, and weight 175. The patient was discharged on Proventil two  puffs q.4 p.r.n., aspirin 325 daily, atorvastatin 40  daily, Pulmicort 0.5 q.12, Plavix 75 daily, Humalog a.c. and at bedtime,  DuoNeb q.6, Percocet 10/325 q.4 p.r.n., GlycoLax 17 gm daily, and  vitamin D 50,000 units weekly. The patient will also continue Plaquenil  200 mg daily and Protonix 40 daily. The patient was instructed to  follow up with his primary care doctor upon discharge. The patient will  follow with Dr. Garland Arshad in one week. The patient is recommended to reduce  his risk factors by discontinuing smoking and following up with  rheumatological evaluation. More than 40 minutes were spent on the day of discharge with more than  50% of the time spent face-to-face with the patient along with his  sister present at the bedside reviewing planned therapy, results of  tests, and further recommendations.         Suzanne Etienne DO    D: 08/17/2022 15:03:43       T: 08/17/2022 15:09:26     [email or web address]  Job#: [id number]     Doc#: [id number]    CC:

## 2022-09-01 ENCOUNTER — TELEPHONE (OUTPATIENT)
Dept: ADMINISTRATIVE | Age: 72
End: 2022-09-01

## 2022-09-01 NOTE — TELEPHONE ENCOUNTER
Margy at [facility] [place] calling to schedule patient Surgery f/u with Dr. [facility] for right hip. Please advise scheduling.  Patient to be d/c on 9-2-2022    Eunice Hdz can be reached at [phone]

## 2022-09-07 ENCOUNTER — TELEPHONE (OUTPATIENT)
Dept: ORTHOPEDIC SURGERY | Age: 72
End: 2022-09-07

## 2022-09-07 DIAGNOSIS — Z96.641 HISTORY OF RIGHT HIP HEMIARTHROPLASTY: Primary | ICD-10-CM

## 2022-09-07 NOTE — TELEPHONE ENCOUNTER
Last appointment Visit 8/13/22  Next appointment   Future Appointments   Date Time Provider Negra Shah   9/12/2022  1:00 PM DO Ayo Garcia [facility]   1/4/2023  8:00 AM [facility]      Last refill 8/13/22  DOS: 8/13/22       Patient called in requesting refill of   Tramadol 50 mg

## 2022-09-08 RX ORDER — TRAMADOL HYDROCHLORIDE 50 MG/1
50 TABLET ORAL EVERY 6 HOURS PRN
Qty: 28 TABLET | Refills: 0 | Status: SHIPPED | OUTPATIENT
Start: 2022-09-08 | End: 2022-09-15

## 2022-09-09 DIAGNOSIS — Z96.641 HISTORY OF RIGHT HIP HEMIARTHROPLASTY: Primary | ICD-10-CM

## 2022-09-12 ENCOUNTER — OFFICE VISIT (OUTPATIENT)
Dept: ORTHOPEDIC SURGERY | Age: 72
End: 2022-09-12

## 2022-09-12 VITALS — TEMPERATURE: 98 F | WEIGHT: 175 LBS | BODY MASS INDEX: 26.52 KG/M2 | HEIGHT: 68 IN

## 2022-09-12 DIAGNOSIS — Z96.641 HISTORY OF RIGHT HIP HEMIARTHROPLASTY: Primary | ICD-10-CM

## 2022-09-12 PROCEDURE — 99024 POSTOP FOLLOW-UP VISIT: CPT | Performed by: ORTHOPAEDIC SURGERY

## 2022-09-12 RX ORDER — OXYCODONE HYDROCHLORIDE AND ACETAMINOPHEN 5; 325 MG/1; MG/1
1 TABLET ORAL EVERY 6 HOURS PRN
Qty: 28 TABLET | Refills: 0 | Status: SHIPPED | OUTPATIENT
Start: 2022-09-12 | End: 2022-09-19

## 2022-09-12 NOTE — PROGRESS NOTES
Subjective: Micki Carlin is now 2 weeks post right hip hemiarthroplasty. DOS: 8/13/22. Pain is controlled with current analgesics. Medication(s) being used: tramadol. The patient denies  fever, wound drainage, increasing redness, pus, increasing pain, increasing swelling. Post op problems reported:  increasing pain. He is ambulating with wheeled walker. Objective:      General :    alert, appears stated age, and cooperative   Gait:  Antalgic. Sutures:   Sutures out. Incision:  healing well, no significant drainage, no dehiscence, no significant erythema   Tenderness:  mild   Flexion ROM:  diminished range with pain   Extension ROM:  diminished range with pain   Abduction ROM:  diminished range with pain   DVT Evaluation:  No evidence of DVT seen on physical exam.  Negative Sandrita's sign. No cords or calf tenderness. Imaging:  Right with no signs of aseptic loosening  Radiographic findings reviewed with patient     Assessment:     Encounter Diagnosis   Name Primary? History of right hip hemiarthroplasty Yes       Plan:     He will continue with home PT and I will also order outpatient physical therapy. I will see him back  in a month  Please call office with any questions or concerns at [phone].

## 2022-09-19 ENCOUNTER — HOSPITAL ENCOUNTER (OUTPATIENT)
Age: 72
Discharge: HOME | End: 2022-09-19
Payer: MEDICARE

## 2022-09-19 LAB
ANION GAP SERPL CALCULATED.3IONS-SCNC: 16 MMOL/L (ref 7–16)
BUN BLDV-MCNC: 54 MG/DL (ref 6–23)
CALCIUM SERPL-MCNC: 9.3 MG/DL (ref 8.6–10.2)
CHLORIDE BLD-SCNC: 93 MMOL/L (ref 98–107)
CO2: 19 MMOL/L (ref 22–29)
CREAT SERPL-MCNC: 4.2 MG/DL (ref 0.7–1.2)
GFR AFRICAN AMERICAN: 17
GFR NON-AFRICAN AMERICAN: 14 ML/MIN/1.73
GLUCOSE BLD-MCNC: 109 MG/DL (ref 74–99)
POTASSIUM SERPL-SCNC: 4.7 MMOL/L (ref 3.5–5)
SODIUM BLD-SCNC: 128 MMOL/L (ref 132–146)

## 2022-09-19 PROCEDURE — 80048 BASIC METABOLIC PNL TOTAL CA: CPT

## 2022-09-19 PROCEDURE — 36415 COLL VENOUS BLD VENIPUNCTURE: CPT

## 2022-09-28 ENCOUNTER — HOSPITAL ENCOUNTER (INPATIENT)
Age: 72
LOS: 5 days | Discharge: HOME HEALTH | DRG: 811 | End: 2022-10-03
Attending: EMERGENCY MEDICINE | Admitting: INTERNAL MEDICINE
Payer: MEDICARE

## 2022-09-28 DIAGNOSIS — E83.42 HYPOMAGNESEMIA: ICD-10-CM

## 2022-09-28 DIAGNOSIS — N18.9 CHRONIC KIDNEY DISEASE, UNSPECIFIED CKD STAGE: ICD-10-CM

## 2022-09-28 DIAGNOSIS — D64.9 SYMPTOMATIC ANEMIA: ICD-10-CM

## 2022-09-28 DIAGNOSIS — D64.9 ACUTE ON CHRONIC ANEMIA: Primary | ICD-10-CM

## 2022-09-28 DIAGNOSIS — E87.1 HYPONATREMIA: ICD-10-CM

## 2022-09-28 DIAGNOSIS — R53.83 FATIGUE, UNSPECIFIED TYPE: ICD-10-CM

## 2022-09-28 PROBLEM — N17.9 ACUTE RENAL FAILURE (ARF) (HCC): Status: ACTIVE | Noted: 2022-09-28

## 2022-09-28 PROBLEM — N17.9 ACUTE RENAL FAILURE SUPERIMPOSED ON STAGE 4 CHRONIC KIDNEY DISEASE, UNSPECIFIED ACUTE RENAL FAILURE TYPE (HCC): Status: ACTIVE | Noted: 2022-09-28

## 2022-09-28 PROBLEM — N18.4 ACUTE RENAL FAILURE SUPERIMPOSED ON STAGE 4 CHRONIC KIDNEY DISEASE, UNSPECIFIED ACUTE RENAL FAILURE TYPE (HCC): Status: ACTIVE | Noted: 2022-09-28

## 2022-09-28 LAB
ABO/RH: NORMAL
ADENOVIRUS BY PCR: NOT DETECTED
ALBUMIN SERPL-MCNC: 3 G/DL (ref 3.5–5.2)
ALP BLD-CCNC: 129 U/L (ref 40–129)
ALT SERPL-CCNC: 30 U/L (ref 0–40)
ANION GAP SERPL CALCULATED.3IONS-SCNC: 15 MMOL/L (ref 7–16)
ANTIBODY SCREEN: NORMAL
AST SERPL-CCNC: 28 U/L (ref 0–39)
BACTERIA: ABNORMAL /HPF
BASOPHILS ABSOLUTE: 0.02 E9/L (ref 0–0.2)
BASOPHILS RELATIVE PERCENT: 0.1 % (ref 0–2)
BILIRUB SERPL-MCNC: 0.2 MG/DL (ref 0–1.2)
BILIRUBIN URINE: NEGATIVE
BLOOD BANK DISPENSE STATUS: NORMAL
BLOOD BANK PRODUCT CODE: NORMAL
BLOOD, URINE: ABNORMAL
BORDETELLA PARAPERTUSSIS BY PCR: NOT DETECTED
BORDETELLA PERTUSSIS BY PCR: NOT DETECTED
BPU ID: NORMAL
BUN BLDV-MCNC: 72 MG/DL (ref 6–23)
CALCIUM SERPL-MCNC: 8.8 MG/DL (ref 8.6–10.2)
CHLAMYDOPHILIA PNEUMONIAE BY PCR: NOT DETECTED
CHLORIDE BLD-SCNC: 91 MMOL/L (ref 98–107)
CHLORIDE URINE RANDOM: 32 MMOL/L
CLARITY: ABNORMAL
CO2: 21 MMOL/L (ref 22–29)
COLOR: ABNORMAL
CORONAVIRUS 229E BY PCR: NOT DETECTED
CORONAVIRUS HKU1 BY PCR: NOT DETECTED
CORONAVIRUS NL63 BY PCR: NOT DETECTED
CORONAVIRUS OC43 BY PCR: NOT DETECTED
CREAT SERPL-MCNC: 4.2 MG/DL (ref 0.7–1.2)
CREATININE URINE: 105 MG/DL (ref 40–278)
DESCRIPTION BLOOD BANK: NORMAL
EOSINOPHIL, URINE: 0 % (ref 0–1)
EOSINOPHILS ABSOLUTE: 0.09 E9/L (ref 0.05–0.5)
EOSINOPHILS RELATIVE PERCENT: 0.6 % (ref 0–6)
EPITHELIAL CELLS, UA: ABNORMAL /HPF
FERRITIN: 1324 NG/ML
FOLATE: 4.1 NG/ML (ref 4.8–24.2)
GFR AFRICAN AMERICAN: 17
GFR NON-AFRICAN AMERICAN: 14 ML/MIN/1.73
GLUCOSE BLD-MCNC: 153 MG/DL (ref 74–99)
GLUCOSE URINE: 100 MG/DL
HAPTOGLOBIN: 407 MG/DL (ref 30–200)
HCT VFR BLD CALC: 20.8 % (ref 37–54)
HCT VFR BLD CALC: 21.1 % (ref 37–54)
HEMOGLOBIN: 6.8 G/DL (ref 12.5–16.5)
HUMAN METAPNEUMOVIRUS BY PCR: NOT DETECTED
HUMAN RHINOVIRUS/ENTEROVIRUS BY PCR: NOT DETECTED
IMMATURE GRANULOCYTES #: 0.1 E9/L
IMMATURE GRANULOCYTES %: 0.7 % (ref 0–5)
IMMATURE RETIC FRACT: 14.5 % (ref 2.3–13.4)
INFLUENZA A BY PCR: NOT DETECTED
INFLUENZA B BY PCR: NOT DETECTED
IRON SATURATION: 15 % (ref 20–55)
IRON: 24 MCG/DL (ref 59–158)
KETONES, URINE: NEGATIVE MG/DL
LEUKOCYTE ESTERASE, URINE: ABNORMAL
LYMPHOCYTES ABSOLUTE: 0.82 E9/L (ref 1.5–4)
LYMPHOCYTES RELATIVE PERCENT: 5.4 % (ref 20–42)
MAGNESIUM: 1.1 MG/DL (ref 1.6–2.6)
MCH RBC QN AUTO: 30.1 PG (ref 26–35)
MCHC RBC AUTO-ENTMCNC: 32.2 % (ref 32–34.5)
MCV RBC AUTO: 93.4 FL (ref 80–99.9)
MONOCYTES ABSOLUTE: 1.46 E9/L (ref 0.1–0.95)
MONOCYTES RELATIVE PERCENT: 9.6 % (ref 2–12)
MYCOPLASMA PNEUMONIAE BY PCR: NOT DETECTED
NEUTROPHILS ABSOLUTE: 12.72 E9/L (ref 1.8–7.3)
NEUTROPHILS RELATIVE PERCENT: 83.6 % (ref 43–80)
NITRITE, URINE: NEGATIVE
OSMOLALITY URINE: 329 MOSM/KG (ref 300–900)
PARAINFLUENZA VIRUS 1 BY PCR: NOT DETECTED
PARAINFLUENZA VIRUS 2 BY PCR: NOT DETECTED
PARAINFLUENZA VIRUS 3 BY PCR: NOT DETECTED
PARAINFLUENZA VIRUS 4 BY PCR: NOT DETECTED
PDW BLD-RTO: 14.4 FL (ref 11.5–15)
PH UA: 5.5 (ref 5–9)
PHOSPHORUS: 3.3 MG/DL (ref 2.5–4.5)
PLATELET # BLD: 483 E9/L (ref 130–450)
PMV BLD AUTO: 9.9 FL (ref 7–12)
POTASSIUM REFLEX MAGNESIUM: 3.8 MMOL/L (ref 3.5–5)
PRO-BNP: 1883 PG/ML (ref 0–125)
PROCALCITONIN: 0.74 NG/ML (ref 0–0.08)
PROTEIN UA: ABNORMAL MG/DL
RBC # BLD: 2.26 E12/L (ref 3.8–5.8)
RBC UA: ABNORMAL /HPF (ref 0–2)
RESPIRATORY SYNCYTIAL VIRUS BY PCR: NOT DETECTED
RETIC HGB EQUIVALENT: 31.9 PG (ref 28.2–36.6)
RETICULOCYTE ABSOLUTE COUNT: 0.03 E12/L
RETICULOCYTE COUNT PCT: 1.2 % (ref 0.4–1.9)
SARS-COV-2, PCR: DETECTED
SODIUM BLD-SCNC: 127 MMOL/L (ref 132–146)
SODIUM URINE: 37 MMOL/L
SPECIFIC GRAVITY UA: 1.02 (ref 1–1.03)
TOTAL IRON BINDING CAPACITY: 158 MCG/DL (ref 250–450)
TOTAL PROTEIN: 7 G/DL (ref 6.4–8.3)
TROPONIN, HIGH SENSITIVITY: 62 NG/L (ref 0–11)
UREA NITROGEN, UR: 520 MG/DL (ref 800–1666)
URIC ACID, SERUM: 8.3 MG/DL (ref 3.4–7)
UROBILINOGEN, URINE: 0.2 E.U./DL
VITAMIN B-12: 593 PG/ML (ref 211–946)
WBC # BLD: 15.2 E9/L (ref 4.5–11.5)
WBC UA: ABNORMAL /HPF (ref 0–5)

## 2022-09-28 PROCEDURE — 83550 IRON BINDING TEST: CPT

## 2022-09-28 PROCEDURE — 83010 ASSAY OF HAPTOGLOBIN QUANT: CPT

## 2022-09-28 PROCEDURE — 83880 ASSAY OF NATRIURETIC PEPTIDE: CPT

## 2022-09-28 PROCEDURE — 2060000000 HC ICU INTERMEDIATE R&B

## 2022-09-28 PROCEDURE — 84300 ASSAY OF URINE SODIUM: CPT

## 2022-09-28 PROCEDURE — 51702 INSERT TEMP BLADDER CATH: CPT

## 2022-09-28 PROCEDURE — 81001 URINALYSIS AUTO W/SCOPE: CPT

## 2022-09-28 PROCEDURE — 6360000002 HC RX W HCPCS: Performed by: INTERNAL MEDICINE

## 2022-09-28 PROCEDURE — 86900 BLOOD TYPING SEROLOGIC ABO: CPT

## 2022-09-28 PROCEDURE — 86901 BLOOD TYPING SEROLOGIC RH(D): CPT

## 2022-09-28 PROCEDURE — 82570 ASSAY OF URINE CREATININE: CPT

## 2022-09-28 PROCEDURE — 84145 PROCALCITONIN (PCT): CPT

## 2022-09-28 PROCEDURE — 2580000003 HC RX 258

## 2022-09-28 PROCEDURE — 87040 BLOOD CULTURE FOR BACTERIA: CPT

## 2022-09-28 PROCEDURE — 93005 ELECTROCARDIOGRAM TRACING: CPT | Performed by: STUDENT IN AN ORGANIZED HEALTH CARE EDUCATION/TRAINING PROGRAM

## 2022-09-28 PROCEDURE — 6360000002 HC RX W HCPCS: Performed by: EMERGENCY MEDICINE

## 2022-09-28 PROCEDURE — 83735 ASSAY OF MAGNESIUM: CPT

## 2022-09-28 PROCEDURE — 84540 ASSAY OF URINE/UREA-N: CPT

## 2022-09-28 PROCEDURE — 80053 COMPREHEN METABOLIC PANEL: CPT

## 2022-09-28 PROCEDURE — 82746 ASSAY OF FOLIC ACID SERUM: CPT

## 2022-09-28 PROCEDURE — 94640 AIRWAY INHALATION TREATMENT: CPT

## 2022-09-28 PROCEDURE — 2580000003 HC RX 258: Performed by: INTERNAL MEDICINE

## 2022-09-28 PROCEDURE — 1200000000 HC SEMI PRIVATE

## 2022-09-28 PROCEDURE — 6370000000 HC RX 637 (ALT 250 FOR IP): Performed by: INTERNAL MEDICINE

## 2022-09-28 PROCEDURE — 0202U NFCT DS 22 TRGT SARS-COV-2: CPT

## 2022-09-28 PROCEDURE — 82607 VITAMIN B-12: CPT

## 2022-09-28 PROCEDURE — 87205 SMEAR GRAM STAIN: CPT

## 2022-09-28 PROCEDURE — 80074 ACUTE HEPATITIS PANEL: CPT

## 2022-09-28 PROCEDURE — 83935 ASSAY OF URINE OSMOLALITY: CPT

## 2022-09-28 PROCEDURE — 84100 ASSAY OF PHOSPHORUS: CPT

## 2022-09-28 PROCEDURE — 84484 ASSAY OF TROPONIN QUANT: CPT

## 2022-09-28 PROCEDURE — 99285 EMERGENCY DEPT VISIT HI MDM: CPT

## 2022-09-28 PROCEDURE — 82728 ASSAY OF FERRITIN: CPT

## 2022-09-28 PROCEDURE — 82436 ASSAY OF URINE CHLORIDE: CPT

## 2022-09-28 PROCEDURE — 85025 COMPLETE CBC W/AUTO DIFF WBC: CPT

## 2022-09-28 PROCEDURE — 85045 AUTOMATED RETICULOCYTE COUNT: CPT

## 2022-09-28 PROCEDURE — P9016 RBC LEUKOCYTES REDUCED: HCPCS

## 2022-09-28 PROCEDURE — 86850 RBC ANTIBODY SCREEN: CPT

## 2022-09-28 PROCEDURE — 83540 ASSAY OF IRON: CPT

## 2022-09-28 PROCEDURE — 86923 COMPATIBILITY TEST ELECTRIC: CPT

## 2022-09-28 PROCEDURE — 84550 ASSAY OF BLOOD/URIC ACID: CPT

## 2022-09-28 RX ORDER — SODIUM CHLORIDE 9 MG/ML
INJECTION, SOLUTION INTRAVENOUS PRN
Status: COMPLETED | OUTPATIENT
Start: 2022-09-28 | End: 2022-09-28

## 2022-09-28 RX ORDER — BUDESONIDE 0.5 MG/2ML
0.5 INHALANT ORAL 2 TIMES DAILY
Status: DISCONTINUED | OUTPATIENT
Start: 2022-09-28 | End: 2022-10-03 | Stop reason: HOSPADM

## 2022-09-28 RX ORDER — ATORVASTATIN CALCIUM 80 MG/1
80 TABLET, FILM COATED ORAL
COMMUNITY

## 2022-09-28 RX ORDER — SODIUM CHLORIDE 0.9 % (FLUSH) 0.9 %
5-40 SYRINGE (ML) INJECTION EVERY 12 HOURS SCHEDULED
Status: DISCONTINUED | OUTPATIENT
Start: 2022-09-28 | End: 2022-10-03 | Stop reason: HOSPADM

## 2022-09-28 RX ORDER — SODIUM CHLORIDE 9 MG/ML
INJECTION, SOLUTION INTRAVENOUS
Status: COMPLETED
Start: 2022-09-28 | End: 2022-09-28

## 2022-09-28 RX ORDER — ONDANSETRON 4 MG/1
4 TABLET, ORALLY DISINTEGRATING ORAL EVERY 8 HOURS PRN
Status: DISCONTINUED | OUTPATIENT
Start: 2022-09-28 | End: 2022-10-03 | Stop reason: HOSPADM

## 2022-09-28 RX ORDER — HYDROXYCHLOROQUINE SULFATE 200 MG/1
200 TABLET, FILM COATED ORAL DAILY
Status: DISCONTINUED | OUTPATIENT
Start: 2022-09-28 | End: 2022-10-03 | Stop reason: HOSPADM

## 2022-09-28 RX ORDER — ASPIRIN 81 MG/1
81 TABLET, CHEWABLE ORAL DAILY
Status: DISCONTINUED | OUTPATIENT
Start: 2022-09-28 | End: 2022-10-03 | Stop reason: HOSPADM

## 2022-09-28 RX ORDER — ACETAMINOPHEN 325 MG/1
650 TABLET ORAL EVERY 6 HOURS PRN
Status: DISCONTINUED | OUTPATIENT
Start: 2022-09-28 | End: 2022-10-03 | Stop reason: HOSPADM

## 2022-09-28 RX ORDER — VITAMIN B COMPLEX
2000 TABLET ORAL DAILY
Status: DISCONTINUED | OUTPATIENT
Start: 2022-09-28 | End: 2022-10-03 | Stop reason: HOSPADM

## 2022-09-28 RX ORDER — ALBUTEROL SULFATE 2.5 MG/3ML
2.5 SOLUTION RESPIRATORY (INHALATION) EVERY 4 HOURS PRN
Status: DISCONTINUED | OUTPATIENT
Start: 2022-09-28 | End: 2022-10-03 | Stop reason: HOSPADM

## 2022-09-28 RX ORDER — SODIUM CHLORIDE 0.9 % (FLUSH) 0.9 %
5-40 SYRINGE (ML) INJECTION PRN
Status: DISCONTINUED | OUTPATIENT
Start: 2022-09-28 | End: 2022-10-03 | Stop reason: HOSPADM

## 2022-09-28 RX ORDER — CLOPIDOGREL BISULFATE 75 MG/1
75 TABLET ORAL DAILY
Status: DISCONTINUED | OUTPATIENT
Start: 2022-09-28 | End: 2022-10-03 | Stop reason: HOSPADM

## 2022-09-28 RX ORDER — POLYETHYLENE GLYCOL 3350 17 G/17G
17 POWDER, FOR SOLUTION ORAL DAILY PRN
Status: DISCONTINUED | OUTPATIENT
Start: 2022-09-28 | End: 2022-10-01

## 2022-09-28 RX ORDER — ASPIRIN 325 MG
325 TABLET ORAL EVERY MORNING
Status: ON HOLD | COMMUNITY
End: 2022-10-03 | Stop reason: HOSPADM

## 2022-09-28 RX ORDER — SODIUM BICARBONATE 650 MG/1
1300 TABLET ORAL 2 TIMES DAILY
Status: ON HOLD | COMMUNITY
End: 2022-10-03 | Stop reason: HOSPADM

## 2022-09-28 RX ORDER — SODIUM CHLORIDE 9 MG/ML
INJECTION, SOLUTION INTRAVENOUS PRN
Status: DISCONTINUED | OUTPATIENT
Start: 2022-09-28 | End: 2022-10-03 | Stop reason: HOSPADM

## 2022-09-28 RX ORDER — 0.9 % SODIUM CHLORIDE 0.9 %
500 INTRAVENOUS SOLUTION INTRAVENOUS ONCE
Status: COMPLETED | OUTPATIENT
Start: 2022-09-28 | End: 2022-09-28

## 2022-09-28 RX ORDER — ACETAMINOPHEN 650 MG/1
650 SUPPOSITORY RECTAL EVERY 6 HOURS PRN
Status: DISCONTINUED | OUTPATIENT
Start: 2022-09-28 | End: 2022-10-03 | Stop reason: HOSPADM

## 2022-09-28 RX ORDER — MAGNESIUM SULFATE IN WATER 40 MG/ML
2000 INJECTION, SOLUTION INTRAVENOUS ONCE
Status: COMPLETED | OUTPATIENT
Start: 2022-09-28 | End: 2022-09-28

## 2022-09-28 RX ORDER — ATORVASTATIN CALCIUM 40 MG/1
80 TABLET, FILM COATED ORAL NIGHTLY
Status: DISCONTINUED | OUTPATIENT
Start: 2022-09-28 | End: 2022-10-03 | Stop reason: HOSPADM

## 2022-09-28 RX ORDER — AMMONIUM LACTATE 12 G/100G
1 LOTION TOPICAL 2 TIMES DAILY
COMMUNITY

## 2022-09-28 RX ORDER — CLOPIDOGREL BISULFATE 75 MG/1
75 TABLET ORAL EVERY MORNING
COMMUNITY

## 2022-09-28 RX ORDER — ARFORMOTEROL TARTRATE 15 UG/2ML
15 SOLUTION RESPIRATORY (INHALATION) 2 TIMES DAILY
Status: DISCONTINUED | OUTPATIENT
Start: 2022-09-28 | End: 2022-10-03 | Stop reason: HOSPADM

## 2022-09-28 RX ORDER — ONDANSETRON 2 MG/ML
4 INJECTION INTRAMUSCULAR; INTRAVENOUS EVERY 6 HOURS PRN
Status: DISCONTINUED | OUTPATIENT
Start: 2022-09-28 | End: 2022-10-03 | Stop reason: HOSPADM

## 2022-09-28 RX ORDER — ALLOPURINOL 100 MG/1
100 TABLET ORAL EVERY MORNING
COMMUNITY

## 2022-09-28 RX ORDER — OXYCODONE HYDROCHLORIDE AND ACETAMINOPHEN 5; 325 MG/1; MG/1
1 TABLET ORAL EVERY 6 HOURS PRN
COMMUNITY

## 2022-09-28 RX ADMIN — BUDESONIDE 500 MCG: 0.5 SUSPENSION RESPIRATORY (INHALATION) at 19:57

## 2022-09-28 RX ADMIN — Medication 10 ML: at 21:06

## 2022-09-28 RX ADMIN — SODIUM CHLORIDE 500 ML: 9 INJECTION, SOLUTION INTRAVENOUS at 16:05

## 2022-09-28 RX ADMIN — ATORVASTATIN CALCIUM 80 MG: 40 TABLET, FILM COATED ORAL at 21:05

## 2022-09-28 RX ADMIN — MAGNESIUM SULFATE HEPTAHYDRATE 2000 MG: 40 INJECTION, SOLUTION INTRAVENOUS at 16:08

## 2022-09-28 RX ADMIN — Medication 2000 UNITS: at 16:04

## 2022-09-28 RX ADMIN — ARFORMOTEROL TARTRATE 15 MCG: 15 SOLUTION RESPIRATORY (INHALATION) at 19:57

## 2022-09-28 RX ADMIN — Medication 500 ML: at 16:05

## 2022-09-28 RX ADMIN — SODIUM CHLORIDE: 9 INJECTION, SOLUTION INTRAVENOUS at 15:33

## 2022-09-28 RX ADMIN — ASPIRIN 81 MG CHEWABLE TABLET 81 MG: 81 TABLET CHEWABLE at 16:04

## 2022-09-28 ASSESSMENT — PAIN DESCRIPTION - LOCATION: LOCATION: GROIN

## 2022-09-28 ASSESSMENT — ENCOUNTER SYMPTOMS
SORE THROAT: 0
ABDOMINAL PAIN: 0
COUGH: 0
BACK PAIN: 0
NAUSEA: 0
SHORTNESS OF BREATH: 0
SINUS PRESSURE: 0
DIARRHEA: 1
EYE REDNESS: 0
EYE PAIN: 0
VOMITING: 0
EYE DISCHARGE: 0
WHEEZING: 0

## 2022-09-28 ASSESSMENT — PAIN DESCRIPTION - PAIN TYPE: TYPE: ACUTE PAIN

## 2022-09-28 ASSESSMENT — PAIN DESCRIPTION - FREQUENCY: FREQUENCY: CONTINUOUS

## 2022-09-28 ASSESSMENT — PAIN SCALES - GENERAL: PAINLEVEL_OUTOF10: 5

## 2022-09-28 ASSESSMENT — PAIN DESCRIPTION - DESCRIPTORS: DESCRIPTORS: ACHING

## 2022-09-28 ASSESSMENT — PAIN DESCRIPTION - ORIENTATION: ORIENTATION: MID

## 2022-09-28 ASSESSMENT — PAIN - FUNCTIONAL ASSESSMENT: PAIN_FUNCTIONAL_ASSESSMENT: PREVENTS OR INTERFERES SOME ACTIVE ACTIVITIES AND ADLS

## 2022-09-28 ASSESSMENT — PAIN DESCRIPTION - ONSET: ONSET: ON-GOING

## 2022-09-28 NOTE — ED NOTES
Pt. C/o fatigue, states their doctor told them to come to the hospital for low Hgb.      Sagar Melchor, RN  09/28/22 4228

## 2022-09-28 NOTE — H&P
Department of Internal Medicine  History and Physical Examination     Primary Care Physician: Shireen Solomon DO   Admitting Physician: Dr. Sriram Kan. Admission date and time: 9/28/2022 12:09 PM    Room:  15/15  Admitting diagnosis: Acute on chronic renal failure, anemia. Patient Name: Robi Shine  MRN: [id number]    Date of Service: 9/28/2022     Chief Complaint: Fatigue    HISTORY OF PRESENT ILLNESS:    Robi Shine is 79-year-old male patient presented to [address] [place] [address] for evaluation at the request of his nephrologist.  He was evaluated by the nephrologist and had labs drawn yesterday. Multiple laboratory abnormalities were noted, including worsening renal failure and anemia. He was evaluated in the ER where repeat labs, type and screen were ordered. These results are pending. Case was discussed with ER physician with plan for admission, further care and management under the service of Dr. Brandin Dykes. Patient is seen and examined at bedside with family present. We have reviewed the above given history. He is somewhat of a poor historian and family assists to a certain degree. He states that he was feeling more fatigued but essentially otherwise at his baseline. Abnormal labs were called in with recommendation to go to ER for admission. He denies any other acute symptoms. No fevers or chills, known sick contact or exposures. No headache or acute neurological symptoms superimposed on his chronic left-sided weakness and relatively new foot drop in the setting of recent stroke. He underwent carotid procedure in [place] following his last hospitalization here where he was treated for hip fracture. Denies any chest pain, palpitations or fluttering. Denies any shortness of breath, cough or phlegm production. Exertional dyspnea is present to a certain degree but is not worse than usual.  No abdominal pain, nausea, vomiting, bowel changes, hematemesis, hematochezia or melena.   He denies any changes in urinary frequency, volume or any hematuria, suprapubic or flank discomfort. PAST MEDICAL Hx:  Past Medical History:   Diagnosis Date    Diverticulitis     Gout     Hypertension     Tobacco abuse        PAST SURGICAL Hx:   Past Surgical History:   Procedure Laterality Date    APPENDECTOMY      CATARACT REMOVAL WITH IMPLANT Right 2 2 15    CATARACT REMOVAL WITH IMPLANT Left 4/6/15    COLONOSCOPY  7 years ago    Dr Jeannine Lora Right 8/13/2022    RIGHT HIP HEMIARTHROPLASTY performed by Juan Carlos Gunn DO at [address]      child       FAMILY Hx:  Family History   Problem Relation Age of Onset    COPD Mother     Diabetes Mother     Diabetes Father        HOME MEDICATIONS:  Prior to Admission medications    Medication Sig Start Date End Date Taking?  Authorizing Provider   pantoprazole (PROTONIX) 40 MG tablet Take 1 tablet by mouth every morning (before breakfast) 8/18/22   Prince Fay DO   albuterol (PROVENTIL) (2.5 MG/3ML) 0.083% nebulizer solution Take 3 mLs by nebulization every 4 hours as needed for Wheezing or Shortness of Breath 8/17/22   Prince Fay DO   Arformoterol Tartrate (BROVANA) 15 MCG/2ML NEBU Take 2 mLs by nebulization 2 times daily 8/17/22   Prince Fay DO   budesonide (PULMICORT) 0.5 MG/2ML nebulizer suspension Take 2 mLs by nebulization 2 times daily 8/17/22   Prince Fay DO   atorvastatin (LIPITOR) 40 MG tablet Take 1 tablet by mouth nightly 8/17/22   Prince Fay DO   clopidogrel (PLAVIX) 75 MG tablet Take 1 tablet by mouth daily 8/17/22   Komal Fay,    insulin lispro (HUMALOG) 100 UNIT/ML SOLN injection vial Inject 0-4 Units into the skin 3 times daily (with meals) 8/17/22   Prince Fay DO   insulin lispro (HUMALOG) 100 UNIT/ML SOLN injection vial Inject 0-4 Units into the skin nightly 8/17/22   Maxi Fay DO   ipratropium-albuterol (DUONEB) 0.5-2.5 (3) MG/3ML SOLN nebulizer solution Inhale 3 mLs into the lungs every 4 hours (while awake) 8/17/22   Boone Fay, DO   aspirin 325 MG EC tablet Take 1 tablet by mouth in the morning and 1 tablet before bedtime. 8/13/22 9/12/22  Vito Sicard, DO   hydroxychloroquine (PLAQUENIL) 200 MG tablet Take 200 mg by mouth in the morning. Historical Provider, MD   Cholecalciferol (VITAMIN D) 2000 UNITS CAPS capsule Take 1 capsule by mouth daily    Historical Provider, MD       ALLERGIES:  Patient has no known allergies. SOCIAL Hx:  Social History     Socioeconomic History    Marital status:      Spouse name: Not on file    Number of children: Not on file    Years of education: Not on file    Highest education level: Not on file   Occupational History    Not on file   Tobacco Use    Smoking status: Former     Packs/day: 1.50     Years: 40.00     Pack years: 60.00     Types: Cigarettes    Smokeless tobacco: Never   Vaping Use    Vaping Use: Never used   Substance and Sexual Activity    Alcohol use: Yes     Comment: beer daily    Drug use: No    Sexual activity: Not on file   Other Topics Concern    Not on file   Social History Narrative    Not on file     Social Determinants of Health     Financial Resource Strain: Not on file   Food Insecurity: Not on file   Transportation Needs: Not on file   Physical Activity: Not on file   Stress: Not on file   Social Connections: Not on file   Intimate Partner Violence: Not on file   Housing Stability: Not on file       ROS: 12 point review of symptoms was conducted, pertinent positives and negative were reviewed, aside from that all 12 systems were reviewed and negative. PHYSICAL EXAM:  VITALS:  Vitals:    09/28/22 1207   BP: (!) 126/93   Pulse:    Resp: 16   Temp:    SpO2:          CONSTITUTIONAL:    Awake, alert, cooperative, acute on chronic ill appearance, no apparent distress    EYES:    PERRL, EOMI, sclera clear without icterus, conjunctiva pale    ENT:    Normocephalic, atraumatic, sinuses nontender on palpation.  External ears without lesions. Oral pharynx with moist mucus membranes. NECK:    Supple, symmetrical, trachea midline, no adenopathy, thyroid symmetric, not enlarged and no tenderness, skin normal, no bruits, no JVD    HEMATOLOGIC/LYMPHATICS:    No cervical lymphadenopathy and no supraclavicular lymphadenopathy    LUNGS:    Symmetric. No increased work of breathing, diminished bibasilar air exchange, clear to auscultation bilaterally, no wheezes, rhonchi, or rales,     CARDIOVASCULAR:    Normal apical impulse, regular rate and rhythm, normal S1 and S2, systolic murmur noted    ABDOMEN:    Slightly rounded due to adiposity with otherwise normal contour, normal bowel sounds, soft, non-distended, non-tender, no rebound or guarding elicited on palpation     MUSCULOSKELETAL:    There is no redness, warmth, or swelling of the joints. Tone is normal.    NEUROLOGIC:    Awake, alert, oriented to name, place and time. Cranial nerves II-XII are grossly intact. Generalized weakness superimposed on chronic left-sided weakness with no new/acute deficits. SKIN:    No bruising or bleeding. No redness, warmth, or swelling    EXTREMITIES:    Peripheral pulses present. No significant pitting.   LABORATORY DATA:  Labs are ordered and pending  Care everywhere reviewed regarding labs drawn in the nephrologist office yesterday with multiple abnormalities    ASSESSMENT:  Acute on chronic renal failure stage IV-V, with multiple electrolyte disturbance and acidosis noted  Acute on chronic anemia without overt blood loss identified  Cerebrovascular disease with relatively recent multifocal CVA, chronic residual left-sided deficits   Central vascular disease with total occlusion of the left ICA and has been of a carotid stent in the severely stenosed right internal carotid artery August 18, 2022 high-dose aspirin and Plavix therapy (Plavix end date 10/2/2022)  Hyperlipidemia  Hyperglycemia without documented history of diabetes, hemoglobin A1c 5.1%  Nonoxygen dependent COPD with chronic tobacco abuse  Hyperuricemia with history of gout on chronic allopurinol therapy  Recent hospitalization for right basicervical femoral neck fracture status post orthopedic repair 08/2022  Moderate to severe lumbar spinal stenosis  Essential hypertension    PLAN:  Kayleigh Guerin is a 82-year-old male patient presented to [address] [place] [address] with fatigue and abnormal labs. Labs yesterday drawn by the nephrologist revealed multiple issues including anemia, acute on chronic renal failure, multiple electrolyte disturbance. Patient states he was started on sodium bicarbonate at that visit and was advised to come to the hospital for consideration of admission today. Labs at this point pending. We will confirm these abnormalities and address accordingly. We discussed blood transfusion and he would be agreeable with this, they believe that he has had transfusions in the past.  Further renal recommendations as per the nephrology team.  At this point, we must at least consider the potential for renal replacement therapy. Multiple potentially nephrotoxic medications will be held. Regards to anemia, he denies overt bleeding and there is likely a component of anemia of chronic disease with his underlying renal dysfunction. Further complicating matters is his high-dose aspirin and Plavix therapy for recent vascular procedure. I reviewed the [facility] from [facility] [employer] [facility] facility, his Plavix is to be stopped on October 2. We may need to consider a slightly early discontinuation of this moving forward. We will monitor hemoglobin hematocrit, stool for occult blood, and anemia evaluation will be undertaken and corrected accordingly. PT, OT and social work will follow for discharge planning purposes. Underlying co-morbidites will be addressed during hospitalization as well.   Tobacco cessation will be extremely important in the patient's care moving forward given his severe central vascular disease. Labs and vital signs will be monitored closely and addressed accordingly. See additional orders for details.        VLADIMIR Cheng CNP  1:31 PM  9/28/2022    Electronically signed by VLADIMIR Cheng CNP on 9/28/22 at 1:31 PM EDT

## 2022-09-28 NOTE — ACP (ADVANCE CARE PLANNING)
Advance Care Planning   Healthcare Decision Maker:    Primary Decision Maker: Laura Hoang Child - [phone]    Click here to complete Healthcare Decision Makers including selection of the Healthcare Decision Maker Relationship (ie \"Primary\"). Today we documented Decision Maker(s) consistent with Legal Next of Kin hierarchy. Pt did note he has 4 children and he has POA @ home naming his son Ibeth Quiroz as agent. SW stressed importance to have copy brought into hospital so it can honored. He said he has LW too but did not want to talk any further about this. He is feeling quite sick.    Electronically signed by CHRISTINA Moreno on 9/28/2022 at 3:01 PM

## 2022-09-28 NOTE — ED PROVIDER NOTES
Department of Emergency Medicine   ED  Provider Note  Admit Date/RoomTime: 9/28/2022 12:09 PM  ED Room: 15/15          History of Present Illness:  9/28/22, Time: 12:29 PM EDT  Chief Complaint   Patient presents with    Fatigue     Received call that his hemoglobin is 6.3 and kidneys are being affected. Told to come to ED            Elyse Watts is a 81 y.o. male presenting to the ED for abnormal lab results, beginning today. The complaint has been constant, severe in severity, and worsened by nothing. Patient was told to come to the ED due to having a Hgb of 6.6 and a Cr of 4.7. He states they also told him he was hyponatremic. Patient states he has no complaints at this time. They mention that he was recently admitted for a fractured hip which was repaired however during his stay he had a stroke and had to have a carotid stent placed. Patient has been on plavix and aspirin since then. Review of Systems   Constitutional:  Negative for chills and fever. HENT:  Negative for ear pain, sinus pressure and sore throat. Eyes:  Negative for pain, discharge and redness. Respiratory:  Negative for cough, shortness of breath and wheezing. Cardiovascular:  Negative for chest pain. Gastrointestinal:  Positive for diarrhea. Negative for abdominal pain, nausea and vomiting. Genitourinary:  Negative for dysuria and frequency. Musculoskeletal:  Negative for arthralgias and back pain. Skin:  Negative for rash and wound. Neurological:  Negative for weakness and headaches. Hematological:  Negative for adenopathy. All other systems reviewed and are negative.       --------------------------------------------- PAST HISTORY ---------------------------------------------  Past Medical History:  has a past medical history of Diverticulitis, Gout, Hypertension, and Tobacco abuse. Past Surgical History:  has a past surgical history that includes Colonoscopy (7 years ago); Appendectomy;  Nasal fracture surgery; Cataract removal with implant (Right, 2 2 15); Cataract removal with implant (Left, 4/6/15); and hip surgery (Right, 8/13/2022). Social History:  reports that he has quit smoking. His smoking use included cigarettes. He has a 60.00 pack-year smoking history. He has never used smokeless tobacco. He reports current alcohol use. He reports that he does not use drugs. Family History: family history includes COPD in his mother; Diabetes in his father and mother. . Unless otherwise noted, family history is non contributory    The patients home medications have been reviewed. Allergies: Patient has no known allergies. ---------------------------------------------------PHYSICAL EXAM--------------------------------------    Physical Exam  Vitals and nursing note reviewed. Constitutional:       Appearance: He is well-developed. HENT:      Head: Normocephalic and atraumatic. Eyes:      Conjunctiva/sclera: Conjunctivae normal.   Cardiovascular:      Rate and Rhythm: Normal rate and regular rhythm. Heart sounds: Normal heart sounds. No murmur heard. Pulmonary:      Effort: Pulmonary effort is normal. No respiratory distress. Breath sounds: Normal breath sounds. No wheezing or rales. Abdominal:      General: Bowel sounds are normal.      Palpations: Abdomen is soft. Tenderness: There is no abdominal tenderness. There is no guarding or rebound. Genitourinary:     Rectum: Guaiac result negative. Musculoskeletal:         General: No tenderness or deformity. Cervical back: Normal range of motion and neck supple. Skin:     General: Skin is warm and dry. Neurological:      Mental Status: He is alert and oriented to person, place, and time. Cranial Nerves: No cranial nerve deficit.       Coordination: Coordination normal.          -------------------------------------------------- RESULTS -------------------------------------------------  I have personally reviewed all laboratory and imaging results for this patient. Results are listed below.      LABS: (Lab results interpreted by me)  Results for orders placed or performed during the hospital encounter of 09/28/22   CBC with Auto Differential   Result Value Ref Range    WBC 15.2 (H) 4.5 - 11.5 E9/L    RBC 2.26 (L) 3.80 - 5.80 E12/L    Hemoglobin 6.8 (LL) 12.5 - 16.5 g/dL    Hematocrit 21.1 (L) 37.0 - 54.0 %    MCV 93.4 80.0 - 99.9 fL    MCH 30.1 26.0 - 35.0 pg    MCHC 32.2 32.0 - 34.5 %    RDW 14.4 11.5 - 15.0 fL    Platelets 373 (H) 373 - 450 E9/L    MPV 9.9 7.0 - 12.0 fL    Neutrophils % 83.6 (H) 43.0 - 80.0 %    Immature Granulocytes % 0.7 0.0 - 5.0 %    Lymphocytes % 5.4 (L) 20.0 - 42.0 %    Monocytes % 9.6 2.0 - 12.0 %    Eosinophils % 0.6 0.0 - 6.0 %    Basophils % 0.1 0.0 - 2.0 %    Neutrophils Absolute 12.72 (H) 1.80 - 7.30 E9/L    Immature Granulocytes # 0.10 E9/L    Lymphocytes Absolute 0.82 (L) 1.50 - 4.00 E9/L    Monocytes Absolute 1.46 (H) 0.10 - 0.95 E9/L    Eosinophils Absolute 0.09 0.05 - 0.50 E9/L    Basophils Absolute 0.02 0.00 - 0.20 E9/L   Comprehensive Metabolic Panel w/ Reflex to MG   Result Value Ref Range    Sodium 127 (L) 132 - 146 mmol/L    Potassium reflex Magnesium 3.8 3.5 - 5.0 mmol/L    Chloride 91 (L) 98 - 107 mmol/L    CO2 21 (L) 22 - 29 mmol/L    Anion Gap 15 7 - 16 mmol/L    Glucose 153 (H) 74 - 99 mg/dL    BUN 72 (H) 6 - 23 mg/dL    Creatinine 4.2 (H) 0.7 - 1.2 mg/dL    GFR Non-African American 14 >=60 mL/min/1.73    GFR African American 17     Calcium 8.8 8.6 - 10.2 mg/dL    Total Protein 7.0 6.4 - 8.3 g/dL    Albumin 3.0 (L) 3.5 - 5.2 g/dL    Total Bilirubin 0.2 0.0 - 1.2 mg/dL    Alkaline Phosphatase 129 40 - 129 U/L    ALT 30 0 - 40 U/L    AST 28 0 - 39 U/L   Troponin   Result Value Ref Range    Troponin, High Sensitivity 62 (H) 0 - 11 ng/L   Brain Natriuretic Peptide   Result Value Ref Range    Pro-BNP 1,883 (H) 0 - 125 pg/mL   Magnesium   Result Value Ref Range    Magnesium 1.1 (L) 1.6 - 2.6 mg/dL   Phosphorus   Result Value Ref Range    Phosphorus 3.3 2.5 - 4.5 mg/dL   Urinalysis with Microscopic   Result Value Ref Range    Color, UA Straw Straw/Yellow    Clarity, UA SLCLOUDY Clear    Glucose, Ur 100 (A) Negative mg/dL    Bilirubin Urine Negative Negative    Ketones, Urine Negative Negative mg/dL    Specific Gravity, UA 1.020 1.005 - 1.030    Blood, Urine TRACE (A) Negative    pH, UA 5.5 5.0 - 9.0    Protein, UA TRACE Negative mg/dL    Urobilinogen, Urine 0.2 <2.0 E.U./dL    Nitrite, Urine Negative Negative    Leukocyte Esterase, Urine SMALL (A) Negative    WBC, UA 10-20 (A) 0 - 5 /HPF    RBC, UA 1-3 0 - 2 /HPF    Epithelial Cells, UA FEW /HPF    Bacteria, UA MODERATE (A) None Seen /HPF   Reticulocytes   Result Value Ref Range    Retic Ct Pct 1.2 0.4 - 1.9 %    Retic Ct Abs 0.027 E12/L    Immature Retic Fract 14.5 (H) 2.3 - 13.4 %    Hematocrit 20.8 (L) 37.0 - 54.0 %    Retic HGB Equivalent 31.9 28.2 - 36.6 pg   Uric Acid   Result Value Ref Range    Uric Acid, Serum 8.3 (H) 3.4 - 7.0 mg/dL   EKG 12 Lead   Result Value Ref Range    Ventricular Rate 102 BPM    Atrial Rate 102 BPM    P-R Interval 194 ms    QRS Duration 84 ms    Q-T Interval 352 ms    QTc Calculation (Bazett) 458 ms    P Axis 70 degrees    R Axis 39 degrees    T Axis 8 degrees   TYPE AND SCREEN   Result Value Ref Range    ABO/Rh O NEG     Antibody Screen NEG    PREPARE RBC (CROSSMATCH), 1 Units   Result Value Ref Range    Product Code Blood Bank [id number]     Description Blood Bank Red Blood Cells, Apheresis, Leuko-reduced     Unit Number [id number]     Dispense Status Blood Bank issued    ,       RADIOLOGY:  Interpreted by Radiologist unless otherwise specified  No orders to display                    ------------------------- NURSING NOTES AND VITALS REVIEWED ---------------------------   The nursing notes within the ED encounter and vital signs as below have been reviewed by myself  BP (!) 149/58   Pulse (!) 102   Temp 98.4 °F (36.9 °C) (Oral)   Resp 24   SpO2 99%     Oxygen Saturation Interpretation: Normal    The patients available past medical records and past encounters were reviewed.         ------------------------------ ED COURSE/MEDICAL DECISION MAKING----------------------  Medications   atorvastatin (LIPITOR) tablet 80 mg (has no administration in time range)   Vitamin D (CHOLECALCIFEROL) tablet 2,000 Units (has no administration in time range)   clopidogrel (PLAVIX) tablet 75 mg ( Oral Automatically Held 10/1/22 0900)   hydroxychloroquine (PLAQUENIL) tablet 200 mg ( Oral Automatically Held 10/1/22 0900)   albuterol (PROVENTIL) nebulizer solution 2.5 mg (has no administration in time range)   budesonide (PULMICORT) nebulizer suspension 500 mcg (has no administration in time range)   Arformoterol Tartrate (BROVANA) nebulizer solution 15 mcg (has no administration in time range)   sodium chloride flush 0.9 % injection 5-40 mL (has no administration in time range)   sodium chloride flush 0.9 % injection 5-40 mL (has no administration in time range)   0.9 % sodium chloride infusion (has no administration in time range)   ondansetron (ZOFRAN-ODT) disintegrating tablet 4 mg (has no administration in time range)     Or   ondansetron (ZOFRAN) injection 4 mg (has no administration in time range)   acetaminophen (TYLENOL) tablet 650 mg (has no administration in time range)     Or   acetaminophen (TYLENOL) suppository 650 mg (has no administration in time range)   polyethylene glycol (GLYCOLAX) packet 17 g (has no administration in time range)   aspirin chewable tablet 81 mg (has no administration in time range)   0.9 % sodium chloride bolus (has no administration in time range)   magnesium sulfate 2000 mg in 50 mL IVPB premix (has no administration in time range)   sodium chloride 0.9 % infusion (has no administration in time range)   0.9 % sodium chloride infusion ( IntraVENous New Bag 9/28/22 3849)            Medical Decision Making: Patient presents with abnormal labs. He has no complaints. CBC did show an anemia of 6.8.  1 unit of packed red blood cells was ordered. CMP showed an CAYETANO with a creatinine of 4.2. Fluids were given. Given patient's lab values, patient will be admitted for further evaluation care. Patient admitted to telemetry. ED Course as of 09/28/22 1603   Wed Sep 28, 2022   1304 EKG shows sinus tachycardia at a ventricular rate of 102 bpm.  Normal axis. Does not meet STEMI criteria. Comparable to previous EKG on 8/16/2022. As interpreted by myself ED physician. [BB]      ED Course User Index  [BB] Meño Torres DO        Re-Evaluations:  Patient was reevaluted and continued to be stable. This patient's ED course included: a personal history and physicial examination, re-evaluation prior to disposition, IV medications, and cardiac monitoring    This patient has remained hemodynamically stable during their ED course. Consultations:  None      Critical Care: None       Counseling: The emergency provider has spoken with the patient and discussed todays results, in addition to providing specific details for the plan of care and counseling regarding the diagnosis and prognosis. Questions are answered at this time and they are agreeable with the plan.       --------------------------------- IMPRESSION AND DISPOSITION ---------------------------------    IMPRESSION  1. Acute on chronic anemia    2. Symptomatic anemia    3. Fatigue, unspecified type    4. Chronic kidney disease, unspecified CKD stage    5. Hyponatremia    6. Hypomagnesemia        DISPOSITION  Disposition: Admit to telemetry  Patient condition is stable    Patient was seen and evaluated by both myself and Samuel Shipley DO. NOTE: This report was transcribed using voice recognition software.  Every effort was made to ensure accuracy; however, inadvertent computerized transcription errors may be present           Meño Torres DO  Resident  09/28/22 7120

## 2022-09-28 NOTE — CARE COORDINATION
SS Note: No Covid test. Pt presented for fatigue and abnormal labs. Pt dx w/Acute on chronic renal failure, anemia. Pt likely to be transfused as hemoglobin 6.8. PTA pt on Plavix. It was noted Tobacco cessation will be extremely important in the patient's care moving forward given his severe central vascular disease. Pt also had hip fx 8/11 and multiple acute strokes. SW met w/pt in ED 15 for transition of care planning. Spoke from door wearing mask/PPE and explained role. Pt's sister @ bedside and pt gives permission to speak openly. Pt is  & lives alone in 1 story home. Prior to his hip fx & surgery this past August, pt is independent in IADL's/ADL's, drives and has insurance. PCP is Sheryl Diaz,  & Pharmacy is [facility]. Pt has following DME:  [employer], [employer], Shower [employer]. Denies hx of CEASAR & is currently active w/[facility]- PT only. Pt did go to [facility] after his stay @  for strokes. No hx of 02. SW completed ACP w/pt; Dec maker only. Pt did note he has 4 children, and he has POA @ home naming his son Cindi Dale as agent. SW stressed importance to have copy brought into hospital so it can honored. He said he has LW too but did not want to talk any further about this. He is feeling quite sick. Pt plans on returning home. Will need [facility] ORDER for [facility] via [place]. SW did talk to pt about smoking cessation program and pt notes he has been on patch for 6 weeks. He had 5 cigarettes the 1st week but none since. However, he is keeping a few packs around the house- just in case. SW complimented pt for quitting and encouraged him to throw away the remaining packs. SW called Effie Savage and informed her of pt's admission.   Electronically signed by CHRISTINA Mckenzie on 9/28/2022 at 3:37 PM

## 2022-09-28 NOTE — ED NOTES
Pt. Resting in bed, easy resp., no signs of transfusion reaction at this time.      Mahin Rivero RN  09/28/22 [id number]

## 2022-09-28 NOTE — ED PROVIDER NOTES
ATTENDING PROVIDER ATTESTATION:     Cole Montgomery presented to the emergency department for evaluation of Fatigue (Received call that his hemoglobin is 6.3 and kidneys are being affected. Told to come to ED)   and was initially evaluated by the Medical Resident. See Original ED Note for H&P and ED course above. I have reviewed and discussed the case, including pertinent history (medical, surgical, family and social) and exam findings with the Medical Resident assigned to Cole Montgomery. I have personally performed and/or participated in the history, exam, medical decision making, and procedures and agree with all pertinent clinical information and any additional changes or corrections are noted below in my assessment and plan. I have discussed this patient in detail with the resident, and provided the instruction and education,       I have reviewed my findings and recommendations with the assigned Medical Resident, Cole Montgomery and members of family present at the time of disposition. I have performed a history and physical examination of this patient and directly supervised the resident caring for this patient      History of Present Illness:    Presents to the ED for fatigue and abnormal labs , beginning a few days. The complaint has been persistent, moderate in severity, and worsened by nothing. Patient presents with fatigue and abnormal outpatient labs. Companied by his wife, had complicated medical history recently where he underwent hip surgery sustained acute strokes afterwards, was transferred to [facility] clinic, he had carotid stenting performed. Are watching his renal function which showed a creatinine of fours afterwards. Is not on dialysis but does follow with nephrology. Has had worsening fatigue since he got out of rehab about a month ago. Denies  chest pain shortness of breath, is unsure if he has had dark or tarry stools.   Was sent in after blood work yesterday showed worsening renal function and drop in his hemoglobin below 7. Review of Systems:   A complete review of systems was performed and pertinent positives and negatives are stated within HPI, all other systems reviewed and are negative.    --------------------------------------------- PAST HISTORY ---------------------------------------------  Past Medical History:  has a past medical history of Diverticulitis, Gout, Hypertension, and Tobacco abuse. Past Surgical History:  has a past surgical history that includes Colonoscopy (7 years ago); Appendectomy; Nasal fracture surgery; Cataract removal with implant (Right, 2 2 15); Cataract removal with implant (Left, 4/6/15); and hip surgery (Right, 8/13/2022). Social History:  reports that he has quit smoking. His smoking use included cigarettes. He has a 60.00 pack-year smoking history. He has never used smokeless tobacco. He reports current alcohol use. He reports that he does not use drugs. Family History: family history includes COPD in his mother; Diabetes in his father and mother. Unless otherwise noted, family history is non contributory    The patients home medications have been reviewed. Allergies: Patient has no known allergies. Physical Exam:  Constitutional/General: Alert and oriented x3  Head: Normocephalic and atraumatic  Eyes: PERRL, EOMI, sclera non icteric  ENT: Oropharynx clear, handling secretions  Neck: Supple, full ROM, no stridor, no meningeal signs  Respiratory: Lungs clear to auscultation bilaterally, Not in respiratory distress  Cardiovascular: Tachycardic at 100 and regular 2+ distal pulses. Equal extremity pulses. GI:  Abdomen Soft, Non tender, Non distended. No rebound, guarding, or rigidity. No pulsatile masses. Musculoskeletal: Moves all extremities x 4. Warm and well perfused  Palpable peripheral pulses  Integument: skin warm and dry. No rashes.    Neurologic: GCS 15,   Psychiatric: Normal Affect      I directly supervised any procedures performed by the resident and was present for the procedure including all critical portions of the procedure      Vitals:    09/28/22 1207   BP: (!) 126/93   Pulse:    Resp: 16   Temp:    SpO2:          Oxygen Saturation Interpretation: Normal    The cardiac monitor revealed NSR with ST with a heart rate in the 100s as interpreted by me. The cardiac monitor was ordered secondary to the patient's heart rate and to monitor the patient for dysrhythmia. CPT [id number]      EKG @ 1258: This EKG is signed and interpreted by Dr Laurie Delcid. Rate: 102  Rhythm: Sinus  Interpretation: Sinus rhythm with sinus tachycardia, MT is 114, QRS is 84, QTc is 458, nonspecific T changes stable compared to prior EKG from 8/6/2022  Comparison: stable as compared to patient's most recent EKG    The patients available past medical records and past encounters were reviewed. IDr. Cassie DO am the primary provider of record                    1. Acute on chronic anemia    2. Symptomatic anemia    3. Fatigue, unspecified type    4. Chronic kidney disease, unspecified CKD stage    5. Hyponatremia    6.  Hypomagnesemia           Za Dawkins DO  09/29/22 [id number]

## 2022-09-28 NOTE — CONSENT
Informed Consent for Blood Component Transfusion Note    I have discussed with the patient the rationale for blood component transfusion; its benefits in treating or preventing fatigue, organ damage, or death; and its risk which includes mild transfusion reactions, rare risk of blood borne infection, or more serious but rare reactions. I have discussed the alternatives to transfusion, including the risk and consequences of not receiving transfusion. The patient had an opportunity to ask questions and had agreed to proceed with transfusion of blood components.     Electronically signed by Lety Puentes DO on 9/28/22 at 2:22 PM EDT

## 2022-09-28 NOTE — ED NOTES
Attempted to call report to 5th floor, nurse unavailable nurse to call back.      Markel Velasco RN  09/28/22 1941

## 2022-09-29 ENCOUNTER — APPOINTMENT (OUTPATIENT)
Dept: CT IMAGING | Age: 72
DRG: 811 | End: 2022-09-29
Payer: MEDICARE

## 2022-09-29 LAB
ACANTHOCYTES: ABNORMAL
ALBUMIN SERPL-MCNC: 2.7 G/DL (ref 3.5–5.2)
ALP BLD-CCNC: 126 U/L (ref 40–129)
ALT SERPL-CCNC: 24 U/L (ref 0–40)
ANION GAP SERPL CALCULATED.3IONS-SCNC: 14 MMOL/L (ref 7–16)
AST SERPL-CCNC: 19 U/L (ref 0–39)
BASOPHILS ABSOLUTE: 0.01 E9/L (ref 0–0.2)
BASOPHILS RELATIVE PERCENT: 0.1 % (ref 0–2)
BILIRUB SERPL-MCNC: 0.5 MG/DL (ref 0–1.2)
BILIRUBIN DIRECT: <0.2 MG/DL (ref 0–0.3)
BILIRUBIN, INDIRECT: ABNORMAL MG/DL (ref 0–1)
BUN BLDV-MCNC: 57 MG/DL (ref 6–23)
BURR CELLS: ABNORMAL
CALCIUM SERPL-MCNC: 8.7 MG/DL (ref 8.6–10.2)
CHLORIDE BLD-SCNC: 95 MMOL/L (ref 98–107)
CHOLESTEROL, TOTAL: 61 MG/DL (ref 0–199)
CO2: 21 MMOL/L (ref 22–29)
CREAT SERPL-MCNC: 3 MG/DL (ref 0.7–1.2)
EKG ATRIAL RATE: 102 BPM
EKG P AXIS: 70 DEGREES
EKG P-R INTERVAL: 194 MS
EKG Q-T INTERVAL: 352 MS
EKG QRS DURATION: 84 MS
EKG QTC CALCULATION (BAZETT): 458 MS
EKG R AXIS: 39 DEGREES
EKG T AXIS: 8 DEGREES
EKG VENTRICULAR RATE: 102 BPM
EOSINOPHILS ABSOLUTE: 0.03 E9/L (ref 0.05–0.5)
EOSINOPHILS RELATIVE PERCENT: 0.2 % (ref 0–6)
GFR AFRICAN AMERICAN: 25
GFR NON-AFRICAN AMERICAN: 21 ML/MIN/1.73
GLUCOSE BLD-MCNC: 117 MG/DL (ref 74–99)
HAV IGM SER IA-ACNC: NORMAL
HBA1C MFR BLD: 5.5 % (ref 4–5.6)
HCT VFR BLD CALC: 22.2 % (ref 37–54)
HCT VFR BLD CALC: 23.2 % (ref 37–54)
HDLC SERPL-MCNC: 22 MG/DL
HEMOGLOBIN: 7.2 G/DL (ref 12.5–16.5)
HEMOGLOBIN: 7.4 G/DL (ref 12.5–16.5)
HEMOGLOBIN: 7.5 G/DL (ref 12.5–16.5)
HEMOGLOBIN: 7.7 G/DL (ref 12.5–16.5)
HEPATITIS B CORE IGM ANTIBODY: NORMAL
HEPATITIS B SURFACE ANTIGEN INTERPRETATION: NORMAL
HEPATITIS C ANTIBODY INTERPRETATION: NORMAL
IMMATURE GRANULOCYTES #: 0.09 E9/L
IMMATURE GRANULOCYTES %: 0.6 % (ref 0–5)
LDL CHOLESTEROL CALCULATED: 24 MG/DL (ref 0–99)
LYMPHOCYTES ABSOLUTE: 0.51 E9/L (ref 1.5–4)
LYMPHOCYTES RELATIVE PERCENT: 3.6 % (ref 20–42)
MAGNESIUM: 1.3 MG/DL (ref 1.6–2.6)
MCH RBC QN AUTO: 30.1 PG (ref 26–35)
MCHC RBC AUTO-ENTMCNC: 33.3 % (ref 32–34.5)
MCV RBC AUTO: 90.2 FL (ref 80–99.9)
MONOCYTES ABSOLUTE: 1.22 E9/L (ref 0.1–0.95)
MONOCYTES RELATIVE PERCENT: 8.6 % (ref 2–12)
NEUTROPHILS ABSOLUTE: 12.38 E9/L (ref 1.8–7.3)
NEUTROPHILS RELATIVE PERCENT: 86.9 % (ref 43–80)
OVALOCYTES: ABNORMAL
PDW BLD-RTO: 14.6 FL (ref 11.5–15)
PHOSPHORUS: 3.1 MG/DL (ref 2.5–4.5)
PLATELET # BLD: 466 E9/L (ref 130–450)
PMV BLD AUTO: 10 FL (ref 7–12)
POIKILOCYTES: ABNORMAL
POLYCHROMASIA: ABNORMAL
POTASSIUM SERPL-SCNC: 3.7 MMOL/L (ref 3.5–5)
RBC # BLD: 2.46 E12/L (ref 3.8–5.8)
SODIUM BLD-SCNC: 130 MMOL/L (ref 132–146)
T4 FREE: 0.99 NG/DL (ref 0.93–1.7)
TEAR DROP CELLS: ABNORMAL
TOTAL PROTEIN: 6.3 G/DL (ref 6.4–8.3)
TRIGL SERPL-MCNC: 73 MG/DL (ref 0–149)
TSH SERPL DL<=0.05 MIU/L-ACNC: 0.81 UIU/ML (ref 0.27–4.2)
VLDLC SERPL CALC-MCNC: 15 MG/DL
WBC # BLD: 14.2 E9/L (ref 4.5–11.5)

## 2022-09-29 PROCEDURE — 6360000002 HC RX W HCPCS: Performed by: HOSPITALIST

## 2022-09-29 PROCEDURE — 6360000002 HC RX W HCPCS: Performed by: INTERNAL MEDICINE

## 2022-09-29 PROCEDURE — 2580000003 HC RX 258: Performed by: INTERNAL MEDICINE

## 2022-09-29 PROCEDURE — 84443 ASSAY THYROID STIM HORMONE: CPT

## 2022-09-29 PROCEDURE — 2580000003 HC RX 258: Performed by: HOSPITALIST

## 2022-09-29 PROCEDURE — 6370000000 HC RX 637 (ALT 250 FOR IP): Performed by: INTERNAL MEDICINE

## 2022-09-29 PROCEDURE — 85025 COMPLETE CBC W/AUTO DIFF WBC: CPT

## 2022-09-29 PROCEDURE — 83735 ASSAY OF MAGNESIUM: CPT

## 2022-09-29 PROCEDURE — 2060000000 HC ICU INTERMEDIATE R&B

## 2022-09-29 PROCEDURE — 0202U NFCT DS 22 TRGT SARS-COV-2: CPT

## 2022-09-29 PROCEDURE — 97530 THERAPEUTIC ACTIVITIES: CPT | Performed by: PHYSICAL THERAPIST

## 2022-09-29 PROCEDURE — 36415 COLL VENOUS BLD VENIPUNCTURE: CPT

## 2022-09-29 PROCEDURE — 84100 ASSAY OF PHOSPHORUS: CPT

## 2022-09-29 PROCEDURE — 1200000000 HC SEMI PRIVATE

## 2022-09-29 PROCEDURE — 74176 CT ABD & PELVIS W/O CONTRAST: CPT

## 2022-09-29 PROCEDURE — 84439 ASSAY OF FREE THYROXINE: CPT

## 2022-09-29 PROCEDURE — 97161 PT EVAL LOW COMPLEX 20 MIN: CPT | Performed by: PHYSICAL THERAPIST

## 2022-09-29 PROCEDURE — 80061 LIPID PANEL: CPT

## 2022-09-29 PROCEDURE — 80048 BASIC METABOLIC PNL TOTAL CA: CPT

## 2022-09-29 PROCEDURE — 85018 HEMOGLOBIN: CPT

## 2022-09-29 PROCEDURE — 2580000003 HC RX 258: Performed by: NURSE PRACTITIONER

## 2022-09-29 PROCEDURE — 6360000004 HC RX CONTRAST MEDICATION: Performed by: RADIOLOGY

## 2022-09-29 PROCEDURE — 83036 HEMOGLOBIN GLYCOSYLATED A1C: CPT

## 2022-09-29 PROCEDURE — 80076 HEPATIC FUNCTION PANEL: CPT

## 2022-09-29 PROCEDURE — 85014 HEMATOCRIT: CPT

## 2022-09-29 PROCEDURE — 97110 THERAPEUTIC EXERCISES: CPT | Performed by: PHYSICAL THERAPIST

## 2022-09-29 PROCEDURE — 6370000000 HC RX 637 (ALT 250 FOR IP): Performed by: HOSPITALIST

## 2022-09-29 RX ORDER — SODIUM CHLORIDE 9 MG/ML
1000 INJECTION, SOLUTION INTRAVENOUS CONTINUOUS
Status: DISCONTINUED | OUTPATIENT
Start: 2022-09-29 | End: 2022-10-01

## 2022-09-29 RX ORDER — PANTOPRAZOLE SODIUM 40 MG/1
40 TABLET, DELAYED RELEASE ORAL
Status: DISCONTINUED | OUTPATIENT
Start: 2022-09-29 | End: 2022-10-03 | Stop reason: HOSPADM

## 2022-09-29 RX ORDER — MAGNESIUM SULFATE 1 G/100ML
1000 INJECTION INTRAVENOUS ONCE
Status: COMPLETED | OUTPATIENT
Start: 2022-09-29 | End: 2022-09-29

## 2022-09-29 RX ADMIN — SODIUM CHLORIDE 1000 ML: 9 INJECTION, SOLUTION INTRAVENOUS at 17:20

## 2022-09-29 RX ADMIN — IOHEXOL 50 ML: 240 INJECTION, SOLUTION INTRATHECAL; INTRAVASCULAR; INTRAVENOUS; ORAL at 19:25

## 2022-09-29 RX ADMIN — ASPIRIN 81 MG CHEWABLE TABLET 81 MG: 81 TABLET CHEWABLE at 09:09

## 2022-09-29 RX ADMIN — PANTOPRAZOLE SODIUM 40 MG: 40 TABLET, DELAYED RELEASE ORAL at 17:22

## 2022-09-29 RX ADMIN — SODIUM CHLORIDE 100 MG: 9 INJECTION, SOLUTION INTRAVENOUS at 19:51

## 2022-09-29 RX ADMIN — MAGNESIUM SULFATE HEPTAHYDRATE 1000 MG: 1 INJECTION, SOLUTION INTRAVENOUS at 15:53

## 2022-09-29 RX ADMIN — Medication 10 ML: at 09:10

## 2022-09-29 RX ADMIN — ATORVASTATIN CALCIUM 80 MG: 40 TABLET, FILM COATED ORAL at 20:39

## 2022-09-29 RX ADMIN — Medication 2000 UNITS: at 09:09

## 2022-09-29 RX ADMIN — SODIUM CHLORIDE 25 MG: 9 INJECTION, SOLUTION INTRAVENOUS at 17:20

## 2022-09-29 ASSESSMENT — PAIN DESCRIPTION - ONSET: ONSET: ON-GOING

## 2022-09-29 ASSESSMENT — PAIN DESCRIPTION - ORIENTATION: ORIENTATION: MID

## 2022-09-29 ASSESSMENT — PAIN SCALES - GENERAL
PAINLEVEL_OUTOF10: 5
PAINLEVEL_OUTOF10: 0

## 2022-09-29 ASSESSMENT — PAIN DESCRIPTION - LOCATION: LOCATION: GROIN

## 2022-09-29 ASSESSMENT — PAIN - FUNCTIONAL ASSESSMENT: PAIN_FUNCTIONAL_ASSESSMENT: PREVENTS OR INTERFERES SOME ACTIVE ACTIVITIES AND ADLS

## 2022-09-29 ASSESSMENT — PAIN DESCRIPTION - PAIN TYPE: TYPE: ACUTE PAIN

## 2022-09-29 ASSESSMENT — PAIN DESCRIPTION - FREQUENCY: FREQUENCY: CONTINUOUS

## 2022-09-29 ASSESSMENT — PAIN DESCRIPTION - DESCRIPTORS: DESCRIPTORS: ACHING

## 2022-09-29 NOTE — CONSULTS
Podiatry  Attending Consult Note      Reason for Consult: Left heel ulcer, right heel callus  Requesting Physician:  Dr. Dylon Cho:  SANGITA    HISTORY OF PRESENT ILLNESS:      The patient is a 81 y.o. male with significant past medical history of diverticulitis, gout, HTN, tobacco abuse who presents with right heel callus and left heel ulceration. Patient states that he has had the right heel callus for several months and the left heel wound started approximately 2-3 weeks ago after his heels were resting on a bar during transport in an ambulance. Patient reports pain with pressure to left heel and with pressure to the callus right foot.     Past Medical History:    Past Medical History:   Diagnosis Date    Diverticulitis     Gout     Hypertension     Tobacco abuse      Past Surgical History:    Past Surgical History:   Procedure Laterality Date    APPENDECTOMY      CATARACT REMOVAL WITH IMPLANT Right 2 2 15    CATARACT REMOVAL WITH IMPLANT Left 4/6/15    COLONOSCOPY  7 years ago    Dr Yolanda Chin Right 8/13/2022    RIGHT HIP HEMIARTHROPLASTY performed by Mona Rucker DO at [address]      child     Current Medications:     ferric gluconate (FERRLECIT) test dose IVPB  25 mg IntraVENous Once    Followed by    ferric gluconate (FERRLECIT) IVPB  100 mg IntraVENous Once    Followed by    Nayely Pang ON 9/30/2022] ferric gluconate (FERRLECIT) IVPB  125 mg IntraVENous Once    pantoprazole  40 mg Oral BID AC    atorvastatin  80 mg Oral Nightly    Vitamin D  2,000 Units Oral Daily    [Held by provider] clopidogrel  75 mg Oral Daily    [Held by provider] hydroxychloroquine  200 mg Oral Daily    budesonide  0.5 mg Nebulization BID    Arformoterol Tartrate  15 mcg Nebulization BID    sodium chloride flush  5-40 mL IntraVENous 2 times per day    aspirin  81 mg Oral Daily      sodium chloride      sodium chloride       albuterol, sodium chloride flush, sodium chloride, ondansetron **OR** ondansetron, acetaminophen **OR** acetaminophen, polyethylene glycol    Allergies:  Patient has no known allergies. Social History:    Social History     Socioeconomic History    Marital status:      Spouse name: Not on file    Number of children: Not on file    Years of education: Not on file    Highest education level: Not on file   Occupational History    Not on file   Tobacco Use    Smoking status: Former     Packs/day: 1.50     Years: 40.00     Pack years: 60.00     Types: Cigarettes    Smokeless tobacco: Never   Vaping Use    Vaping Use: Never used   Substance and Sexual Activity    Alcohol use: Yes     Comment: beer daily    Drug use: No    Sexual activity: Not on file   Other Topics Concern    Not on file   Social History Narrative    Not on file     Social Determinants of Health     Financial Resource Strain: Not on file   Food Insecurity: Not on file   Transportation Needs: Not on file   Physical Activity: Not on file   Stress: Not on file   Social Connections: Not on file   Intimate Partner Violence: Not on file   Housing Stability: Not on file     Family History:       Problem Relation Age of Onset    COPD Mother     Diabetes Mother     Diabetes Father      PHYSICAL EXAM:      Vitals:    BP (!) 164/68   Pulse 99   Temp 98.7 °F (37.1 °C) (Oral)   Resp 18   Ht 5' 7.5\" (1.715 m)   Wt 164 lb 8 oz (74.6 kg)   SpO2 95%   BMI 25.38 kg/m²     Patient seen resting in bed with heels elevated on a pillow. He is AOx4. Sister present at bedside. VASC: DP/PT pulses are palpable 2/4 b/l. Cft brisk to digits. Skin temperature is warm to warm from proximal to distal. No increase in temperature noted to right or left foot. No edema noted. NEUROLOGIC: Gross sensation appears to be intact b/l  DERM:Hyperkeratotic tissue noted to right posterior lateral heel. Skin is otherwise intact to this foot. Left foot has violaceous discoloration noted to posterior heel. Mildly soft.  No overt open lesions noted. Scaling skin noted around the wound. MUSCULOSKELETAL: Muscle strength is 5/5 for all muscle groups tested.  Tenderness to palpation of the right heel callus and left heel DTI.    0 Result Notes  Component Ref Range & Units 9/29/22 0744 9/29/22 0039 9/28/22 1452 9/28/22 1308 8/17/22 0421 8/16/22 0438 8/15/22 0432   WBC 4.5 - 11.5 E9/L 14.2 High     15.2 High   7.6  9.1  12.8 High     RBC 3.80 - 5.80 E12/L 2.46 Low     2.26 Low   2.64 Low   2.63 Low   2.62 Low     Hemoglobin 12.5 - 16.5 g/dL 7.4 Low   7.5 Low    6.8 Low Panic   8.6 Low   8.7 Low   8.5 Low     Hematocrit 37.0 - 54.0 % 22.2 Low   22.2 Low   20.8 Low   21.1 Low   25.9 Low   26.5 Low   26.2 Low     MCV 80.0 - 99.9 fL 90.2    93.4  98.1  100.8 High   100.0 High     MCH 26.0 - 35.0 pg 30.1    30.1  32.6  33.1  32.4    MCHC 32.0 - 34.5 % 33.3    32.2  33.2  32.8  32.4    RDW 11.5 - 15.0 fL 14.6    14.4  12.8  13.2  13.3    Platelets 719 - 933 W0/Y 466 High     483 High   297  282  270    MPV 7.0 - 12.0 fL 10.0    9.9  9.7  9.8  9.8    Neutrophils % 43.0 - 80.0 % 86.9 High     83.6 High   69.5  75.2  81.9 High     Immature Granulocytes % 0.0 - 5.0 % 0.6    0.7  0.3  0.3  0.6    Lymphocytes % 20.0 - 42.0 % 3.6 Low     5.4 Low   14.0 Low   11.8 Low   8.1 Low     Monocytes % 2.0 - 12.0 % 8.6    9.6  13.4 High   11.5  9.1    Eosinophils % 0.0 - 6.0 % 0.2    0.6  2.5  1.1  0.2    Basophils % 0.0 - 2.0 % 0.1    0.1  0.3  0.1  0.1    Neutrophils Absolute 1.80 - 7.30 E9/L 12.38 High     12.72 High   5.32  6.81  10.46 High     Immature Granulocytes # E9/L 0.09    0.10  0.02  0.03  0.08    Lymphocytes Absolute 1.50 - 4.00 E9/L 0.51 Low     0.82 Low   1.07 Low   1.07 Low   1.04 Low     Monocytes Absolute 0.10 - 0.95 E9/L 1.22 High     1.46 High   1.02 High   1.04 High   1.16 High     Eosinophils Absolute 0.05 - 0.50 E9/L 0.03 Low     0.09  0.19  0.10  0.02 Low     Basophils Absolute 0.00 - 0.20 E9/L 0.01    0.02  0.02  0.01  0.01    Polychromasia  1+ Poikilocytes  2+          Acanthocytes  1+          Michael Cells  1+          Ovalocytes  1+          Tear Drop Cells  1+          Retic Ct Pct    1.2 R        Retic Ct Abs    0.027 R        Immature Retic Fract    14.5 High  R        Retic HGB Equivalent    31.9 R               Component Ref Range & Units 9/29/22 0744 9/28/22 1308 9/19/22 1005 8/17/22 0421 8/16/22 0438 8/15/22 0432 8/14/22 0433   Sodium 132 - 146 mmol/L 130 Low   127 Low   128 Low   135  136  137  135    Potassium 3.5 - 5.0 mmol/L 3.7  3.8  4.7  4.3  4.4  4.2  4.9    Chloride 98 - 107 mmol/L 95 Low   91 Low   93 Low   101  101  103  106    CO2 22 - 29 mmol/L 21 Low   21 Low   19 Low   27  26  25  23    Anion Gap 7 - 16 mmol/L 14  15  16  7  9  9  6 Low     Glucose 74 - 99 mg/dL 117 High   153 High   109 High   89  73 Low   128 High   232 High     BUN 6 - 23 mg/dL 57 High   72 High   54 High   18  19  27 High   29 High     Creatinine 0.7 - 1.2 mg/dL 3.0 High   4.2 High   4.2 High   1.2  1.1  1.3 High   1.3 High     GFR Non-African American >=60 mL/min/1.73 21  14 CM  14 CM  60 CM  >60 CM  55 CM  55 CM    Comment: Chronic Kidney Disease: less than 60 ml/min/1.73 sq.m. Kidney Failure: less than 15 ml/min/1.73 sq.m. Results valid for patients 18 years and older. GFR   25  17  17  >60  >60  >60  >60    Calcium 8.6 - 10.2 mg/dL 8.7  8.8  9.3  9.3  9.4  9.4  8.9    Total Protein   7.0 R      5.7 Low  R    Albumin   3.0 Low  R      3.0 Low  R    Total Bilirubin   0.2 R      <0.2 R    Alkaline Phosphatase   129 R      68 R    ALT   30 R      11 R    AST   28 R      32 R      IMPRESSION/RECOMMENDATIONS:      Pressure ulcer left heel, tatum stage grade 1, present on admission  2. Left heel callus  3. Mild xerosis  4.  Acute on chronic renal failure    -Patient examined and evaluated  -Clinical findings and treatment options discussed with patient  -Patient to wear heel offloading boot a/o have heels floating off bed surface at all times. Patient aware and agreeable. -Will debride thickened tissue to right heel  -Skin protection padding to right and left heel.  Change daily  -Moisturizer to right and left LE daily as needed  -Will continue to follow    Thank you very much for this consultation    Electronically signed by Feliciano Dimas DPM on 9/29/2022 at 5:06 PM

## 2022-09-29 NOTE — PLAN OF CARE
-Reviewed chart for possible HF, at time of review patient does not have history of nor has been admitted for HF  -Advised to follow up with PCP post hospital discharge.    Jessica Burks MSN, RN  Heart Failure Navigator

## 2022-09-29 NOTE — CONSULTS
Alexander Adrian M.D. [facility]  Dr. Gema Montes De Oca M.D.,  Dr. Jacquelyn Morales M.D.,  Dr. Geraldo Ormond, D.O.,  Dr. Connie Wilson D.O. ,  Dr. Prince Wharton M.D.,  Dr. Nasreen Mcqueen D.O. Myriam Crawford  81 y.o.  male      Re: Acute on chronic anemia  Requesting physician: Dr. Shayy Patten  Date:11:09 AM 9/29/2022          HPI: 80-minute male patient seen in the hospital for above-described issue. He presented to the hospital yesterday with chief complaint of fatigue. Patient was noted to be anemic with hemoglobin of 6.8 compared to hemoglobin of 8.6 in August of this year. Patient also was noted to be positive for COVID-19. He also was found to be significantly iron deficient. Patient himself denies abdominal pain currently. He denies nausea vomiting. He denies hematemesis emesis of coffee-ground material.  He denies blood in the stool or melena. He reports upper endoscopy in our office 1 to 2 years ago which she believes was fairly unremarkable. Patient reports colonoscopy in 2005 and subsequently having her mother send out stool tests. Patient reports weight loss which he contributes to being in the hospital -patient apparently was in the hospital for catheter procedure after cleaning.     Information sources:   -Patient  -medical record  -health care team    PMHx:  Past Medical History:   Diagnosis Date    Diverticulitis     Gout     Hypertension     Tobacco abuse        PSHx:  Past Surgical History:   Procedure Laterality Date    APPENDECTOMY      CATARACT REMOVAL WITH IMPLANT Right 2 2 15    CATARACT REMOVAL WITH IMPLANT Left 4/6/15    COLONOSCOPY  7 years ago    Dr John Sood Right 8/13/2022    RIGHT HIP HEMIARTHROPLASTY performed by Alin Boyer DO at [address]      child       Meds:  Current Facility-Administered Medications   Medication Dose Route Frequency Provider Last Rate Last Admin    atorvastatin (LIPITOR) tablet 80 mg  80 mg Oral Nightly Sheria Hodge, DO   80 mg at 09/28/22 2105    Vitamin D (CHOLECALCIFEROL) tablet 2,000 Units  2,000 Units Oral Daily Sheria Hodge, DO   2,000 Units at 09/29/22 1968    [Held by provider] clopidogrel (PLAVIX) tablet 75 mg  75 mg Oral Daily Sheria Hodge, DO        [Held by provider] hydroxychloroquine (PLAQUENIL) tablet 200 mg  200 mg Oral Daily Sheria Hodge, DO        albuterol (PROVENTIL) nebulizer solution 2.5 mg  2.5 mg Nebulization Q4H PRN Sheria Hodge, DO        budesonide (PULMICORT) nebulizer suspension 500 mcg  0.5 mg Nebulization BID Sheria Hodge, DO   500 mcg at 09/28/22 1957    Arformoterol Tartrate (BROVANA) nebulizer solution 15 mcg  15 mcg Nebulization BID Sheria Hodge, DO   15 mcg at 09/28/22 1957    sodium chloride flush 0.9 % injection 5-40 mL  5-40 mL IntraVENous 2 times per day Sheria Hodge, DO   10 mL at 09/29/22 0910    sodium chloride flush 0.9 % injection 5-40 mL  5-40 mL IntraVENous PRN Sheria Hodge, DO        0.9 % sodium chloride infusion   IntraVENous PRN Sheria Hodge, DO        ondansetron (ZOFRAN-ODT) disintegrating tablet 4 mg  4 mg Oral Q8H PRN Sheria Hodge, DO        Or    ondansetron [facility]) injection 4 mg  4 mg IntraVENous Q6H PRN Sheria Hodge, DO        acetaminophen (TYLENOL) tablet 650 mg  650 mg Oral Q6H PRN Sheria Hodge, DO        Or    acetaminophen (TYLENOL) suppository 650 mg  650 mg Rectal Q6H PRN Sheria Hodge, DO        polyethylene glycol (GLYCOLAX) packet 17 g  17 g Oral Daily PRN Sheria Hodge, DO        aspirin chewable tablet 81 mg  81 mg Oral Daily Sheria Hodge, DO   81 mg at 09/29/22 0909        SocHx:  Social History     Socioeconomic History    Marital status:      Spouse name: Not on file    Number of children: Not on file    Years of education: Not on file    Highest education level: Not on file   Occupational History    Not on file   Tobacco Use    Smoking status: Former     Packs/day: 1.50     Years: 40.00     Pack years: 60.00     Types: Cigarettes Smokeless tobacco: Never   Vaping Use    Vaping Use: Never used   Substance and Sexual Activity    Alcohol use: Yes     Comment: beer daily    Drug use: No    Sexual activity: Not on file   Other Topics Concern    Not on file   Social History Narrative    Not on file     Social Determinants of Health     Financial Resource Strain: Not on file   Food Insecurity: Not on file   Transportation Needs: Not on file   Physical Activity: Not on file   Stress: Not on file   Social Connections: Not on file   Intimate Partner Violence: Not on file   Housing Stability: Not on file       FamHx:  Family History   Problem Relation Age of Onset    COPD Mother     Diabetes Mother     Diabetes Father        Allergy:No Known Allergies      ROS: As described in the HPI and in addition is negative upon detailed review of systems or unobtainable unless otherwise stated in this dictation. PE:  BP (!) 121/50   Pulse 81   Temp 99.1 °F (37.3 °C) (Oral)   Resp 18   Ht 5' 7.5\" (1.715 m)   Wt 164 lb 8 oz (74.6 kg)   SpO2 98%   BMI 25.38 kg/m²     Gen.: NAD/ male. AAOx3  Head: Atraumatic/normocephalic  Eyes: EOMI/anicteric sclera  ENT: Moist oral mucosa/no discharge from nose ears  Neck: Supple with trachea midline  Chest: CTA B  Cor: Regular  Abd.: Soft/NT/ND  Extr.:  No significant peripheral edema  Muscles: Decreased tone and bulk, consistent with age and condition  Skin: Warm and dry.   Anicteric      DATA:     Lab Results   Component Value Date/Time    WBC 14.2 09/29/2022 07:44 AM    RBC 2.46 09/29/2022 07:44 AM    HGB 7.4 09/29/2022 07:44 AM    HCT 22.2 09/29/2022 07:44 AM    MCV 90.2 09/29/2022 07:44 AM    MCH 30.1 09/29/2022 07:44 AM    MCHC 33.3 09/29/2022 07:44 AM    RDW 14.6 09/29/2022 07:44 AM     09/29/2022 07:44 AM    MPV 10.0 09/29/2022 07:44 AM     Lab Results   Component Value Date/Time     09/29/2022 07:44 AM    K 3.7 09/29/2022 07:44 AM    K 3.8 09/28/2022 01:08 PM    CL 95 09/29/2022 07:44 AM CO2 21 09/29/2022 07:44 AM    BUN 57 09/29/2022 07:44 AM    CREATININE 3.0 09/29/2022 07:44 AM    CALCIUM 8.7 09/29/2022 07:44 AM    PROT 6.3 09/29/2022 07:44 AM    LABALBU 2.7 09/29/2022 07:44 AM    BILITOT 0.5 09/29/2022 07:44 AM    ALKPHOS 126 09/29/2022 07:44 AM    AST 19 09/29/2022 07:44 AM    ALT 24 09/29/2022 07:44 AM     Lab Results   Component Value Date/Time    LIPASE 25 08/11/2021 02:54 PM     No results found for: AMYLASE      ASSESSMENT/PLAN:  Patient Active Problem List   Diagnosis    Closed right hip fracture, initial encounter (HCC)    Hip fracture (HCC)    Left foot drop    Stroke (cerebrum) (HCC)    Acute renal failure superimposed on stage 4 chronic kidney disease, unspecified acute renal failure type (HCC)    Acute renal failure (ARF) ([facility] [address]     1. Anemia  -Acute on chronic  -Normocytic  -Iron deficient  -No evidence of overt bleed  -Obtain FOBT  -IV iron supplementation  -Monitor H&H; defer transfusion to admitting  -Plan for further evaluation with nonemergent upper endoscopy/colonoscopy once acute issues (COVID-19) resolved unless emergent    Above has been discussed with the patient and all questions answered to his satisfaction. He verbalized understanding and agreement with the plan as delineated. Thank you for the opportunity to see this patient in consultation    Eligio Keita MD  9/29/2022  11:09 AM    NOTE:  This report was transcribed using voice recognition software. Every effort was made to ensure accuracy; however, inadvertent computerized transcription errors may be present.

## 2022-09-29 NOTE — CONSULTS
Consult Note  NEPHROLOGY    Reason for Consult: Management of acute on chronic kidney disease    Requesting Physician:  Dr. Hola Meek. Chief Complaint: Admitted with acute kidney injury and anemia    History Obtained From:  patient, electronic medical record    History of Present Ilness: This is a 81 y.o.  male well-known to our practice for his history of chronic kidney disease stage III. He now presents to the ED on 9/28/2022 at the request of our office for abnormal labs. Vitals upon arrival included  BP (!) 149/58   Pulse (!) 102   Temp 98.4 °F (36.9 °C) (Oral)   Resp 24   SpO2 99%. Pertinent labs included WBC 15.2, hemoglobin 6.8, hematocrit 21.1 and platelet count 764. BMP revealed sodium 127, potassium 3.8, chloride 91, CO2 21, anion gap 15, BUN 72 and creatinine 4.2. Magnesium low at 1.1.  UA revealed straw slightly cloudy urine, trace blood, 1.020, trace protein, and moderate bacteria. Patient was subsequently admitted with CAYETANO and anemia. Patient now examined sitting up in bed in no acute distress. He is in [facility] protocol. He is somewhat of a poor historian and no family is present. He is complaining of generalized weakness and fatigue. He denies any chest pain or shortness of breath at rest.  He also denies any nausea, vomiting or diarrhea. Currently has a Whitley catheter intact. He states that over the past several weeks he has had urinary urgency. Patient was most recently seen in the office by Dr. Rohit German on 9/20/2022. It was noted that he underwent recent hip surgery and had several medical issues thereafter. These medical issues included a recent stroke. He underwent a carotid procedure in [facility]. As part of his work-up, he had several contrast-enhanced studies.     Past Medical History:        Diagnosis Date    Diverticulitis     Gout     Hypertension     Tobacco abuse        Past Surgical History:        Procedure Laterality Date APPENDECTOMY      CATARACT REMOVAL WITH IMPLANT Right 2 2 15    CATARACT REMOVAL WITH IMPLANT Left 4/6/15    COLONOSCOPY  7 years ago    Dr Shashi Ortega Right 8/13/2022    RIGHT HIP HEMIARTHROPLASTY performed by Doris Wong DO at [address]      child       Current Medications:    Current Facility-Administered Medications: ferric gluconate (FERRLECIT) 25 mg in sodium chloride 0.9 % 50 mL (test dose) IVPB, 25 mg, IntraVENous, Once **FOLLOWED BY** ferric gluconate (FERRLECIT) 100 mg in sodium chloride 0.9 % 100 mL IVPB, 100 mg, IntraVENous, Once **FOLLOWED BY** [START ON 9/30/2022] ferric gluconate (FERRLECIT) 125 mg in sodium chloride 0.9 % 100 mL IVPB, 125 mg, IntraVENous, Once  pantoprazole (PROTONIX) tablet 40 mg, 40 mg, Oral, BID AC  magnesium sulfate 1000 mg in dextrose 5% 100 mL IVPB, 1,000 mg, IntraVENous, Once  atorvastatin (LIPITOR) tablet 80 mg, 80 mg, Oral, Nightly  Vitamin D (CHOLECALCIFEROL) tablet 2,000 Units, 2,000 Units, Oral, Daily  [Held by provider] clopidogrel (PLAVIX) tablet 75 mg, 75 mg, Oral, Daily  [Held by provider] hydroxychloroquine (PLAQUENIL) tablet 200 mg, 200 mg, Oral, Daily  albuterol (PROVENTIL) nebulizer solution 2.5 mg, 2.5 mg, Nebulization, Q4H PRN  budesonide (PULMICORT) nebulizer suspension 500 mcg, 0.5 mg, Nebulization, BID  Arformoterol Tartrate (BROVANA) nebulizer solution 15 mcg, 15 mcg, Nebulization, BID  sodium chloride flush 0.9 % injection 5-40 mL, 5-40 mL, IntraVENous, 2 times per day  sodium chloride flush 0.9 % injection 5-40 mL, 5-40 mL, IntraVENous, PRN  0.9 % sodium chloride infusion, , IntraVENous, PRN  ondansetron (ZOFRAN-ODT) disintegrating tablet 4 mg, 4 mg, Oral, Q8H PRN **OR** ondansetron (ZOFRAN) injection 4 mg, 4 mg, IntraVENous, Q6H PRN  acetaminophen (TYLENOL) tablet 650 mg, 650 mg, Oral, Q6H PRN **OR** acetaminophen (TYLENOL) suppository 650 mg, 650 mg, Rectal, Q6H PRN  polyethylene glycol (GLYCOLAX) packet 17 g, 17 g, Oral, Daily PRN  aspirin chewable tablet 81 mg, 81 mg, Oral, Daily    Allergies:  Patient has no known allergies. Social History:      reports that he has quit smoking. His smoking use included cigarettes. He has a 60.00 pack-year smoking history. He has never used smokeless tobacco. He reports current alcohol use. He reports that he does not use drugs. Family History:     Family History   Problem Relation Age of Onset    COPD Mother     Diabetes Mother     Diabetes Father          Review of Systems:       Pertinent positives stated above in HPI. All other systems were reviewed and were negative.     Physical exam:   Constitutional:  VITALS:  BP (!) 121/50   Pulse 81   Temp 99.1 °F (37.3 °C) (Oral)   Resp 18   Ht 5' 7.5\" (1.715 m)   Wt 164 lb 8 oz (74.6 kg)   SpO2 98%   BMI 25.38 kg/m²   CURRENT TEMPERATURE:  Temp: 99.1 °F (37.3 °C)  CURRENT RESPIRATORY RATE:  Resp: 18  CURRENT PULSE:  Heart Rate: 81  CURRENT BLOOD PRESSURE:  BP: (!) 121/50  24HR BLOOD PRESSURE RANGE:  Systolic (91FKI), ZIP:517 , Min:121 , EQA:392   ; Diastolic (49VGQ), POJ:76, Min:50, Max:72    24HR INTAKE/OUTPUT:    Intake/Output Summary (Last 24 hours) at 9/29/2022 1224  Last data filed at 9/29/2022 0512  Gross per 24 hour   Intake --   Output 1325 ml   Net -1325 ml     Gen: alert, awake, nad  Skin: no rash, turgor wnl  Heent:  eomi, mmm  Neck: no bruits or jvd noted  Cardiovascular:  S1, S2 without m/r/g  Respiratory: Diminished but clear  Abdomen:  +bs, soft, nt, nd  Ext: No edema  Psychiatric: mood and affect appropriate  Musculoskeletal:  Rom, muscular strength intact    DATA:      CBC:   Lab Results   Component Value Date/Time    WBC 14.2 09/29/2022 07:44 AM    RBC 2.46 09/29/2022 07:44 AM    HGB 7.4 09/29/2022 07:44 AM    HCT 22.2 09/29/2022 07:44 AM    MCV 90.2 09/29/2022 07:44 AM    MCH 30.1 09/29/2022 07:44 AM    MCHC 33.3 09/29/2022 07:44 AM    RDW 14.6 09/29/2022 07:44 AM     09/29/2022 07:44 AM    MPV 10.0 09/29/2022 07:44 AM     BMP:    Lab Results   Component Value Date/Time     09/29/2022 07:44 AM    K 3.7 09/29/2022 07:44 AM    K 3.8 09/28/2022 01:08 PM    CL 95 09/29/2022 07:44 AM    CO2 21 09/29/2022 07:44 AM    BUN 57 09/29/2022 07:44 AM    LABALBU 2.7 09/29/2022 07:44 AM    CREATININE 3.0 09/29/2022 07:44 AM    CALCIUM 8.7 09/29/2022 07:44 AM    GFRAA 25 09/29/2022 07:44 AM    LABGLOM 21 09/29/2022 07:44 AM    GLUCOSE 117 09/29/2022 07:44 AM       RAD:  No results found. Assessment/Plan    1) CAYETANO  Of undetermined etiology; certainly anemia is a factor  Concern for recent contrast studies during his work-up at [facility]  Urine studies: Urea 520, Na 37, Chl 32, Osmo 329, cr 105  FENa 1.2% supporting prerenal cause   Serum creatinine 4.2 upon admission has trended to 3.0 with a.m. labs  PLAN:  Initiate IV fluids  CT of the abdomen is ordered by the primary team  Urine results noted above  Repeat labs in the a.m. Whitley in place: Strict intake and output    2) CKD Stage 3  Based on history of hypertension  Baseline scr 1.3 -> 1.6    3) Hyponatremia  Patient euvolemic upon exam  Initial sodium 127 has trended to 130  Repeat labs in the a.m. 4) Hypokalemia  Mild  Supplement K as needed for goal >4.0  Follow magnesium closely as well    5)  Anemia  History of CKD is a component  GIs been consulted as well  GI started IV Ferrlecit  Repeat labs in the a.m. Thank you for allowing us to participate in care of Mr Everardo HeathVLADIMIR - CNP  9/29/2022  12:24 PM Patient seen and examined. No family member is present at the bedside. Chart reviewed. I had a face to face encounter with the patient. Agree with exam.    Agree with  formulation, assessment and plan as outlined above and directed by me. Addition and corrections were done as deemed appropriate. My exam and plan include:     Continue current treatment as outlined above.     Acute kidney injury, possibly secondary renal hypoperfusion in the setting of decreased effective circulating volume due to profound anemia. Serum creatinine has improved. We'll follow.       Du Gilmore MD  Nephrology        Electronically signed by Du Gilmore MD on 9/30/2022 at 12:05 AM

## 2022-09-29 NOTE — PROGRESS NOTES
Physical Therapy Initial Evaluation/Plan of Care    Room #:  1393/4149-87  Patient Name: Andrew Taylor  YOB: 1950  MRN: [id number]    Date of Service: 9/29/2022     Tentative placement recommendation: Subacute vs Home Health Physical Therapy if patient meets goals  Equipment recommendation: Patient has needed equipment       Evaluating Physical Therapist: Holly Walton, PT #[id number]      Specific Provider Orders/Date/Referring Provider :  09/28/22 1400    PT eval and treat  Start:  09/28/22 1400,   End:  09/28/22 1400,   ONE TIME,   Standing Count:  1 Occurrences,   R         Diana Haynes DO     Admitting Diagnosis:   Hypomagnesemia [E83.42]  Hyponatremia [E87.1]  Symptomatic anemia [D64.9]  Fatigue, unspecified type [R53.83]  Chronic kidney disease, unspecified CKD stage [N18.9]  Acute renal failure superimposed on stage 4 chronic kidney disease, unspecified acute renal failure type ([facility] [N17.9, N18.4]  Acute on chronic anemia [D64.9]     Fatigue    Acute on chronic anemia    Surgery: none R hip hemiarthroplasty on 8/13/22  Visit Diagnoses         Codes    Acute on chronic anemia    -  Primary D64.9    Symptomatic anemia     D64.9    Fatigue, unspecified type     R53.83    Chronic kidney disease, unspecified CKD stage     N18.9    Hyponatremia     E87.1    Hypomagnesemia     E83.42            Patient Active Problem List   Diagnosis    Closed right hip fracture, initial encounter ([facility] [address]    Hip fracture ([facility] [address]    Left foot drop    Stroke (cerebrum) ([facility] [address]    Acute renal failure superimposed on stage 4 chronic kidney disease, unspecified acute renal failure type ([address]    Acute renal failure (ARF) ([facility] [address]        ASSESSMENT of Current Deficits Patient exhibits decreased strength, balance, endurance, and range of motion impairing functional mobility, transfers, gait , gait distance, and tolerance to activity holds left knee in flexion in bed, reports unable to extend however able to extend during gait with impaired terminal knee extension control. Moderated assist to stand from low surface. Patient requires continued skilled physical therapy to address concerns listed above for increased safety and function at discharge. PHYSICAL THERAPY  PLAN OF CARE       Physical therapy plan of care is established based on physician order,  patient diagnosis and clinical assessment    Current Treatment Recommendations:    -Standing Balance: Perform strengthening exercises in standing to promote motor control with or without upper extremity support   -Transfers: Provide instruction on proper hand and foot position for adequate transfer of weight onto lower extremities and use of gait device if needed and Cues for hand placement, technique and safety. Provide stabilization to prevent fall   -Gait: Gait training, Standing activities to improve: base of support, weight shift, weight bearing , and Pregait training to emphasize: Sequencing , Upright, and Safety   -Endurance: Utilize Supervised activities to increase level of endurance to allow for safe functional mobility including transfers and gait   -Stairs: Stair training with instruction on proper technique and hand placement on rail    PT long term treatment goals are located in below grid    Patient and or family understand(s) diagnosis, prognosis, and plan of care. Frequency of treatments: Patient will be seen  daily.          Prior Level of Function: Patient ambulated with wheeled walker    Rehab Potential: fair + for baseline    Past medical history:   Past Medical History:   Diagnosis Date    Diverticulitis     Gout     Hypertension     Tobacco abuse      Past Surgical History:   Procedure Laterality Date    APPENDECTOMY      CATARACT REMOVAL WITH IMPLANT Right 2 2 15    CATARACT REMOVAL WITH IMPLANT Left 4/6/15    COLONOSCOPY  7 years ago    Dr Elizabeth Shoulders Right 8/13/2022    RIGHT HIP HEMIARTHROPLASTY performed by Andrea Alvares DO at [address] [employer] FRACTURE SURGERY      child       SUBJECTIVE:    Precautions: Up with assistance, falls, alarm, Droplet plus/COVID-19, and hip precautions ,  R hip hemiarthroplasty 8/13/2022,   MRI OF THE BRAIN WITHOUT AND WITH CONTRAST  8/15/2022 12:01 pm  1. Tiny acute infarcts are seen involving the parietal lobes bilaterally,   right more than left. A punctate acute infarct also seen in the right   occipital lobe. TWO XRAY VIEWS OF THE RIGHT HIP 9/12/2022 12:06 pm   Right hip arthroplasty with no unexpected findings. Unchanged left hip   osteoarthritis. Social history: Patient lives alone in a ranch home  with 4 steps  to enter with Rail  Tub shower grab bars    Equipment owned: [employer] [address] [employer],       [address] [facility]   How much difficulty turning over in bed?: [address]  How much difficulty sitting down on / standing up from a chair with arms?: A Lot  How much difficulty moving from lying on back to sitting on side of bed?: A Little  How much help from another person moving to and from a bed to a chair?: A Lot  How much help from another person needed to walk in hospital room?: A Little  How much help from another person for climbing 3-5 steps with a railing?: A Lot  AM-PAC Inpatient Mobility Raw Score : 15  AM-PAC Inpatient T-Scale Score : 39.45  Mobility Inpatient CMS 0-100% Score: 57.7  Mobility Inpatient CMS G-Code Modifier : CK    Nursing cleared patient for PT evaluation. The admitting diagnosis and active problem list as listed above have been reviewed prior to the initiation of this evaluation. OBJECTIVE;   Initial Evaluation  Date: 9/29/2022 Treatment Date:     Short Term/ Long Term   Goals   Was pt agreeable to Eval/treatment?  Yes  To be met in 4 days   Pain level   0/10        Bed Mobility    Rolling: Not assessed     Supine to sit: Minimal assist of 1    Sit to supine: Not assessed     Scooting: Minimal assist of 1    Rolling: Independent    Supine to sit: Independent    Sit to supine: Independent    Scooting: Independent     Transfers Sit to stand: Minimal assist of 1 from bed, moderate assist from low surface  Sit to stand: Modified Independent     Ambulation     1 x 40 feet, 1 x 20 feet using  wheeled walker with Minimal assist of 1   for safety and cues for upright posture and pacing    100 feet using  wheeled walker with Modified Independent    Stair negotiation: ascended and descended   Not assessed     4 steps 1 rail with supervision    ROM    Left foot drop    Increase range of motion 10% of affected joints    Strength BUE:  refer to OT eval  RLE:  3+/5  LLE:  3/5 DF 2/5  Increase strength in affected mm groups by 1/3 grade   Balance Sitting EOB:  fair +  Dynamic Standing:  fair wheeled walker   Sitting EOB:  good    Dynamic Standing: good wheeled walker      Patient is Alert & Oriented x person, place, time, and situation and follows directions    Sensation:  Patient  denies numbness/tingling   Edema:  no   Endurance: fair       Vitals: room air   Blood Pressure at rest  Blood Pressure during session    Heart Rate at rest 84 Heart Rate during session      SPO2 at rest 96%  SPO2 during session 95%     Patient education  Patient educated on role of Physical Therapy, risks of immobility, safety and plan of care,  importance of mobility while in hospital , and ankle pumps, quad set and glut set for edema control, blood clot prevention     Patient response to education:   Pt verbalized understanding Pt demonstrated skill Pt requires further education in this area   Yes Partial Yes      Treatment:  Patient practiced and was instructed/facilitated in the following treatment: Patient assisted to edge of bed,   Sat edge of bed 10 minutes with Supervision  to increase dynamic sitting balance and activity tolerance. Ambualted in room, assisted to bathroom, increased assist required from low commode. Assisted up to chair. Performed exercises.        Therapeutic Exercises: ankle pumps, heel raises, long arc quad, and seated marching  x 15 reps. At end of session, patient in chair with     call light and phone within reach,  all lines and tubes intact, nursing notified. Patient would benefit from continued skilled Physical Therapy to improve functional independence and quality of life. Patient's/ family goals   home    Time in  1349  Time out  1437    Total Treatment Time  28 minutes    Evaluation time includes thorough review of current medical information, gathering information on past medical history/social history and prior level of function, completion of standardized testing/informal observation of tasks, assessment of data, and development of Plan of care and goals.      CPT codes:  Low Complexity PT evaluation (06905)  Therapeutic activities (13169)   15 minutes  1 unit(s)  Therapeutic exercises (48909)   8 minutes  1 unit(s)  Non billable time 5 minutes    Juanita Holter, PT

## 2022-09-29 NOTE — PROGRESS NOTES
Comprehensive Nutrition Assessment    Type and Reason for Visit:  Initial, Positive Nutrition Screen    Nutrition Recommendations/Plan:   Continue current diet & will modify ONS(Protein Modular) TID to Gelatein 20 once daily & Magic Cup BID. Continue current diet     Malnutrition Assessment:  Malnutrition Status: At risk for malnutrition (Comment) (09/29/22 1509)    Context:  Chronic Illness     Findings of the 6 clinical characteristics of malnutrition:  Energy Intake:  Unable to assess  Weight Loss:  Unable to assess (no wt hx in EMR.)     Body Fat Loss:  Unable to assess (COVID)     Muscle Mass Loss:  Unable to assess (COVID)    Fluid Accumulation:  No significant fluid accumulation     Strength:  Not Performed    Nutrition Assessment:    Pt admits w/ fatigue dx w/ Acute on chronic renal failure, anemia w/ multiple electrolyte disturbance & acidosis/ COVID. Hx 8/2022 recent multifocal CVA, chronic residual left-sided deficits, COPD,  HTN. No meal intake noted. Will modify current ONS, a protein modular TID. Will order Gelatein 20  & Magic Cup BID. Continue to monitor. Nutrition Related Findings:    A/O x4, soft/nontender abd +BS I/O -1.3L, no edema noted, Elevated renal labs, GFR 21, Na+ 130(L), Mg 1.3(L), Wound Type: None       Current Nutrition Intake & Therapies:    Average Meal Intake: Unable to assess  Average Supplements Intake: Unable to assess  ADULT DIET; Regular  ADULT ORAL NUTRITION SUPPLEMENT; Breakfast, Lunch, Dinner; Protein Modular    Anthropometric Measures:  Height: 5' 7.5\" (171.5 cm)  Ideal Body Weight (IBW): 151 lbs (69 kg)    Admission Body Weight: 164 lb (74.4 kg) (9/28 bed)  Current Body Weight: 164 lb (74.4 kg), 108.6 % IBW. Current BMI (kg/m2): 25.3        Weight Adjustment For: No Adjustment    BMI Categories: Overweight (BMI 25.0-29. 9)    Estimated Daily Nutrient Needs:  Energy Requirements Based On: Formula  Weight Used for Energy Requirements: Current  Energy (kcal/day): 8933-4774  Weight Used for Protein Requirements: Current  Protein (g/day): 60-75 (.8-1 gm/kg review renal  labs)  Method Used for Fluid Requirements: 1 ml/kcal  Fluid (ml/day): 2213-6406    Nutrition Diagnosis:   Inadequate oral intake related to renal dysfunction (COPD) as evidenced by lab values    Nutrition Interventions:   Food and/or Nutrient Delivery: Continue Current Diet, Modify Oral Nutrition Supplement  Nutrition Education/Counseling: No recommendation at this time  Coordination of Nutrition Care: Continue to monitor while inpatient     Goals:     Goals: PO intake 75% or greater     Nutrition Monitoring and Evaluation:   Behavioral-Environmental Outcomes: None Identified  Food/Nutrient Intake Outcomes: Food and Nutrient Intake, Supplement Intake  Physical Signs/Symptoms Outcomes: Biochemical Data, GI Status, Fluid Status or Edema, Weight, Skin, Nutrition Focused Physical Findings    Discharge Planning:     Too soon to determine     Karri Arreola RD  Contact: [phone]

## 2022-09-29 NOTE — CARE COORDINATION
9/29/22 1403 CM note: COVID (+) 9/28/22. Room air. Per GI plan for non-emergent EGD & c-scope when acute covid issues are resolved. Hgb 6.8 on admit; pt got 1 unit PRBC; hgb currently 7.4. Attempted to call pt via his hospital phone but no answer. Called and LVM on pts cellphone ([phone]) requesting a return call- awaiting his response. Per prior chart notes pt had hip surgery on 8/13/22 after a fall at home. On 8/17/22 pt was transferred to [facility]. Pt had a stroke after his hip surgery and went to [facility] after his discharge from [facility]. Pt lives alone and active with [facility] for PT only prior to his admit to the hospital. DME: [employer], [facility], [address] [facility] chair. NEED TO COMPLETE ACP. Awaiting therapy evals. CM will follow.  Electronically signed by Lan Augustine RN on 9/29/2022 at 2:28 PM

## 2022-09-30 LAB
ADENOVIRUS BY PCR: NOT DETECTED
ALBUMIN SERPL-MCNC: 2.2 G/DL (ref 3.5–5.2)
ALP BLD-CCNC: 131 U/L (ref 40–129)
ALT SERPL-CCNC: 27 U/L (ref 0–40)
ANION GAP SERPL CALCULATED.3IONS-SCNC: 12 MMOL/L (ref 7–16)
AST SERPL-CCNC: 25 U/L (ref 0–39)
BASOPHILS ABSOLUTE: 0.02 E9/L (ref 0–0.2)
BASOPHILS RELATIVE PERCENT: 0.2 % (ref 0–2)
BILIRUB SERPL-MCNC: 0.4 MG/DL (ref 0–1.2)
BLOOD BANK DISPENSE STATUS: NORMAL
BLOOD BANK PRODUCT CODE: NORMAL
BORDETELLA PARAPERTUSSIS BY PCR: NOT DETECTED
BORDETELLA PERTUSSIS BY PCR: NOT DETECTED
BPU ID: NORMAL
BUN BLDV-MCNC: 49 MG/DL (ref 6–23)
CALCIUM SERPL-MCNC: 8.9 MG/DL (ref 8.6–10.2)
CHLAMYDOPHILIA PNEUMONIAE BY PCR: NOT DETECTED
CHLORIDE BLD-SCNC: 97 MMOL/L (ref 98–107)
CO2: 20 MMOL/L (ref 22–29)
CORONAVIRUS 229E BY PCR: NOT DETECTED
CORONAVIRUS HKU1 BY PCR: NOT DETECTED
CORONAVIRUS NL63 BY PCR: NOT DETECTED
CORONAVIRUS OC43 BY PCR: NOT DETECTED
CREAT SERPL-MCNC: 2.5 MG/DL (ref 0.7–1.2)
DESCRIPTION BLOOD BANK: NORMAL
EOSINOPHILS ABSOLUTE: 0.1 E9/L (ref 0.05–0.5)
EOSINOPHILS RELATIVE PERCENT: 0.8 % (ref 0–6)
GFR AFRICAN AMERICAN: 31
GFR NON-AFRICAN AMERICAN: 26 ML/MIN/1.73
GLUCOSE BLD-MCNC: 90 MG/DL (ref 74–99)
HCT VFR BLD CALC: 20.9 % (ref 37–54)
HCT VFR BLD CALC: 23.3 % (ref 37–54)
HCT VFR BLD CALC: 24.2 % (ref 37–54)
HEMOGLOBIN: 7 G/DL (ref 12.5–16.5)
HEMOGLOBIN: 7.7 G/DL (ref 12.5–16.5)
HEMOGLOBIN: 7.9 G/DL (ref 12.5–16.5)
HUMAN METAPNEUMOVIRUS BY PCR: NOT DETECTED
HUMAN RHINOVIRUS/ENTEROVIRUS BY PCR: NOT DETECTED
IMMATURE GRANULOCYTES #: 0.07 E9/L
IMMATURE GRANULOCYTES %: 0.5 % (ref 0–5)
INFLUENZA A BY PCR: NOT DETECTED
INFLUENZA B BY PCR: NOT DETECTED
LYMPHOCYTES ABSOLUTE: 0.65 E9/L (ref 1.5–4)
LYMPHOCYTES RELATIVE PERCENT: 4.9 % (ref 20–42)
MAGNESIUM: 1.5 MG/DL (ref 1.6–2.6)
MCH RBC QN AUTO: 30.4 PG (ref 26–35)
MCHC RBC AUTO-ENTMCNC: 33.5 % (ref 32–34.5)
MCV RBC AUTO: 90.9 FL (ref 80–99.9)
MONOCYTES ABSOLUTE: 1.23 E9/L (ref 0.1–0.95)
MONOCYTES RELATIVE PERCENT: 9.2 % (ref 2–12)
MYCOPLASMA PNEUMONIAE BY PCR: NOT DETECTED
NEUTROPHILS ABSOLUTE: 11.26 E9/L (ref 1.8–7.3)
NEUTROPHILS RELATIVE PERCENT: 84.4 % (ref 43–80)
PARAINFLUENZA VIRUS 1 BY PCR: NOT DETECTED
PARAINFLUENZA VIRUS 2 BY PCR: NOT DETECTED
PARAINFLUENZA VIRUS 3 BY PCR: NOT DETECTED
PARAINFLUENZA VIRUS 4 BY PCR: NOT DETECTED
PDW BLD-RTO: 14.6 FL (ref 11.5–15)
PHOSPHORUS: 2.8 MG/DL (ref 2.5–4.5)
PLATELET # BLD: 452 E9/L (ref 130–450)
PMV BLD AUTO: 9.9 FL (ref 7–12)
POTASSIUM SERPL-SCNC: 3.4 MMOL/L (ref 3.5–5)
RBC # BLD: 2.3 E12/L (ref 3.8–5.8)
RESPIRATORY SYNCYTIAL VIRUS BY PCR: NOT DETECTED
SARS-COV-2, PCR: DETECTED
SODIUM BLD-SCNC: 129 MMOL/L (ref 132–146)
TOTAL PROTEIN: 6 G/DL (ref 6.4–8.3)
WBC # BLD: 13.3 E9/L (ref 4.5–11.5)

## 2022-09-30 PROCEDURE — 80053 COMPREHEN METABOLIC PANEL: CPT

## 2022-09-30 PROCEDURE — 97535 SELF CARE MNGMENT TRAINING: CPT

## 2022-09-30 PROCEDURE — 97530 THERAPEUTIC ACTIVITIES: CPT

## 2022-09-30 PROCEDURE — 1200000000 HC SEMI PRIVATE

## 2022-09-30 PROCEDURE — 85014 HEMATOCRIT: CPT

## 2022-09-30 PROCEDURE — 36430 TRANSFUSION BLD/BLD COMPNT: CPT

## 2022-09-30 PROCEDURE — 6360000002 HC RX W HCPCS: Performed by: INTERNAL MEDICINE

## 2022-09-30 PROCEDURE — 97165 OT EVAL LOW COMPLEX 30 MIN: CPT

## 2022-09-30 PROCEDURE — 84100 ASSAY OF PHOSPHORUS: CPT

## 2022-09-30 PROCEDURE — 6370000000 HC RX 637 (ALT 250 FOR IP): Performed by: INTERNAL MEDICINE

## 2022-09-30 PROCEDURE — 6360000002 HC RX W HCPCS: Performed by: HOSPITALIST

## 2022-09-30 PROCEDURE — 2580000003 HC RX 258: Performed by: INTERNAL MEDICINE

## 2022-09-30 PROCEDURE — 85025 COMPLETE CBC W/AUTO DIFF WBC: CPT

## 2022-09-30 PROCEDURE — 2580000003 HC RX 258: Performed by: HOSPITALIST

## 2022-09-30 PROCEDURE — 97110 THERAPEUTIC EXERCISES: CPT

## 2022-09-30 PROCEDURE — 94640 AIRWAY INHALATION TREATMENT: CPT

## 2022-09-30 PROCEDURE — 2060000000 HC ICU INTERMEDIATE R&B

## 2022-09-30 PROCEDURE — 83735 ASSAY OF MAGNESIUM: CPT

## 2022-09-30 PROCEDURE — 6370000000 HC RX 637 (ALT 250 FOR IP): Performed by: HOSPITALIST

## 2022-09-30 PROCEDURE — 36415 COLL VENOUS BLD VENIPUNCTURE: CPT

## 2022-09-30 PROCEDURE — 85018 HEMOGLOBIN: CPT

## 2022-09-30 RX ORDER — SODIUM CHLORIDE 9 MG/ML
INJECTION, SOLUTION INTRAVENOUS PRN
Status: DISCONTINUED | OUTPATIENT
Start: 2022-09-30 | End: 2022-10-03 | Stop reason: HOSPADM

## 2022-09-30 RX ORDER — AMMONIUM LACTATE 12 G/100G
CREAM TOPICAL
Status: DISCONTINUED | OUTPATIENT
Start: 2022-09-30 | End: 2022-10-03 | Stop reason: HOSPADM

## 2022-09-30 RX ORDER — POTASSIUM CHLORIDE 20 MEQ/1
20 TABLET, EXTENDED RELEASE ORAL ONCE
Status: COMPLETED | OUTPATIENT
Start: 2022-09-30 | End: 2022-09-30

## 2022-09-30 RX ORDER — MAGNESIUM SULFATE 1 G/100ML
1000 INJECTION INTRAVENOUS ONCE
Status: COMPLETED | OUTPATIENT
Start: 2022-09-30 | End: 2022-09-30

## 2022-09-30 RX ADMIN — ALBUTEROL SULFATE 2.5 MG: 2.5 SOLUTION RESPIRATORY (INHALATION) at 16:26

## 2022-09-30 RX ADMIN — POTASSIUM CHLORIDE 20 MEQ: 20 TABLET, EXTENDED RELEASE ORAL at 17:58

## 2022-09-30 RX ADMIN — MAGNESIUM SULFATE HEPTAHYDRATE 1000 MG: 1 INJECTION, SOLUTION INTRAVENOUS at 17:58

## 2022-09-30 RX ADMIN — Medication 10 ML: at 11:16

## 2022-09-30 RX ADMIN — ARFORMOTEROL TARTRATE 15 MCG: 15 SOLUTION RESPIRATORY (INHALATION) at 16:26

## 2022-09-30 RX ADMIN — PANTOPRAZOLE SODIUM 40 MG: 40 TABLET, DELAYED RELEASE ORAL at 17:52

## 2022-09-30 RX ADMIN — BUDESONIDE 500 MCG: 0.5 SUSPENSION RESPIRATORY (INHALATION) at 16:26

## 2022-09-30 RX ADMIN — Medication 2000 UNITS: at 10:36

## 2022-09-30 RX ADMIN — ALBUTEROL SULFATE 2.5 MG: 2.5 SOLUTION RESPIRATORY (INHALATION) at 05:52

## 2022-09-30 RX ADMIN — BUDESONIDE 500 MCG: 0.5 SUSPENSION RESPIRATORY (INHALATION) at 05:51

## 2022-09-30 RX ADMIN — ATORVASTATIN CALCIUM 80 MG: 40 TABLET, FILM COATED ORAL at 19:38

## 2022-09-30 RX ADMIN — ARFORMOTEROL TARTRATE 15 MCG: 15 SOLUTION RESPIRATORY (INHALATION) at 05:51

## 2022-09-30 RX ADMIN — ASPIRIN 81 MG CHEWABLE TABLET 81 MG: 81 TABLET CHEWABLE at 10:37

## 2022-09-30 RX ADMIN — PANTOPRAZOLE SODIUM 40 MG: 40 TABLET, DELAYED RELEASE ORAL at 06:30

## 2022-09-30 RX ADMIN — SODIUM CHLORIDE 125 MG: 9 INJECTION, SOLUTION INTRAVENOUS at 11:00

## 2022-09-30 ASSESSMENT — PAIN SCALES - GENERAL
PAINLEVEL_OUTOF10: 0
PAINLEVEL_OUTOF10: 0

## 2022-09-30 NOTE — PROGRESS NOTES
Physical Therapy Treatment Note/Plan of Care    Room #:  1027/9310-88  Patient Name: Fide Street  YOB: 1950  MRN: [id number]    Date of Service: 9/30/2022     Tentative placement recommendation: Subacute vs Home Health Physical Therapy if patient meets goals  Equipment recommendation: Patient has needed equipment       Evaluating Physical Therapist: Alba Golden, PT #[id number]      Specific Provider Orders/Date/Referring Provider :  09/28/22 1400    PT eval and treat  Start:  09/28/22 1400,   End:  09/28/22 1400,   ONE TIME,   Standing Count:  1 Occurrences,   R         True Holiday, DO     Admitting Diagnosis:   Hypomagnesemia [E83.42]  Hyponatremia [E87.1]  Symptomatic anemia [D64.9]  Fatigue, unspecified type [R53.83]  Chronic kidney disease, unspecified CKD stage [N18.9]  Acute renal failure superimposed on stage 4 chronic kidney disease, unspecified acute renal failure type ([facility] [address] [N17.9, N18.4]  Acute on chronic anemia [D64.9]     Fatigue    Acute on chronic anemia    Surgery: none R hip hemiarthroplasty on 8/13/22  Visit Diagnoses         Codes    Acute on chronic anemia    -  Primary D64.9    Symptomatic anemia     D64.9    Fatigue, unspecified type     R53.83    Chronic kidney disease, unspecified CKD stage     N18.9    Hyponatremia     E87.1    Hypomagnesemia     E83.42            Patient Active Problem List   Diagnosis    Closed right hip fracture, initial encounter ([facility] [address]    Hip fracture ([facility] [address]    Left foot drop    Stroke (cerebrum) ([facility] [address]    Acute renal failure superimposed on stage 4 chronic kidney disease, unspecified acute renal failure type ([address]    Acute renal failure (ARF) ([facility] [address]        ASSESSMENT of Current Deficits Patient exhibits decreased strength, balance, endurance, and range of motion impairing functional mobility, transfers, gait , gait distance, and tolerance to activity holds left knee in flexion in bed, reports unable to extend however able to extend during gait with impaired terminal knee extension control. Patient needed several attempts to stand from the chair before standing on his own due to impaired strength. Pt fatigued from sitting up and just wanted to get back into the bed. Patient requires continued skilled physical therapy to address concerns listed above for increased safety and function at discharge. PHYSICAL THERAPY  PLAN OF CARE       Physical therapy plan of care is established based on physician order,  patient diagnosis and clinical assessment    Current Treatment Recommendations:    -Standing Balance: Perform strengthening exercises in standing to promote motor control with or without upper extremity support   -Transfers: Provide instruction on proper hand and foot position for adequate transfer of weight onto lower extremities and use of gait device if needed and Cues for hand placement, technique and safety. Provide stabilization to prevent fall   -Gait: Gait training, Standing activities to improve: base of support, weight shift, weight bearing , and Pregait training to emphasize: Sequencing , Upright, and Safety   -Endurance: Utilize Supervised activities to increase level of endurance to allow for safe functional mobility including transfers and gait   -Stairs: Stair training with instruction on proper technique and hand placement on rail    PT long term treatment goals are located in below grid    Patient and or family understand(s) diagnosis, prognosis, and plan of care. Frequency of treatments: Patient will be seen  daily.          Prior Level of Function: Patient ambulated with wheeled walker    Rehab Potential: fair + for baseline    Past medical history:   Past Medical History:   Diagnosis Date    Diverticulitis     Gout     Hypertension     Tobacco abuse      Past Surgical History:   Procedure Laterality Date    APPENDECTOMY      CATARACT REMOVAL WITH IMPLANT Right 2 2 15    CATARACT REMOVAL WITH IMPLANT Left 4/6/15    COLONOSCOPY  7 years ago    Dr Kyle Roberts Right 8/13/2022    RIGHT HIP HEMIARTHROPLASTY performed by Lg Melo, DO at [address]      child       SUBJECTIVE:    Precautions: Up with assistance, falls, alarm, Droplet plus/COVID-19, and hip precautions ,  R hip hemiarthroplasty 8/13/2022,   MRI OF THE BRAIN WITHOUT AND WITH CONTRAST  8/15/2022 12:01 pm  1. Tiny acute infarcts are seen involving the parietal lobes bilaterally,   right more than left. A punctate acute infarct also seen in the right   occipital lobe. TWO XRAY VIEWS OF THE RIGHT HIP 9/12/2022 12:06 pm   Right hip arthroplasty with no unexpected findings. Unchanged left hip   osteoarthritis. Social history: Patient lives alone in a ranch home  with 4 steps  to enter with Rail  Tub shower grab bars    Equipment owned: [employer],       [facility]   How much difficulty turning over in bed?: A Little  How much difficulty sitting down on / standing up from a chair with arms?: A Little  How much difficulty moving from lying on back to sitting on side of bed?: A Little  How much help from another person moving to and from a bed to a chair?: A Little  How much help from another person needed to walk in hospital room?: A Little  How much help from another person for climbing 3-5 steps with a railing?: A Lot  AM-PAC Inpatient Mobility Raw Score : 17  AM-PAC Inpatient T-Scale Score : 42.13  Mobility Inpatient CMS 0-100% Score: 50.57  Mobility Inpatient CMS G-Code Modifier : CK    Nursing cleared patient for PT treatment. OBJECTIVE;   Initial Evaluation  Date: 9/29/2022 Treatment Date:  9/30/2022       Short Term/ Long Term   Goals   Was pt agreeable to Eval/treatment?  Yes yes To be met in 4 days   Pain level   0/10    0/10    Bed Mobility    Rolling: Not assessed     Supine to sit: Minimal assist of 1    Sit to supine: Not assessed     Scooting: Minimal assist of 1   Rolling: Not assessed patient in chair   Supine to sit: Not assessed patient in chair   Sit to supine: Supervision    Scooting: Not assessed patient in chair    Rolling: Independent    Supine to sit: Independent    Sit to supine: Independent    Scooting: Independent     Transfers Sit to stand: Minimal assist of 1 from bed, moderate assist from low surface Sit to stand: Supervision  Cues for hand placement and safety     Sit to stand: Modified Independent     Ambulation     1 x 40 feet, 1 x 20 feet using  wheeled walker with Minimal assist of 1   for safety and cues for upright posture and pacing 12 feet using  wheeled walker with Minimal assist of 1   cues for upright posture, increased base of support, increased step length, safety, pacing, and hip precautions.      100 feet using  wheeled walker with Modified Independent    Stair negotiation: ascended and descended   Not assessed     4 steps 1 rail with supervision    ROM    Left foot drop    Increase range of motion 10% of affected joints    Strength BUE:  refer to OT eval  RLE:  3+/5  LLE:  3/5 DF 2/5  Increase strength in affected mm groups by 1/3 grade   Balance Sitting EOB:  fair +  Dynamic Standing:  fair wheeled walker  Sitting EOB: good   Dynamic Standing: fair wheeled walker   Sitting EOB:  good    Dynamic Standing: good wheeled walker      Patient is Alert & Oriented x person, place, time, and situation and follows directions    Sensation:  Patient  denies numbness/tingling   Edema:  no   Endurance: fair       Vitals: room air   Blood Pressure at rest  Blood Pressure during session    Heart Rate at rest  Heart Rate during session      SPO2 at rest %  SPO2 during session %     Patient education  Patient educated on role of Physical Therapy, risks of immobility, safety and plan of care,  importance of mobility while in hospital , and ankle pumps, quad set and glut set for edema control, blood clot prevention     Patient response to education:   Pt verbalized understanding Pt demonstrated skill Pt requires further education in this area   Yes Partial Yes      Treatment:  Patient practiced and was instructed/facilitated in the following treatment: Patient sitting in a chair at start of tx session. Pt stood, ambulated in the room, and back to the edge of the bed. Pt  Sat edge of bed 10 minutes with Supervision  to increase dynamic sitting balance and activity tolerance. Returned to supine and performed supine exercises. Therapeutic Exercises:  ankle pumps, quad sets, heel slide, hip abduction/adduction, and straight leg raise,  x 15 reps. AROM    At end of session, patient in bed with     call light and phone within reach,  all lines and tubes intact, nursing notified. Patient would benefit from continued skilled Physical Therapy to improve functional independence and quality of life. Patient's/ family goals   home    Time in  10:42  Time out  11:05    Total Treatment Time  23 minutes        CPT codes:    Therapeutic activities (28424)   13 minutes  1 unit(s)  Therapeutic exercises (54554)   10 minutes  1 unit(s)    Eder Caro  [facility]  LIC # [id number]

## 2022-09-30 NOTE — PROGRESS NOTES
2366 This nurse sent message to Dr. Osker Phalen to alert of abnormal labs, awaiting response. 1040 Assessment complete. Patient pleasant and cooperative at this time, denies pain or discomfort at this time. Dr. Joselito Bingham in today, this nurse discussed with him abnormal labs, new orders placed in [facility]. Will continue to monitor. [address] Patient alerted this nurse that when he urinates he feels urine coming around catheter, when this nurse examined patient bed pad, urine was noted. This nurse removed catheter, Patient tolerated well, will call nephrology to see if another should be placed. This nurse contacted Dr. Osker Phalen, MD ok with patient using urinal instead of replacing chmapion. Will continue to monitor.

## 2022-09-30 NOTE — PROGRESS NOTES
NEPHROLOGY Attending   Progress Note  9/30/2022 12:08 PM  Subjective:   Admit Date: 9/28/2022  PCP: Andrzej Henderson DO    Interval History:     9/30/2022: Patient remains in [facility] isolation protocol - all aspects of care reviewed with Cheri Garcia the primary RN - upon my exam patient sitting up in bed eating lunch in no acute distress -1 unit PRBC infusing - patient complaining of generalized weakness and fatigue      Diet: ADULT DIET; Regular  ADULT ORAL NUTRITION SUPPLEMENT; Breakfast; Other Oral Supplement; Gelatein 20  ADULT ORAL NUTRITION SUPPLEMENT; Lunch, Dinner; Frozen Oral Supplement    Data:   Scheduled Meds:   pantoprazole  40 mg Oral BID AC    atorvastatin  80 mg Oral Nightly    Vitamin D  2,000 Units Oral Daily    [Held by provider] clopidogrel  75 mg Oral Daily    [Held by provider] hydroxychloroquine  200 mg Oral Daily    budesonide  0.5 mg Nebulization BID    Arformoterol Tartrate  15 mcg Nebulization BID    sodium chloride flush  5-40 mL IntraVENous 2 times per day    aspirin  81 mg Oral Daily     Continuous Infusions:   sodium chloride      sodium chloride 1,000 mL (09/29/22 1720)    sodium chloride       PRN Meds:sodium chloride, albuterol, sodium chloride flush, sodium chloride, ondansetron **OR** ondansetron, acetaminophen **OR** acetaminophen, polyethylene glycol    Intake/Output Summary (Last 24 hours) at 9/30/2022 1208  Last data filed at 9/30/2022 5849  Gross per 24 hour   Intake 240 ml   Output 1100 ml   Net -860 ml     CBC:   Recent Labs     09/28/22  1308 09/29/22  0039 09/29/22  0744 09/29/22  1413 09/29/22  2159 09/30/22  0637   WBC 15.2*  --  14.2*  --   --  13.3*   HGB 6.8*   < > 7.4* 7.7* 7.2* 7.0*   *  --  466*  --   --  452*    < > = values in this interval not displayed.      BMP:    Recent Labs     09/28/22  1308 09/29/22  0744 09/30/22  0637   * 130* 129*   K 3.8 3.7 3.4*   CL 91* 95* 97*   CO2 21* 21* 20*   BUN 72* 57* 49*   CREATININE 4.2* 3.0* 2.5*   GLUCOSE 153* 117* 90     Hepatic:   Recent Labs     09/28/22  1308 09/29/22  0744 09/30/22  0637   AST 28 19 25   ALT 30 24 27   BILITOT 0.2 0.5 0.4   ALKPHOS 129 126 131*     Troponin: No results for input(s): TROPONINI in the last 72 hours. BNP: No results for input(s): BNP in the last 72 hours. Lipids:   Recent Labs     09/29/22  0744   CHOL 61   HDL 22     ABGs: No results found for: PHART, PO2ART, SYL7HAM  INR: No results for input(s): INR in the last 72 hours. -----------------------------------------------------------------  RAD: CT ABDOMEN PELVIS WO CONTRAST Additional Contrast? Oral    Result Date: 9/29/2022  EXAMINATION: CT OF THE ABDOMEN AND PELVIS WITHOUT CONTRAST 9/29/2022 6:59 pm TECHNIQUE: CT of the abdomen and pelvis was performed without the administration of intravenous contrast. Multiplanar reformatted images are provided for review. Automated exposure control, iterative reconstruction, and/or weight based adjustment of the mA/kV was utilized to reduce the radiation dose to as low as reasonably achievable. COMPARISON: CT dated 08/11/2021 HISTORY: ORDERING SYSTEM PROVIDED HISTORY: evaluate kidneys TECHNOLOGIST PROVIDED HISTORY: Reason for exam:->evaluate kidneys Additional Contrast?->Oral FINDINGS: Lower Chest: Lung bases are clear. Organs: Liver without focal lesion. Gallbladder unremarkable. Pancreas and spleen unremarkable. Adrenals without nodule. Kidneys without suspicious renal lesion and no hydronephrosis. Stable appearance of left renal cortical cysts from prior comparison without abnormal complex features on limited noncontrast evaluation. GI/Bowel: No focal thickening or disproportion dilatation of bowel. No inflammatory findings. Intraluminal contrast extends through the colonic segments and rectum. Pelvis: No suspicious pelvic lesion or bulky pelvic adenopathy/free fluid. Peritoneum/Retroperitoneum: No bulky retroperitoneal adenopathy.  No suspicious peritoneal or mesenteric process Vasculature: Grossly normal caliber of abdominal aorta and vasculature Bones/Soft Tissues: No acute osseous or soft tissue findings. Right hip arthroplasty in place with associated beam hardening artifact. Kidneys demonstrate stable appearance from prior comparison 08/11/2021 with left simple appearing renal cortical cystic lesions. No obstructing uropathy or complex features associated however limited by lack of intravenous contrast.  No acute findings of the abdomen or pelvis otherwise identified         Objective:   Vitals:   Vitals:    09/30/22 1000   BP:    Pulse: 74   Resp:    Temp:    SpO2:      Patient Vitals for the past 24 hrs:   BP Temp Temp src Pulse Resp SpO2 Height   09/30/22 1000 -- -- -- 74 -- -- --   09/30/22 0907 (!) 125/53 98.8 °F (37.1 °C) Oral 74 20 97 % --   09/30/22 0630 (!) 119/54 98.9 °F (37.2 °C) Oral 99 18 98 % --   09/30/22 0058 -- -- -- 99 -- 98 % --   09/29/22 2036 (!) 127/51 98.8 °F (37.1 °C) Oral 99 18 98 % --   09/29/22 1600 (!) 164/68 98.7 °F (37.1 °C) Oral 99 18 95 % --   09/29/22 1431 -- -- -- -- -- -- 5' 7.5\" (1.715 m)     Gen: alert, awake, nad  Skin: no rash, turgor wnl  Heent:  eomi, mmm  Neck: no bruits or jvd noted  Cardiovascular:  S1, S2 without m/r/g  Respiratory: Diminished but clear  Abdomen:  +bs, soft, nt, nd  Ext: No edema  Psychiatric: mood and affect appropriate  Musculoskeletal:  Rom, muscular strength intact      Assessment/Plan:     1) CAYETANO. Likely secondary renal hypoperfusion in the setting of decreased effective circulating volume due to profound anemia - FENa 1.2% supporting prerenal cause - CT scan from 9/29/2022 shows no hydronephrosis or obstructing uropathy - Scr 4.2 upon admission has trended to 2.5 -continue current IV fluids - strict intake and output     2) CKD Stage 3. Based on history of hypertension - Baseline scr 1.3 -> 1.6     3) Hyponatremia. euvolemic upon arrival -follow response to current IV fluids     4) Hypokalemia.  Mild - Supplement K as needed for goal >4.0 - follow magnesium closely as well     5)  Anemia. History of CKD - GI following -receiving 1 unit PRBC on 930 -was on IV Ferrlecit -follow H&H            VLADIMIR Guallpa - CNP    Patient seen and examined. No family member is present at the bedside. Chart reviewed. I had a face to face encounter with the patient. Agree with exam.    Agree with  formulation, assessment and plan as outlined above and directed by me. Addition and corrections were done as deemed appropriate. My exam and plan include:     Continue current treatment as outlined above. Patient once again with hemoglobin trending downwards. Need to check stools for occult blood. GI following the patient. Transfuse for hemoglobin less than 7 g/dL.          King Mosley MD  Nephrology        Electronically signed by King Mosley MD on 9/30/2022 at 3:11 PM

## 2022-09-30 NOTE — PROGRESS NOTES
Internal Medicine Progress Note    TO=[facility]    Greg Franco. Lisandra Kam., ROSHAN.A.CLalitaOLalitaI. Jayla Fishman D.O., JAVON Marion D.O. Alex Velázquez D.O. Beto Shipman, MSN, APRN, NP-C  Elian Penaloza. Anselm Merlin, MSN, APRN-CNP     Primary Care Physician: Roberta Charles DO   Admitting Physician:  Sharan Reyes DO  Admission date and time: 9/28/2022 12:09 PM    Room:  [address] [place] [address]  Admitting diagnosis: Hypomagnesemia [E83.42]  Hyponatremia [E87.1]  Symptomatic anemia [D64.9]  Fatigue, unspecified type [R53.83]  Chronic kidney disease, unspecified CKD stage [N18.9]  Acute renal failure superimposed on stage 4 chronic kidney disease, unspecified acute renal failure type ([facility] [address] [N17.9, N18.4]  Acute on chronic anemia [D64.9]    Patient Name: Mikayla Burgos  MRN: [id number]    Date of Service: 9/30/2022     Subjective: Declan Watkins is a 81 y.o. male who was seen and examined today,9/30/2022, at the bedside. Patient sitting up in chair. Overall seems improved today. Anxious for discharge. Has no symptomatology associated with COVID. We will transfuse. Electrolytes are improving. Denies chest pain or shortness of breath    No family present    Review of System:   Constitutional:   Denies fever or chills, weight loss or gain, positive for fatigue/ malaise. HEENT:   Denies ear pain, sore throat, sinus or eye problems. Cardiovascular:   Denies any chest pain, irregular heartbeats, or palpitations. Respiratory:   Denies shortness of breath, coughing, sputum production, hemoptysis, or wheezing. Gastrointestinal:   Denies nausea, vomiting, diarrhea, or constipation. Denies any abdominal pain. Genitourinary:    Denies any urgency, frequency, hematuria. Voiding  without difficulty with Whitley catheter. Extremities:   Denies lower extremity swelling, edema or cyanosis.    Neurology:    Denies any headache or focal neurological deficits, admits to generalized weakness  Psch:   Denies being anxious or depressed. Musculoskeletal:    Denies  myalgias, joint complaints or back pain. Integumentary:   Denies any rashes, ulcers, or excoriations. Denies bruising. Hematologic/Lymphatic:  Denies bruising or bleeding. Physical Exam:  I/O this shift: In: 594.2 [P.O.:240; Blood:354.2]  Out: 1100 [Urine:1100]    Intake/Output Summary (Last 24 hours) at 9/30/2022 1517  Last data filed at 9/30/2022 1500  Gross per 24 hour   Intake 594.17 ml   Output 2200 ml   Net -1605.83 ml   I/O last 3 completed shifts:  In: -   Out: 2200 [Urine:2200]  Patient Vitals for the past 96 hrs (Last 3 readings):   Weight   09/28/22 2113 164 lb 8 oz (74.6 kg)     Vital Signs:   Blood pressure (!) 125/58, pulse 78, temperature 98.4 °F (36.9 °C), temperature source Oral, resp. rate 20, height 5' 7.5\" (1.715 m), weight 164 lb 8 oz (74.6 kg), SpO2 97 %. General appearance:  Alert, responsive, oriented to person, place, and time. Well preserved, alert, no distress. Head:  Normocephalic. No masses, lesions or tenderness. Eyes:  PERRLA. EOMI. Sclera clear. ENT:  Ears normal. Mucosa normal.  Missing teeth. Eyeglasses. Impaired vision. Neck:    Supple. Trachea midline. No thyromegaly. No JVD. No bruits. Heart:    Rhythm regular. Rate controlled. No murmurs. Lungs:    Symmetrical. Clear to auscultation bilaterally. No wheezes. No rhonchi. No rales. Abdomen:   Soft. Non-tender. Non-distended. Bowel sounds positive. No organomegaly or masses. No pain on palpation. Extremities:    Peripheral pulses present. No peripheral edema. No ulcers. No cyanosis. No clubbing. Neurologic:    Alert x 3. No focal deficit. Cranial nerves grossly intact. No focal weakness. Psych:   Behavior is normal. Mood appears normal. Speech is not rapid and/or pressured. Musculoskeletal:   Spine ROM normal. Muscular strength intact. Gait not assessed. Integumentary:  No rashes.   Pressure ulcer of the left heel with eschar noted. Right heel callus. Genitalia/Breast:  Whitley catheter    Medication:  Scheduled Meds:   pantoprazole  40 mg Oral BID AC    atorvastatin  80 mg Oral Nightly    Vitamin D  2,000 Units Oral Daily    [Held by provider] clopidogrel  75 mg Oral Daily    [Held by provider] hydroxychloroquine  200 mg Oral Daily    budesonide  0.5 mg Nebulization BID    Arformoterol Tartrate  15 mcg Nebulization BID    sodium chloride flush  5-40 mL IntraVENous 2 times per day    aspirin  81 mg Oral Daily     Continuous Infusions:   sodium chloride      sodium chloride 1,000 mL (09/29/22 1720)    sodium chloride         Objective Data:  Recent Labs     09/28/22  1308 09/28/22  1452 09/29/22  0744 09/29/22  1413 09/29/22  2159 09/30/22  0637   WBC 15.2*  --  14.2*  --   --  13.3*   RBC 2.26*  --  2.46*  --   --  2.30*   HGB 6.8*   < > 7.4* 7.7* 7.2* 7.0*   HCT 21.1*   < > 22.2* 23.2* 22.2* 20.9*   MCV 93.4  --  90.2  --   --  90.9   MCH 30.1  --  30.1  --   --  30.4   MCHC 32.2  --  33.3  --   --  33.5   RDW 14.4  --  14.6  --   --  14.6   *  --  466*  --   --  452*   MPV 9.9  --  10.0  --   --  9.9    < > = values in this interval not displayed. Recent Labs     09/28/22  1308 09/29/22  0744 09/30/22  0637   * 130* 129*   K 3.8 3.7 3.4*   CL 91* 95* 97*   CO2 21* 21* 20*   BUN 72* 57* 49*   CREATININE 4.2* 3.0* 2.5*   GLUCOSE 153* 117* 90   CALCIUM 8.8 8.7 8.9   PROT 7.0 6.3* 6.0*   LABALBU 3.0* 2.7* 2.2*   BILITOT 0.2 0.5 0.4   ALKPHOS 129 126 131*   AST 28 19 25   ALT 30 24 27     No results found for: TROPONINI       Wound Documentation:   Patient has pressure ulcer of the left heel, Magallanes stage grade 1 which was present on admission. Patient also has right heel callus.     Assessment:  Acute on chronic renal failure stage IV-V, with multiple electrolyte disturbance and acidosis noted  Acute on chronic anemia without overt blood loss identified  Cerebrovascular disease with relatively recent multifocal CVA, chronic residual left-sided deficits   Pressure ulcer of the left heel, Magallanes stage grade 1 which was present on admission  Right heel callus  Hypomagnesia  Central vascular disease with total occlusion of the left ICA and has been of a carotid stent in the severely stenosed right internal carotid artery August 18, 2022 high-dose aspirin and Plavix therapy (Plavix end date 10/2/2022)  Hyperlipidemia  Hyperglycemia without documented history of diabetes, hemoglobin A1c 5.1%  Nonoxygen dependent COPD with chronic tobacco abuse  Hyperuricemia with history of gout on chronic allopurinol therapy  Recent hospitalization for right basicervical femoral neck fracture status post orthopedic repair 08/2022  Moderate to severe lumbar spinal stenosis  Essential hypertension       Plan:   Patient has been seen by Dr. Percy Escobedo for the heel ulcer  Transfuse for low hemoglobin of 7  Continue to monitor electrolytes and follow with nephrology  Symptomatic treatment of underlying COVID  Follow appropriate lab    More than 50% of my  time was spent at the bedside counseling/coordinating care with the patient and/or family with face to face contact. This time was spent reviewing notes and laboratory data as well as instructing and counseling the patient. Time I spent with the family or surrogate(s) is included only if the patient was incapable of providing the necessary information or participating in medical decisions. I also discussed the differential diagnosis and all of the proposed management plans with the patient and individuals accompanying the patient.     Ernst Ascencio DO, D.O., [facility]  3:17 PM  9/30/2022

## 2022-09-30 NOTE — PROGRESS NOTES
[address] [facility]  [facility] [place]. [place]        Date:2022                                                  Patient Name: Luis Felipe Starr    MRN: [id number]    : 1950    Room: [address] [place] [address]      Evaluating OT: Linda Ruano OTR/L #[id number]     Referring Provider and Specific Provider Orders/Date:      22 1400  OT eval and treat  Start:  22 1400,   End:  22 1400,   ONE TIME,   Standing Count:  1 Occurrences,   R         Livia Slider,       Placement Recommendation: Subacute vs Home Health Occupational Therapy if patient is able to meet goals        Diagnosis:   1. Acute on chronic anemia    2. Symptomatic anemia    3. Fatigue, unspecified type    4. Chronic kidney disease, unspecified CKD stage    5. Hyponatremia    6. Hypomagnesemia         Surgery: None      Pertinent Medical History:       Past Medical History:   Diagnosis Date    Diverticulitis     Gout     Hypertension     Tobacco abuse          Past Surgical History:   Procedure Laterality Date    APPENDECTOMY      CATARACT REMOVAL WITH IMPLANT Right 2 2 15    CATARACT REMOVAL WITH IMPLANT Left 4/6/15    COLONOSCOPY  7 years ago    Dr Vincent Gonzalez Right 2022    RIGHT HIP HEMIARTHROPLASTY performed by Molly Atkins DO at [address]      child     Precautions: Fall Risk, alarm, up with assistance, droplet plus isolation, COVID positive, R hip hemiarthroplasty 2022, hip precautions       MRI OF THE BRAIN WITHOUT AND WITH CONTRAST  8/15/2022 12:01 pm  1. Tiny acute infarcts are seen involving the parietal lobes bilaterally,   right more than left. A punctate acute infarct also seen in the right   occipital lobe. TWO XRAY VIEWS OF THE RIGHT HIP 2022 12:06 pm   Right hip arthroplasty with no unexpected findings. Unchanged left hip   osteoarthritis.       Assessment of current deficits [x] Functional mobility  [x]ADLs  [x] Strength               []Cognition    [x] Functional transfers   [x] IADLs         [x] Safety Awareness   [x]Endurance    [] Fine Coordination              [x] Balance      [] Vision/perception   []Sensation     []Gross Motor Coordination  [] ROM  [] Delirium                   [] Motor Control     OT PLAN OF CARE   OT POC based on physician orders, patient diagnosis and results of clinical assessment    Frequency/Duration 1-3 days/wk for 2 weeks  PRN     Specific OT Treatment Interventions to include:   * Instruction/training on adapted ADL techniques and AE recommendations to increase functional independence within precautions       * Training on energy conservation strategies, correct breathing pattern and techniques to improve independence/tolerance for self-care routine  * Functional transfer/mobility training/DME recommendations for increased independence, safety, and fall prevention  * Patient/Family education to increase follow through with safety techniques and functional independence  * Recommendation of environmental modifications for increased safety with functional transfers/mobility and ADLs  * Therapeutic exercise to improve motor endurance, ROM, and functional strength for ADLs/functional transfers  * Therapeutic activities to facilitate/challenge dynamic balance, stand tolerance for increased safety and independence with ADLs    Recommended Adaptive Equipment: TBD     Home Living: Lives alone, single family home, 1 story, 4 steps to enter with rail. Bathroom set-up: tub/shower, grab bars. Equipment owned: cane, wheeled walker, elevated commode, shower chair. Prior Level of Function: pt reports being independent with ADLs at baseline, has assist with IADLs; ambulated independently. Pain Level: pt denied pain; Nursing notified.       Cognition: A&O: 4/4; Follows 3 step directions   Memory: fair    Sequencing: fair    Problem solving: fair Judgement/safety: fair     [facility]   AM-PAC Daily Activity Inpatient   How much help for putting on and taking off regular lower body clothing?: A Lot  How much help for Bathing?: A Lot  How much help for Toileting?: Total  How much help for putting on and taking off regular upper body clothing?: A Lot  How much help for taking care of personal grooming?: A Little  How much help for eating meals?: A Little  AM-PAC Inpatient Daily Activity Raw Score: 13  AM-PAC Inpatient ADL T-Scale Score : 32.03  ADL Inpatient CMS 0-100% Score: 63.03  ADL Inpatient CMS G-Code Modifier : CL     Functional Assessment:    Initial Eval Status  Date: 9/30/22   Treatment Status  Date: STGs = LTGs  Time frame: 10-14 days   Feeding Supervision    Independent    Grooming Minimal Assist    Moderate [place]    UB Dressing Moderate Assist    Moderate [place]    LB Dressing Maximal Assist   Using sock aide to don sock over feet     Moderate [place]    Bathing Moderate Assist    Moderate [place]    Toileting Dependent   For rear hygiene while standing at at bedside. Pt would benefit from 3:1 commode 2/2 hip precautions. Nursing aware. Moderate [place]    Bed Mobility  Supine to sit: Minimal Assist   Sit to supine: Not Assessed    Supine to sit: Independent   Sit to supine: Independent    Functional Transfers Sit to stand: Minimal Assist   Stand to sit: Minimal Assist     Transfer training with verbal cues for hand placement throughout session to improve safety. Independent    Functional Mobility Minimal Assist with wheeled walker to improve balance to/from bathroom, verbal cues for walker sequence and safety.      Moderate [place] with use of wheeled walker    Balance Sitting:     Static: good    Dynamic: fair plus   Standing: fair with walker     Sitting:     Static: good    Dynamic: good  Standing: good with walker    Activity Tolerance fair    Increase standing tolerance >3 minutes for improved engagement with functional transfers and indep in ADLs     Visual/  Perceptual Glasses: Yes      NA      Hand Dominance:      AROM (PROM) Strength Additional Info:  Goal:   RUE  WFL 4-/5 good  and wfl FMC/dexterity noted during ADL tasks   Improve overall RUE strength  for participation in functional tasks   LUE WFL 4-/5 good  and wfl FMC/dexterity noted during ADL tasks   Improve overall LUE strength  for participation in functional tasks     Hearing: [facility]   Sensation:  No c/o numbness or tingling  Tone: WFL   Edema:     Comments: RN cleared patient for OT. Upon arrival patient in supine. Therapist facilitated and instructed pt on adapted  techniques & compensatory strategies to improve safety and independence with basic ADLs, bed mobility, functional transfers and mobility to allow pt to achieve highest level of independence and safely. Pt demonstrated fair understanding of education & follow through. At end of session, patient was in chair with call light and phone within reach, all lines and tubes intact. Overall, patient demonstrated  decreased independence and safety during completion of ADL tasks. Pt would benefit from continued skilled OT to increase safety and independence with completion of ADL tasks and functional mobility for improved quality of life. Treatment: OT treatment provided this date includes:   Instructions/training on safety, sequencing, and adapted techniques for completion of ADLs. Facilitated bed mobility with cues for proper body mechanics and sequencing to prepare for ADL completion. Instruction/training on safe functional mobility/transfer techniques including hand and feet placement   Instruction/training on energy conservation/work simplification for completion of ADLs    Skilled monitoring of O2 sats - see section above     Rehab Potential: Good for established goals.       Patient / Family Goal: return home       Patient and/or family were instructed on functional diagnosis, prognosis/goals and OT plan of care. Demonstrated fair understanding. Eval Complexity: Low    Time In: 9:35 AM   Time Out: 10:20 AM    Total Treatment Time: 25       Min Units   OT Eval Low 97165  X  1    OT Eval Medium 29807      OT Eval High 33981      OT Re-Eval 74355            ADL/Self Care 24864 15 1   Therapeutic Activities 37123 10 1   Therapeutic Ex 83711       Orthotic Management 31331       Manual [id number]     Neuro Re-Ed 96782       Non-Billable Time        Evaluation Time additionally includes thorough review of current medical information, gathering information on past medical history/social history and prior level of function, interpretation of standardized testing/informal observation of tasks, assessment of data and development of plan of care and goals.         Evaluating OT: Bunny Carmichael OTR/L #[id number]

## 2022-09-30 NOTE — PROGRESS NOTES
PROGRESS NOTE  By Eliceo Juárez M.D. [facility]  Dr. Jeremias Fortune M.D.,  Dr. Radha Gaona M.D.,   Dr. Joseluis Kauffman D.O.,  Dr. Maryanne Ricardo M.D.,  Dr. Cain Blackmon D.O.,  Mallika Ruth D.O. Doneta Files igyi  81 y.o.  male    Patient not seen and examined in face-to-face encounter secondary to Covid positive status in an effort to decrease transmission/exposure and to preserve existing PPE stockpile. Pertinent notes/labs reviewed -decreased H&H with pending blood transfusion this morning  Discussed with healthcare team/nursing -no evidence of GI bleeding. Physical examination per nursing exam       OBJECTIVE:    BP (!) 118/53   Pulse 74   Temp 99.4 °F (37.4 °C)   Resp 18   Ht 5' 7.5\" (1.715 m)   Wt 164 lb 8 oz (74.6 kg)   SpO2 97%   BMI 25.38 kg/m²         DATA:    Monitor data reviewed -sinus rhythm noted.        Lab Results   Component Value Date/Time    WBC 13.3 09/30/2022 06:37 AM    RBC 2.30 09/30/2022 06:37 AM    HGB 7.0 09/30/2022 06:37 AM    HCT 20.9 09/30/2022 06:37 AM    MCV 90.9 09/30/2022 06:37 AM    MCH 30.4 09/30/2022 06:37 AM    MCHC 33.5 09/30/2022 06:37 AM    RDW 14.6 09/30/2022 06:37 AM     09/30/2022 06:37 AM    MPV 9.9 09/30/2022 06:37 AM     Lab Results   Component Value Date/Time     09/30/2022 06:37 AM    K 3.4 09/30/2022 06:37 AM    K 3.8 09/28/2022 01:08 PM    CL 97 09/30/2022 06:37 AM    CO2 20 09/30/2022 06:37 AM    BUN 49 09/30/2022 06:37 AM    CREATININE 2.5 09/30/2022 06:37 AM    CALCIUM 8.9 09/30/2022 06:37 AM    PROT 6.0 09/30/2022 06:37 AM    LABALBU 2.2 09/30/2022 06:37 AM    BILITOT 0.4 09/30/2022 06:37 AM    ALKPHOS 131 09/30/2022 06:37 AM    AST 25 09/30/2022 06:37 AM    ALT 27 09/30/2022 06:37 AM     Lab Results   Component Value Date/Time    LIPASE 25 08/11/2021 02:54 PM     No results found for: AMYLASE      ASSESSMENT/PLAN:  Patient Active Problem List   Diagnosis    Closed right hip fracture, initial encounter ([facility] [address] Hip fracture (HCC)    Left foot drop    Stroke (cerebrum) (HCC)    Acute renal failure superimposed on stage 4 chronic kidney disease, unspecified acute renal failure type ([facility] [address]    Acute renal failure (ARF) ([facility] [address]     1. Anemia  -Acute on chronic  -Normocytic  -Iron deficient  -No evidence of overt bleed  -Pending FOBT  -IV iron supplementation  -Monitor H&H; defer transfusion to admitting  -Plan for further evaluation with nonemergent upper endoscopy/colonoscopy once acute issues (COVID-19) resolved unless emergent    Iain Hirsch MD  9/30/2022  12:13 PM    NOTE:  This report was transcribed using voice recognition software. Every effort was made to ensure accuracy; however, inadvertent computerized transcription errors may be present.

## 2022-09-30 NOTE — CARE COORDINATION
9/30/22 1523 CM note: COVID (+) 9/28/22. Room air. Hgb was 6.8 on admit, pt has received 2 units PRBC since admit, current hgb is 7.0. Per GI plan for non-emergent EGD & c-scope when acute covid issues are resolved. Spoke with patient via his cellphone [phone]. Pts discharge plan is to return home with [facility] (active with PT only). WILL NEED CHARLEY orders. Pt had hip surgery on 8/13/22 after a fall at home. On 8/17/22 pt was transferred to [facility]. Pt had a stroke after his hip surgery and went to [facility] after his discharge from [facility]. Pt lives alone. DME: JAQUI, BSC, Shower chair. NEED TO COMPLETE ACP. CM will follow.  Electronically signed by Lan Augustine RN on 9/30/2022 at 3:34 PM

## 2022-10-01 LAB
ALBUMIN SERPL-MCNC: 2.5 G/DL (ref 3.5–5.2)
ALP BLD-CCNC: 166 U/L (ref 40–129)
ALT SERPL-CCNC: 32 U/L (ref 0–40)
ANION GAP SERPL CALCULATED.3IONS-SCNC: 12 MMOL/L (ref 7–16)
AST SERPL-CCNC: 27 U/L (ref 0–39)
BASOPHILS ABSOLUTE: 0.02 E9/L (ref 0–0.2)
BASOPHILS RELATIVE PERCENT: 0.1 % (ref 0–2)
BILIRUB SERPL-MCNC: 0.5 MG/DL (ref 0–1.2)
BUN BLDV-MCNC: 39 MG/DL (ref 6–23)
CALCIUM SERPL-MCNC: 8.7 MG/DL (ref 8.6–10.2)
CHLORIDE BLD-SCNC: 97 MMOL/L (ref 98–107)
CO2: 21 MMOL/L (ref 22–29)
CREAT SERPL-MCNC: 2.2 MG/DL (ref 0.7–1.2)
EOSINOPHILS ABSOLUTE: 0.04 E9/L (ref 0.05–0.5)
EOSINOPHILS RELATIVE PERCENT: 0.3 % (ref 0–6)
GFR AFRICAN AMERICAN: 36
GFR NON-AFRICAN AMERICAN: 30 ML/MIN/1.73
GLUCOSE BLD-MCNC: 167 MG/DL (ref 74–99)
HCT VFR BLD CALC: 24.6 % (ref 37–54)
HEMOGLOBIN: 8.2 G/DL (ref 12.5–16.5)
IMMATURE GRANULOCYTES #: 0.11 E9/L
IMMATURE GRANULOCYTES %: 0.7 % (ref 0–5)
LYMPHOCYTES ABSOLUTE: 0.69 E9/L (ref 1.5–4)
LYMPHOCYTES RELATIVE PERCENT: 4.6 % (ref 20–42)
MAGNESIUM: 1.6 MG/DL (ref 1.6–2.6)
MCH RBC QN AUTO: 30.4 PG (ref 26–35)
MCHC RBC AUTO-ENTMCNC: 33.3 % (ref 32–34.5)
MCV RBC AUTO: 91.1 FL (ref 80–99.9)
MONOCYTES ABSOLUTE: 1.2 E9/L (ref 0.1–0.95)
MONOCYTES RELATIVE PERCENT: 8.1 % (ref 2–12)
NEUTROPHILS ABSOLUTE: 12.82 E9/L (ref 1.8–7.3)
NEUTROPHILS RELATIVE PERCENT: 86.2 % (ref 43–80)
PDW BLD-RTO: 15.6 FL (ref 11.5–15)
PHOSPHORUS: 2.6 MG/DL (ref 2.5–4.5)
PLATELET # BLD: 470 E9/L (ref 130–450)
PMV BLD AUTO: 9.6 FL (ref 7–12)
POTASSIUM SERPL-SCNC: 3.4 MMOL/L (ref 3.5–5)
RBC # BLD: 2.7 E12/L (ref 3.8–5.8)
SODIUM BLD-SCNC: 130 MMOL/L (ref 132–146)
TOTAL PROTEIN: 6.2 G/DL (ref 6.4–8.3)
WBC # BLD: 14.9 E9/L (ref 4.5–11.5)

## 2022-10-01 PROCEDURE — 6370000000 HC RX 637 (ALT 250 FOR IP): Performed by: INTERNAL MEDICINE

## 2022-10-01 PROCEDURE — 6370000000 HC RX 637 (ALT 250 FOR IP): Performed by: HOSPITALIST

## 2022-10-01 PROCEDURE — 2060000000 HC ICU INTERMEDIATE R&B

## 2022-10-01 PROCEDURE — 6360000002 HC RX W HCPCS: Performed by: INTERNAL MEDICINE

## 2022-10-01 PROCEDURE — 94640 AIRWAY INHALATION TREATMENT: CPT

## 2022-10-01 PROCEDURE — 83735 ASSAY OF MAGNESIUM: CPT

## 2022-10-01 PROCEDURE — 2580000003 HC RX 258: Performed by: INTERNAL MEDICINE

## 2022-10-01 PROCEDURE — 1200000000 HC SEMI PRIVATE

## 2022-10-01 PROCEDURE — 85025 COMPLETE CBC W/AUTO DIFF WBC: CPT

## 2022-10-01 PROCEDURE — 84100 ASSAY OF PHOSPHORUS: CPT

## 2022-10-01 PROCEDURE — 36415 COLL VENOUS BLD VENIPUNCTURE: CPT

## 2022-10-01 PROCEDURE — 80053 COMPREHEN METABOLIC PANEL: CPT

## 2022-10-01 PROCEDURE — 2580000003 HC RX 258: Performed by: NURSE PRACTITIONER

## 2022-10-01 RX ORDER — CHOLESTYRAMINE 4 G/9G
1 POWDER, FOR SUSPENSION ORAL 2 TIMES DAILY
Status: DISCONTINUED | OUTPATIENT
Start: 2022-10-01 | End: 2022-10-03 | Stop reason: HOSPADM

## 2022-10-01 RX ORDER — POTASSIUM CHLORIDE 20 MEQ/1
40 TABLET, EXTENDED RELEASE ORAL ONCE
Status: COMPLETED | OUTPATIENT
Start: 2022-10-01 | End: 2022-10-01

## 2022-10-01 RX ORDER — NICOTINE 21 MG/24HR
1 PATCH, TRANSDERMAL 24 HOURS TRANSDERMAL DAILY
Status: DISCONTINUED | OUTPATIENT
Start: 2022-10-01 | End: 2022-10-03 | Stop reason: HOSPADM

## 2022-10-01 RX ORDER — SODIUM CHLORIDE AND POTASSIUM CHLORIDE .9; .15 G/100ML; G/100ML
SOLUTION INTRAVENOUS CONTINUOUS
Status: DISCONTINUED | OUTPATIENT
Start: 2022-10-01 | End: 2022-10-02

## 2022-10-01 RX ORDER — MECOBALAMIN 5000 MCG
5 TABLET,DISINTEGRATING ORAL NIGHTLY
Status: DISCONTINUED | OUTPATIENT
Start: 2022-10-01 | End: 2022-10-02

## 2022-10-01 RX ADMIN — Medication 2000 UNITS: at 10:16

## 2022-10-01 RX ADMIN — POTASSIUM CHLORIDE AND SODIUM CHLORIDE: 900; 150 INJECTION, SOLUTION INTRAVENOUS at 18:05

## 2022-10-01 RX ADMIN — ARFORMOTEROL TARTRATE 15 MCG: 15 SOLUTION RESPIRATORY (INHALATION) at 06:18

## 2022-10-01 RX ADMIN — PANTOPRAZOLE SODIUM 40 MG: 40 TABLET, DELAYED RELEASE ORAL at 18:03

## 2022-10-01 RX ADMIN — PANTOPRAZOLE SODIUM 40 MG: 40 TABLET, DELAYED RELEASE ORAL at 05:04

## 2022-10-01 RX ADMIN — ARFORMOTEROL TARTRATE 15 MCG: 15 SOLUTION RESPIRATORY (INHALATION) at 17:29

## 2022-10-01 RX ADMIN — ASPIRIN 81 MG CHEWABLE TABLET 81 MG: 81 TABLET CHEWABLE at 10:16

## 2022-10-01 RX ADMIN — ATORVASTATIN CALCIUM 80 MG: 40 TABLET, FILM COATED ORAL at 21:19

## 2022-10-01 RX ADMIN — Medication 5 MG: at 21:19

## 2022-10-01 RX ADMIN — Medication 10 ML: at 21:15

## 2022-10-01 RX ADMIN — POTASSIUM CHLORIDE 40 MEQ: 1500 TABLET, EXTENDED RELEASE ORAL at 18:03

## 2022-10-01 RX ADMIN — SODIUM CHLORIDE 1000 ML: 9 INJECTION, SOLUTION INTRAVENOUS at 05:08

## 2022-10-01 RX ADMIN — CHOLESTYRAMINE 4 G: 4 POWDER, FOR SUSPENSION ORAL at 18:03

## 2022-10-01 RX ADMIN — BUDESONIDE 500 MCG: 0.5 SUSPENSION RESPIRATORY (INHALATION) at 06:19

## 2022-10-01 RX ADMIN — BUDESONIDE 500 MCG: 0.5 SUSPENSION RESPIRATORY (INHALATION) at 17:29

## 2022-10-01 NOTE — PROGRESS NOTES
Internal Medicine Progress Note    TO=[facility]    [facility]. Ian Coles., TONJAONEHA Mccord D.O., AMY Chavez D.O. Juel Bolk, MSN, APRN, NP-C  Judge Stephon Christianson, MSN, APRN-CNP     Primary Care Physician: Migdalia Wihtfield DO   Admitting Physician:  Devon Finley DO  Admission date and time: 9/28/2022 12:09 PM    Room:  [facility]-  Admitting diagnosis: Hypomagnesemia [E83.42]  Hyponatremia [E87.1]  Symptomatic anemia [D64.9]  Fatigue, unspecified type [R53.83]  Chronic kidney disease, unspecified CKD stage [N18.9]  Acute renal failure superimposed on stage 4 chronic kidney disease, unspecified acute renal failure type ([facility] [address] [N17.9, N18.4]  Acute on chronic anemia [D64.9]    Patient Name: Adelaida Santacruz  MRN: [id number]    Date of Service: 10/1/2022     Subjective: Tamar Drew is a 81 y.o. male who was seen and examined today,10/1/2022, at the bedside. Patient seem to be improving. Hemoglobin appears stable without any signs of active bleeding. Renal function continue to improve    No family present    Review of System:   Constitutional:   Denies fever or chills, weight loss or gain, positive for fatigue/ malaise. HEENT:   Denies ear pain, sore throat, sinus or eye problems. Cardiovascular:   Denies any chest pain, irregular heartbeats, or palpitations. Respiratory:   Denies shortness of breath, coughing, sputum production, hemoptysis, or wheezing. Gastrointestinal:   Denies nausea, vomiting, diarrhea, or constipation. Denies any abdominal pain. Genitourinary:    Denies any urgency, frequency, hematuria. Voiding  without difficulty with Whitley catheter. Extremities:   Denies lower extremity swelling, edema or cyanosis. Neurology:    Denies any headache or focal neurological deficits, admits to generalized weakness  Psch:   Denies being anxious or depressed.   Musculoskeletal:    Denies myalgias, joint complaints or back pain. Integumentary:   Denies any rashes, ulcers, or excoriations. Denies bruising. Hematologic/Lymphatic:  Denies bruising or bleeding. Physical Exam:  I/O this shift:  In: -   Out: 650 [Urine:650]    Intake/Output Summary (Last 24 hours) at 10/1/2022 1642  Last data filed at 10/1/2022 1455  Gross per 24 hour   Intake --   Output 1550 ml   Net -1550 ml   I/O last 3 completed shifts: In: 594.2 [P.O.:240; Blood:354.2]  Out: 4840 [Urine:3100]  Patient Vitals for the past 96 hrs (Last 3 readings):   Weight   09/28/22 2113 164 lb 8 oz (74.6 kg)     Vital Signs:   Blood pressure 136/62, pulse 77, temperature 99 °F (37.2 °C), temperature source Oral, resp. rate 18, height 5' 7.5\" (1.715 m), weight 164 lb 8 oz (74.6 kg), SpO2 96 %. General appearance:  Alert, responsive, oriented to person, place, and time. Well preserved, alert, no distress. Head:  Normocephalic. No masses, lesions or tenderness. Eyes:  PERRLA. EOMI. Sclera clear. ENT:  Ears normal. Mucosa normal.  Missing teeth. Eyeglasses. Impaired vision. Neck:    Supple. Trachea midline. No thyromegaly. No JVD. No bruits. Heart:    Rhythm regular. Rate controlled. No murmurs. Lungs:    Symmetrical. Clear to auscultation bilaterally. No wheezes. No rhonchi. No rales. Abdomen:   Soft. Non-tender. Non-distended. Bowel sounds positive. No organomegaly or masses. No pain on palpation. Extremities:    Peripheral pulses present. No peripheral edema. No ulcers. No cyanosis. No clubbing. Neurologic:    Alert x 3. No focal deficit. Cranial nerves grossly intact. No focal weakness. Psych:   Behavior is normal. Mood appears normal. Speech is not rapid and/or pressured. Musculoskeletal:   Spine ROM normal. Muscular strength intact. Gait not assessed. Integumentary:  No rashes. Pressure ulcer of the left heel with eschar noted. Right heel callus.   Genitalia/Breast:  Whitley catheter    Medication:  Scheduled Meds:   nicotine  1 patch TransDERmal Daily    cholestyramine  1 packet Oral BID    pantoprazole  40 mg Oral BID AC    atorvastatin  80 mg Oral Nightly    Vitamin D  2,000 Units Oral Daily    [Held by provider] clopidogrel  75 mg Oral Daily    [Held by provider] hydroxychloroquine  200 mg Oral Daily    budesonide  0.5 mg Nebulization BID    Arformoterol Tartrate  15 mcg Nebulization BID    sodium chloride flush  5-40 mL IntraVENous 2 times per day    aspirin  81 mg Oral Daily     Continuous Infusions:   0.9% NaCl with KCl 20 mEq      sodium chloride      sodium chloride         Objective Data:  Recent Labs     09/29/22  0744 09/29/22  1413 09/30/22  0637 09/30/22  1549 09/30/22  2230 10/01/22  1015   WBC 14.2*  --  13.3*  --   --  14.9*   RBC 2.46*  --  2.30*  --   --  2.70*   HGB 7.4*   < > 7.0* 7.9* 7.7* 8.2*   HCT 22.2*   < > 20.9* 24.2* 23.3* 24.6*   MCV 90.2  --  90.9  --   --  91.1   MCH 30.1  --  30.4  --   --  30.4   MCHC 33.3  --  33.5  --   --  33.3   RDW 14.6  --  14.6  --   --  15.6*   *  --  452*  --   --  470*   MPV 10.0  --  9.9  --   --  9.6    < > = values in this interval not displayed. Recent Labs     09/29/22  0744 09/30/22  0637 10/01/22  1015   * 129* 130*   K 3.7 3.4* 3.4*   CL 95* 97* 97*   CO2 21* 20* 21*   BUN 57* 49* 39*   CREATININE 3.0* 2.5* 2.2*   GLUCOSE 117* 90 167*   CALCIUM 8.7 8.9 8.7   PROT 6.3* 6.0* 6.2*   LABALBU 2.7* 2.2* 2.5*   BILITOT 0.5 0.4 0.5   ALKPHOS 126 131* 166*   AST 19 25 27   ALT 24 27 32     No results found for: TROPONINI       Wound Documentation:   Patient has pressure ulcer of the left heel, Magallanes stage grade 1 which was present on admission. Patient also has right heel callus.     Assessment:  Acute on chronic renal failure stage IV-V, with multiple electrolyte disturbance and acidosis noted  Acute on chronic anemia without overt blood loss identified  Cerebrovascular disease with relatively recent multifocal CVA, chronic residual left-sided deficits   Pressure ulcer of the left heel, Magallanes stage grade 1 which was present on admission  Right heel callus  Hypomagnesia  Central vascular disease with total occlusion of the left ICA and has been of a carotid stent in the severely stenosed right internal carotid artery August 18, 2022 high-dose aspirin and Plavix therapy (Plavix end date 10/2/2022)  Hyperlipidemia  Hyperglycemia without documented history of diabetes, hemoglobin A1c 5.1%  Nonoxygen dependent COPD with chronic tobacco abuse  Hyperuricemia with history of gout on chronic allopurinol therapy  Recent hospitalization for right basicervical femoral neck fracture status post orthopedic repair 08/2022  Moderate to severe lumbar spinal stenosis  Essential hypertension       Plan:     Renal function seem to be improving  GI is following no further recommendation until COVID symptom resolved  Discussed discharge planning  Continue work with physical therapy  Oxygen has been weaned off    More than 50% of my  time was spent at the bedside counseling/coordinating care with the patient and/or family with face to face contact. This time was spent reviewing notes and laboratory data as well as instructing and counseling the patient. Time I spent with the family or surrogate(s) is included only if the patient was incapable of providing the necessary information or participating in medical decisions. I also discussed the differential diagnosis and all of the proposed management plans with the patient and individuals accompanying the patient.     Linda Burgess DO, D.O., [facility]  4:42 PM  10/1/2022

## 2022-10-01 NOTE — PROGRESS NOTES
PROGRESS NOTE    By Alfonso Menezes D.O. [facility]  Dr. Sharath Li MD, Dr. Cira Rubio MD, Dr Dale Simmons, Dr. Wilson Armendariz MD, Dr. Alfonso Menezes, DO      Jv Walker  81 y.o.  male    [address] [facility] stable without signs of active bleeding. OBJECTIVE:    /62   Pulse 77   Temp 99 °F (37.2 °C) (Oral)   Resp 18   Ht 5' 7.5\" (1.715 m)   Wt 164 lb 8 oz (74.6 kg)   SpO2 96%   BMI 25.38 kg/m²     Gen: NAD, AAO x 3  HEENT:PEERL, no icterus  Heart: RRR, no M/R/G  Lungs: CTAB  Abd.: soft, NT, ND, BS +, no G/R, no HSM  Extr.: no C/C/E, no bruising      Stool (measured) : 0 mL  Lab Results   Component Value Date/Time    WBC 14.9 10/01/2022 10:15 AM    WBC 13.3 09/30/2022 06:37 AM    WBC 14.2 09/29/2022 07:44 AM    HGB 8.2 10/01/2022 10:15 AM    HGB 7.7 09/30/2022 10:30 PM    HGB 7.9 09/30/2022 03:49 PM    HCT 24.6 10/01/2022 10:15 AM    MCV 91.1 10/01/2022 10:15 AM    RDW 15.6 10/01/2022 10:15 AM     10/01/2022 10:15 AM     09/30/2022 06:37 AM     09/29/2022 07:44 AM     Lab Results   Component Value Date/Time     10/01/2022 10:15 AM    K 3.4 10/01/2022 10:15 AM    K 3.8 09/28/2022 01:08 PM    CL 97 10/01/2022 10:15 AM    CO2 21 10/01/2022 10:15 AM    BUN 39 10/01/2022 10:15 AM    CREATININE 2.2 10/01/2022 10:15 AM    CALCIUM 8.7 10/01/2022 10:15 AM    PROT 6.2 10/01/2022 10:15 AM    LABALBU 2.5 10/01/2022 10:15 AM    BILITOT 0.5 10/01/2022 10:15 AM    BILITOT 0.4 09/30/2022 06:37 AM    BILITOT 0.5 09/29/2022 07:44 AM    ALKPHOS 166 10/01/2022 10:15 AM    ALKPHOS 131 09/30/2022 06:37 AM    ALKPHOS 126 09/29/2022 07:44 AM    AST 27 10/01/2022 10:15 AM    AST 25 09/30/2022 06:37 AM    AST 19 09/29/2022 07:44 AM    ALT 32 10/01/2022 10:15 AM    ALT 27 09/30/2022 06:37 AM    ALT 24 09/29/2022 07:44 AM     Lab Results   Component Value Date/Time    LIPASE 25 08/11/2021 02:54 PM     No results found for: AMYLASE      ASSESSMENT/PLAN:  1.  Anemia - Acute on chronic  -Normocytic  -Iron deficient  -No evidence of overt bleed  -FOBT +  -IV iron supplementation  -Monitor H&H; defer transfusion to admitting  -Plan for further evaluation with nonemergent upper endoscopy/colonoscopy once acute issues (COVID-19) resolved unless emergent      Yessica Noel DO  10/1/2022  11:21 AM

## 2022-10-01 NOTE — PROGRESS NOTES
Nephrology Note  Jazmin Gonsalves MD          Patient seen and examined. No family member is present at bedside. Chart reviewed. Patient is feeling better. Has less shortness of breath. Appetite good. No nausea or dysguesia. Awake and alert . In no acute distress. Vital SignsBP 136/62   Pulse 77   Temp 99 °F (37.2 °C) (Oral)   Resp 18   Ht 5' 7.5\" (1.715 m)   Wt 164 lb 8 oz (74.6 kg)   SpO2 96%   BMI 25.38 kg/m²   24HR INTAKE/OUTPUT:    Intake/Output Summary (Last 24 hours) at 10/1/2022 2240  Last data filed at 10/1/2022 2123  Gross per 24 hour   Intake --   Output 1850 ml   Net -1850 ml         Physical Exam    Neck: No JVD  Lungs: Breath sounds decreased at the bases. No rales or ronchi. Heart: Regular rate and rhythm. No S3 gallop. No  murmrur. Abdomen: Soft non distended, non tender and normal bowel sounds. Extremeties: No edema.         Current Facility-Administered Medications   Medication Dose Route Frequency Provider Last Rate Last Admin    nicotine (NICODERM CQ) 14 MG/24HR 1 patch  1 patch TransDERmal Daily Maxi Fay DO        cholestyramine (QUESTRAN) packet 4 g  1 packet Oral BID Maxi Fay DO        0.9% NaCl with KCl 20 mEq infusion   IntraVENous Continuous Angus JANES Sims MD        0.9 % sodium chloride infusion   IntraVENous PRN Maxi Fay DO        ammonium lactate (AMLACTIN) 12 % cream   Topical Q2H PRN Monique Howard DPM        pantoprazole (PROTONIX) tablet 40 mg  40 mg Oral BID AC Lisa Chandler MD   40 mg at 10/01/22 0504    atorvastatin (LIPITOR) tablet 80 mg  80 mg Oral Nightly Gloria [place], DO   80 mg at 09/30/22 1938    Vitamin D (CHOLECALCIFEROL) tablet 2,000 Units  2,000 Units Oral Daily Gloria [place], DO   2,000 Units at 10/01/22 1016    [Held by provider] clopidogrel (PLAVIX) tablet 75 mg  75 mg Oral Daily Gloria [place], DO        [Held by provider] hydroxychloroquine (PLAQUENIL) tablet 200 mg  200 mg Oral Daily Gloria [place], DO        albuterol (PROVENTIL) nebulizer solution 2.5 mg  2.5 mg Nebulization Q4H PRN Nadeem Foy, DO   2.5 mg at 09/30/22 1626    budesonide (PULMICORT) nebulizer suspension 500 mcg  0.5 mg Nebulization BID Nadeem Foy, DO   500 mcg at 10/01/22 1601    Arformoterol Tartrate (BROVANA) nebulizer solution 15 mcg  15 mcg Nebulization BID Nadeem Foy, DO   15 mcg at 10/01/22 0144    sodium chloride flush 0.9 % injection 5-40 mL  5-40 mL IntraVENous 2 times per day Nadeem Foy, DO   10 mL at 09/30/22 1116    sodium chloride flush 0.9 % injection 5-40 mL  5-40 mL IntraVENous PRN Nadeem Foy, DO        0.9 % sodium chloride infusion   IntraVENous PRN Nadeem Foy, DO        ondansetron (ZOFRAN-ODT) disintegrating tablet 4 mg  4 mg Oral Q8H PRN Nadeem Foy, DO        Or    ondansetron [facility]) injection 4 mg  4 mg IntraVENous Q6H PRN Nadeem Foy, DO        acetaminophen (TYLENOL) tablet 650 mg  650 mg Oral Q6H PRN Nadeem Foy, DO        Or    acetaminophen (TYLENOL) suppository 650 mg  650 mg Rectal Q6H PRN Nadeem Foy, DO        aspirin chewable tablet 81 mg  81 mg Oral Daily Nadeem Foy, DO   81 mg at 10/01/22 1016       CT ABDOMEN PELVIS WO CONTRAST Additional Contrast? Oral   Final Result   Kidneys demonstrate stable appearance from prior comparison 08/11/2021 with   left simple appearing renal cortical cystic lesions. No obstructing uropathy   or complex features associated however limited by lack of intravenous   contrast.  No acute findings of the abdomen or pelvis otherwise identified               Labs:    CBC:   Recent Labs     09/29/22  0744 09/29/22  1413 09/30/22  0637 09/30/22  1549 09/30/22  2230 10/01/22  1015   WBC 14.2*  --  13.3*  --   --  14.9*   HGB 7.4*   < > 7.0* 7.9* 7.7* 8.2*   *  --  452*  --   --  470*    < > = values in this interval not displayed.         Lab Results   Component Value Date    IRON 24 (L) 09/28/2022    TIBC 158 (L) 09/28/2022    FERRITIN 1,324 09/28/2022       Lab Results   Component Value Date    PTH 32 06/01/2022    PTH 44 12/08/2021    CALCIUM 8.7 10/01/2022    CALCIUM 8.9 09/30/2022    CALCIUM 8.7 09/29/2022    PHOS 2.6 10/01/2022    PHOS 2.8 09/30/2022    PHOS 3.1 09/29/2022    MG 1.6 10/01/2022    MG 1.5 (L) 09/30/2022    MG 1.3 (L) 09/29/2022       BMP:   Recent Labs     09/29/22  0744 09/30/22  0637 10/01/22  1015   * 129* 130*   K 3.7 3.4* 3.4*   CL 95* 97* 97*   CO2 21* 20* 21*   BUN 57* 49* 39*   CREATININE 3.0* 2.5* 2.2*   GLUCOSE 117* 90 167*       Assessment: / Plan:    1. Acute kidney injury. Acute kidney injury secondary to renal hypoperfusion with decreased effective circulating volume due to profound anemia. Patient with progressive fall in the serum creatinine. We'll follow closely. 2.  Hyponatremia. Patient with hypovolemic hyponatremia. Improved. 3.   Anemia of chronic kidney disease and GI blood loss. Patient has been transfused packed RBC. On IV iron supplement. .  Follow hemoglobin hematocrit.    4.   . Hypokalemia. Supplement potassium. Follow potassium and magnesium levels. 5.  Chronic kidney disease stage G3b  with a baseline serum creatinine 1.6 mg/dL. Willie Rae MD  Nephrology  Electronically signed by Willie Rae MD on 10/1/2022 at 1:01 PM      Note: This report was completed utilizing computer voice recognition software. Every effort has been made to ensure accuracy, however; inadvertent computerized transcription errors may be present.

## 2022-10-02 ENCOUNTER — APPOINTMENT (OUTPATIENT)
Dept: GENERAL RADIOLOGY | Age: 72
DRG: 811 | End: 2022-10-02
Payer: MEDICARE

## 2022-10-02 PROBLEM — U07.1 COVID-19: Status: ACTIVE | Noted: 2022-10-02

## 2022-10-02 LAB
ALBUMIN SERPL-MCNC: 2.2 G/DL (ref 3.5–5.2)
ALP BLD-CCNC: 129 U/L (ref 40–129)
ALT SERPL-CCNC: 27 U/L (ref 0–40)
ANION GAP SERPL CALCULATED.3IONS-SCNC: 12 MMOL/L (ref 7–16)
AST SERPL-CCNC: 21 U/L (ref 0–39)
BASOPHILS ABSOLUTE: 0.02 E9/L (ref 0–0.2)
BASOPHILS RELATIVE PERCENT: 0.1 % (ref 0–2)
BILIRUB SERPL-MCNC: 0.5 MG/DL (ref 0–1.2)
BUN BLDV-MCNC: 29 MG/DL (ref 6–23)
CALCIUM SERPL-MCNC: 8.4 MG/DL (ref 8.6–10.2)
CHLORIDE BLD-SCNC: 102 MMOL/L (ref 98–107)
CO2: 18 MMOL/L (ref 22–29)
CREAT SERPL-MCNC: 1.9 MG/DL (ref 0.7–1.2)
EOSINOPHILS ABSOLUTE: 0.11 E9/L (ref 0.05–0.5)
EOSINOPHILS RELATIVE PERCENT: 0.8 % (ref 0–6)
GFR AFRICAN AMERICAN: 43
GFR NON-AFRICAN AMERICAN: 35 ML/MIN/1.73
GLUCOSE BLD-MCNC: 182 MG/DL (ref 74–99)
HCT VFR BLD CALC: 23 % (ref 37–54)
HEMOGLOBIN: 7.5 G/DL (ref 12.5–16.5)
IMMATURE GRANULOCYTES #: 0.13 E9/L
IMMATURE GRANULOCYTES %: 0.9 % (ref 0–5)
LYMPHOCYTES ABSOLUTE: 0.74 E9/L (ref 1.5–4)
LYMPHOCYTES RELATIVE PERCENT: 5.3 % (ref 20–42)
MAGNESIUM: 1.2 MG/DL (ref 1.6–2.6)
MCH RBC QN AUTO: 29.8 PG (ref 26–35)
MCHC RBC AUTO-ENTMCNC: 32.6 % (ref 32–34.5)
MCV RBC AUTO: 91.3 FL (ref 80–99.9)
MONOCYTES ABSOLUTE: 1.22 E9/L (ref 0.1–0.95)
MONOCYTES RELATIVE PERCENT: 8.8 % (ref 2–12)
NEUTROPHILS ABSOLUTE: 11.62 E9/L (ref 1.8–7.3)
NEUTROPHILS RELATIVE PERCENT: 84.1 % (ref 43–80)
PDW BLD-RTO: 15.2 FL (ref 11.5–15)
PHOSPHORUS: 2.1 MG/DL (ref 2.5–4.5)
PLATELET # BLD: 434 E9/L (ref 130–450)
PMV BLD AUTO: 9.3 FL (ref 7–12)
POTASSIUM SERPL-SCNC: 3.7 MMOL/L (ref 3.5–5)
RBC # BLD: 2.52 E12/L (ref 3.8–5.8)
SODIUM BLD-SCNC: 132 MMOL/L (ref 132–146)
TOTAL PROTEIN: 5.8 G/DL (ref 6.4–8.3)
WBC # BLD: 13.8 E9/L (ref 4.5–11.5)

## 2022-10-02 PROCEDURE — 1200000000 HC SEMI PRIVATE

## 2022-10-02 PROCEDURE — 6370000000 HC RX 637 (ALT 250 FOR IP): Performed by: INTERNAL MEDICINE

## 2022-10-02 PROCEDURE — 6360000002 HC RX W HCPCS: Performed by: INTERNAL MEDICINE

## 2022-10-02 PROCEDURE — 36415 COLL VENOUS BLD VENIPUNCTURE: CPT

## 2022-10-02 PROCEDURE — 6370000000 HC RX 637 (ALT 250 FOR IP): Performed by: HOSPITALIST

## 2022-10-02 PROCEDURE — 83735 ASSAY OF MAGNESIUM: CPT

## 2022-10-02 PROCEDURE — 85025 COMPLETE CBC W/AUTO DIFF WBC: CPT

## 2022-10-02 PROCEDURE — 71045 X-RAY EXAM CHEST 1 VIEW: CPT

## 2022-10-02 PROCEDURE — 94640 AIRWAY INHALATION TREATMENT: CPT

## 2022-10-02 PROCEDURE — 2580000003 HC RX 258: Performed by: INTERNAL MEDICINE

## 2022-10-02 PROCEDURE — 80053 COMPREHEN METABOLIC PANEL: CPT

## 2022-10-02 PROCEDURE — 84100 ASSAY OF PHOSPHORUS: CPT

## 2022-10-02 RX ORDER — SODIUM BICARBONATE 650 MG/1
650 TABLET ORAL 2 TIMES DAILY
Status: DISCONTINUED | OUTPATIENT
Start: 2022-10-02 | End: 2022-10-03 | Stop reason: HOSPADM

## 2022-10-02 RX ORDER — MECOBALAMIN 5000 MCG
10 TABLET,DISINTEGRATING ORAL NIGHTLY
Status: DISCONTINUED | OUTPATIENT
Start: 2022-10-02 | End: 2022-10-03 | Stop reason: HOSPADM

## 2022-10-02 RX ORDER — ALLOPURINOL 100 MG/1
100 TABLET ORAL NIGHTLY
Status: DISCONTINUED | OUTPATIENT
Start: 2022-10-02 | End: 2022-10-03 | Stop reason: HOSPADM

## 2022-10-02 RX ORDER — MAGNESIUM SULFATE 1 G/100ML
1000 INJECTION INTRAVENOUS ONCE
Status: COMPLETED | OUTPATIENT
Start: 2022-10-02 | End: 2022-10-02

## 2022-10-02 RX ADMIN — POTASSIUM CHLORIDE AND SODIUM CHLORIDE: 900; 150 INJECTION, SOLUTION INTRAVENOUS at 08:08

## 2022-10-02 RX ADMIN — Medication 10 MG: at 20:36

## 2022-10-02 RX ADMIN — MAGNESIUM SULFATE HEPTAHYDRATE 1000 MG: 1 INJECTION, SOLUTION INTRAVENOUS at 14:35

## 2022-10-02 RX ADMIN — ALLOPURINOL 100 MG: 100 TABLET ORAL at 20:36

## 2022-10-02 RX ADMIN — Medication 2000 UNITS: at 11:04

## 2022-10-02 RX ADMIN — SODIUM BICARBONATE 650 MG TABLET 650 MG: at 20:36

## 2022-10-02 RX ADMIN — BUDESONIDE 500 MCG: 0.5 SUSPENSION RESPIRATORY (INHALATION) at 18:13

## 2022-10-02 RX ADMIN — CHOLESTYRAMINE 4 G: 4 POWDER, FOR SUSPENSION ORAL at 11:04

## 2022-10-02 RX ADMIN — BUDESONIDE 500 MCG: 0.5 SUSPENSION RESPIRATORY (INHALATION) at 06:26

## 2022-10-02 RX ADMIN — PANTOPRAZOLE SODIUM 40 MG: 40 TABLET, DELAYED RELEASE ORAL at 05:01

## 2022-10-02 RX ADMIN — ARFORMOTEROL TARTRATE 15 MCG: 15 SOLUTION RESPIRATORY (INHALATION) at 18:12

## 2022-10-02 RX ADMIN — ASPIRIN 81 MG CHEWABLE TABLET 81 MG: 81 TABLET CHEWABLE at 11:04

## 2022-10-02 RX ADMIN — PANTOPRAZOLE SODIUM 40 MG: 40 TABLET, DELAYED RELEASE ORAL at 16:05

## 2022-10-02 RX ADMIN — Medication 10 ML: at 20:36

## 2022-10-02 RX ADMIN — ATORVASTATIN CALCIUM 80 MG: 40 TABLET, FILM COATED ORAL at 20:36

## 2022-10-02 RX ADMIN — ARFORMOTEROL TARTRATE 15 MCG: 15 SOLUTION RESPIRATORY (INHALATION) at 06:26

## 2022-10-02 NOTE — PROGRESS NOTES
PROGRESS NOTE    By Aristeo Church D.O. [facility]  Dr. Nir Vance MD, Dr. Phill Song MD, Dr Bro Vásquez, Dr. Kassandra Ramirez MD, DO Teetee Salguero Brochure  81 y.o.  male    SUBJECTIVE: Patient seen and examined. Tolerating diet without signs of active bleeding. Hgb stable.      OBJECTIVE:    BP (!) 116/51   Pulse 84   Temp 98.8 °F (37.1 °C) (Oral)   Resp 18   Ht 5' 7.5\" (1.715 m)   Wt 164 lb 8 oz (74.6 kg)   SpO2 96%   BMI 25.38 kg/m²     Gen: NAD, AAO x 3  HEENT:PEERL, no icterus  Heart: RRR, no M/R/G  Lungs: CTAB  Abd.: soft, NT, ND, BS +, no G/R, no HSM  Extr.: no C/C/E, no bruising      Stool (measured) : 0 mL  Lab Results   Component Value Date/Time    WBC 14.9 10/01/2022 10:15 AM    WBC 13.3 09/30/2022 06:37 AM    WBC 14.2 09/29/2022 07:44 AM    HGB 8.2 10/01/2022 10:15 AM    HGB 7.7 09/30/2022 10:30 PM    HGB 7.9 09/30/2022 03:49 PM    HCT 24.6 10/01/2022 10:15 AM    MCV 91.1 10/01/2022 10:15 AM    RDW 15.6 10/01/2022 10:15 AM     10/01/2022 10:15 AM     09/30/2022 06:37 AM     09/29/2022 07:44 AM     Lab Results   Component Value Date/Time     10/01/2022 10:15 AM    K 3.4 10/01/2022 10:15 AM    K 3.8 09/28/2022 01:08 PM    CL 97 10/01/2022 10:15 AM    CO2 21 10/01/2022 10:15 AM    BUN 39 10/01/2022 10:15 AM    CREATININE 2.2 10/01/2022 10:15 AM    CALCIUM 8.7 10/01/2022 10:15 AM    PROT 6.2 10/01/2022 10:15 AM    LABALBU 2.5 10/01/2022 10:15 AM    BILITOT 0.5 10/01/2022 10:15 AM    BILITOT 0.4 09/30/2022 06:37 AM    BILITOT 0.5 09/29/2022 07:44 AM    ALKPHOS 166 10/01/2022 10:15 AM    ALKPHOS 131 09/30/2022 06:37 AM    ALKPHOS 126 09/29/2022 07:44 AM    AST 27 10/01/2022 10:15 AM    AST 25 09/30/2022 06:37 AM    AST 19 09/29/2022 07:44 AM    ALT 32 10/01/2022 10:15 AM    ALT 27 09/30/2022 06:37 AM    ALT 24 09/29/2022 07:44 AM     Lab Results   Component Value Date/Time    LIPASE 25 08/11/2021 02:54 PM     No results found for: AMYLASE      ASSESSMENT/PLAN:  1.  Anemia - Acute on chronic  -Normocytic  -Iron deficient  -No evidence of overt bleed  -FOBT +  -IV iron supplementation  -Monitor H&H; defer transfusion to admitting  -Plan for further evaluation with nonemergent upper endoscopy/colonoscopy once acute issues (COVID-19) resolved unless emergent       Mitcheal DO Karley  10/2/2022  9:57 AM

## 2022-10-02 NOTE — PROGRESS NOTES
Internal Medicine Progress Note    TO=[facility]    Prince Marcelino. Freeman Reyes., TONJAOLalitaILalita Briones D.O., AMY Cuevas D.O. Gaynell Lobo, MSN, APRN, NP-C  Mimi Akbar. Sondra Shelton, MSN, APRN-CNP     Primary Care Physician: Heriberto Sherman DO   Admitting Physician:  Piter Galvez DO  Admission date and time: 9/28/2022 12:09 PM    Room:  [facility]-  Admitting diagnosis: Hypomagnesemia [E83.42]  Hyponatremia [E87.1]  Symptomatic anemia [D64.9]  Fatigue, unspecified type [R53.83]  Chronic kidney disease, unspecified CKD stage [N18.9]  Acute renal failure superimposed on stage 4 chronic kidney disease, unspecified acute renal failure type ([facility] [address] [N17.9, N18.4]  Acute on chronic anemia [D64.9]    Patient Name: Kayleigh Guerin  MRN: [id number]    Date of Service: 10/2/2022     Subjective: Erasmo Parkinson is a 81 y.o. male who was seen and examined today,10/2/2022, at the bedside. Patient states he is very tired as he did not sleep well at all last night. Very anxious for discharge home. Family present during examination. We discussed that the patient's kidney function does not continue to improve and that we would like to monitor for another 24 hours before making determination if the patient to be discharged home or not. Patient and family member do verbalized understanding. Patient states had approximately 3 bowel movements yesterday but that he has not had any yet today. He was having diarrhea yesterday. family present    Review of System:   Constitutional:   Denies fever or chills, weight loss or gain, positive for fatigue/ malaise-did not sleep well last night. HEENT:   Denies ear pain, sore throat, sinus or eye problems. Cardiovascular:   Denies any chest pain, irregular heartbeats, or palpitations.    Respiratory:   Denies shortness of breath, coughing, sputum production, hemoptysis, or wheezing. Gastrointestinal:   Denies nausea, vomiting, diarrhea, or constipation. Denies any abdominal pain. Genitourinary:    Denies any urgency, frequency, hematuria. Voiding  without difficulty with Whitley catheter. Extremities:   Denies lower extremity swelling, edema or cyanosis. Neurology:    Denies any headache or focal neurological deficits, admits to generalized weakness  Psch:   Denies being anxious or depressed. Musculoskeletal:    Denies  myalgias, joint complaints or back pain. Integumentary:   Denies any rashes, ulcers, or excoriations. Denies bruising. Hematologic/Lymphatic:  Denies bruising or bleeding. Physical Exam:  I/O this shift:  In: 240 [P.O.:240]  Out: 600 [Urine:600]    Intake/Output Summary (Last 24 hours) at 10/2/2022 1534  Last data filed at 10/2/2022 1411  Gross per 24 hour   Intake 240 ml   Output 1700 ml   Net -1460 ml   I/O last 3 completed shifts:  In: -   Out: 2050 [Urine:2050]  Patient Vitals for the past 96 hrs (Last 3 readings):   Weight   09/28/22 2113 164 lb 8 oz (74.6 kg)     Vital Signs:   Blood pressure (!) 116/51, pulse 84, temperature 98.8 °F (37.1 °C), temperature source Oral, resp. rate 18, height 5' 7.5\" (1.715 m), weight 164 lb 8 oz (74.6 kg), SpO2 96 %. General appearance:  Alert, responsive, oriented to person, place, and time. Well preserved, alert, no distress. Head:  Normocephalic. No masses, lesions or tenderness. Eyes:  PERRLA. EOMI. Sclera clear. ENT:  Ears normal. Mucosa normal.  Missing teeth. Eyeglasses. Impaired vision. Neck:    Supple. Trachea midline. No thyromegaly. No JVD. No bruits. Heart:    Rhythm regular. Rate controlled. No murmurs. Lungs:    Symmetrical. Clear to auscultation bilaterally. No wheezes. No rhonchi. No rales. Abdomen:   Soft. Non-tender. Non-distended. Bowel sounds positive. No organomegaly or masses. No pain on palpation. Extremities:    Peripheral pulses present. No peripheral edema. No ulcers.  No cyanosis. No clubbing. Neurologic:    Alert x 3. No focal deficit. Cranial nerves grossly intact. No focal weakness. Psych:   Behavior is normal. Mood appears normal. Speech is not rapid and/or pressured. Musculoskeletal:   Spine ROM normal. Muscular strength intact. Gait not assessed. Integumentary:  No rashes. Pressure ulcer of the left heel with eschar noted. Right heel callus. Genitalia/Breast:  Whitley catheter    Medication:  Scheduled Meds:   melatonin  10 mg Oral Nightly    magnesium sulfate  1,000 mg IntraVENous Once    nicotine  1 patch TransDERmal Daily    cholestyramine  1 packet Oral BID    pantoprazole  40 mg Oral BID AC    atorvastatin  80 mg Oral Nightly    Vitamin D  2,000 Units Oral Daily    clopidogrel  75 mg Oral Daily    hydroxychloroquine  200 mg Oral Daily    budesonide  0.5 mg Nebulization BID    Arformoterol Tartrate  15 mcg Nebulization BID    sodium chloride flush  5-40 mL IntraVENous 2 times per day    aspirin  81 mg Oral Daily     Continuous Infusions:   sodium chloride      sodium chloride         Objective Data:  Recent Labs     09/30/22  0637 09/30/22  1549 09/30/22  2230 10/01/22  1015 10/02/22  1004   WBC 13.3*  --   --  14.9* 13.8*   RBC 2.30*  --   --  2.70* 2.52*   HGB 7.0*   < > 7.7* 8.2* 7.5*   HCT 20.9*   < > 23.3* 24.6* 23.0*   MCV 90.9  --   --  91.1 91.3   MCH 30.4  --   --  30.4 29.8   MCHC 33.5  --   --  33.3 32.6   RDW 14.6  --   --  15.6* 15.2*   *  --   --  470* 434   MPV 9.9  --   --  9.6 9.3    < > = values in this interval not displayed.      Recent Labs     09/30/22  0637 10/01/22  1015 10/02/22  1004   * 130* 132   K 3.4* 3.4* 3.7   CL 97* 97* 102   CO2 20* 21* 18*   BUN 49* 39* 29*   CREATININE 2.5* 2.2* 1.9*   GLUCOSE 90 167* 182*   CALCIUM 8.9 8.7 8.4*   PROT 6.0* 6.2* 5.8*   LABALBU 2.2* 2.5* 2.2*   BILITOT 0.4 0.5 0.5   ALKPHOS 131* 166* 129   AST 25 27 21   ALT 27 32 27     No results found for: TROPONINI       Wound Documentation: Patient has pressure ulcer of the left heel, Magallanes stage grade 1 which was present on admission. Patient also has right heel callus. Assessment:  Acute on chronic renal failure stage IV-V, with multiple electrolyte disturbance and acidosis noted  Acute on chronic anemia without overt blood loss identified  Cerebrovascular disease with relatively recent multifocal CVA, chronic residual left-sided deficits   Pressure ulcer of the left heel, Magallanes stage grade 1 which was present on admission  Right heel callus  Hypomagnesia  Central vascular disease with total occlusion of the left ICA and has been of a carotid stent in the severely stenosed right internal carotid artery August 18, 2022 high-dose aspirin and Plavix therapy (Plavix end date 10/2/2022)  Hyperlipidemia  Hyperglycemia without documented history of diabetes, hemoglobin A1c 5.1%  Nonoxygen dependent COPD with chronic tobacco abuse  Hyperuricemia with history of gout on chronic allopurinol therapy  Recent hospitalization for right basicervical femoral neck fracture status post orthopedic repair 08/2022  Moderate to severe lumbar spinal stenosis  Essential hypertension       Plan:     Renal function seem to be improving-continue to monitor. Nephrology is following patient recommendations have been reviewed. GI is following no further recommendation until COVID symptom resolved  Increase melatonin as patient does have complaint of insomnia. Magnesium supplementation to replete magnesium stores. Discussed discharge planning- for discharge planning. Podiatry is following and recommendations of been reviewed. Continue work with physical therapy  Oxygen has been weaned off    More than 50% of my  time was spent at the bedside counseling/coordinating care with the patient and/or family with face to face contact. This time was spent reviewing notes and laboratory data as well as instructing and counseling the patient.  Time I spent with the family or surrogate(s) is included only if the patient was incapable of providing the necessary information or participating in medical decisions. I also discussed the differential diagnosis and all of the proposed management plans with the patient and individuals accompanying the patient. VLADIMIR Del Valle CNP,   3:34 PM  10/2/2022      I saw and evaluated the patient. I agree with the findings and the plan of care as documented in Charbel Schumacher NP-C's  note.     Tanvir Bruno DO, MURTAZA.OLalita, [facility]  3:39 PM  10/2/2022

## 2022-10-02 NOTE — PROGRESS NOTES
Nephrology Note  Marta Yap MD          Patient seen and examined. Patient's son is present at the bedside. Chart reviewed. Patient is feeling better. He had some diarrhea yesterday but his bowel movements are more formed today. Still has shortness of breath. Appetite good. No nausea or dysguesia. Awake and alert . In no acute distress. Vital SignsBP (!) 116/51   Pulse 84   Temp 98.8 °F (37.1 °C) (Oral)   Resp 18   Ht 5' 7.5\" (1.715 m)   Wt 164 lb 8 oz (74.6 kg)   SpO2 96%   BMI 25.38 kg/m²   24HR INTAKE/OUTPUT:    Intake/Output Summary (Last 24 hours) at 10/2/2022 1139  Last data filed at 10/2/2022 0842  Gross per 24 hour   Intake 240 ml   Output 2000 ml   Net -1760 ml         Physical Exam     Neck: No JVD  Lungs: Breath sounds decreased at the bases. No rales or ronchi. Heart: Regular rate and rhythm. No S3 gallop. No  murmrur. Abdomen: Soft non distended, non tender and normal bowel sounds. Extremeties: No edema.     Current Facility-Administered Medications   Medication Dose Route Frequency Provider Last Rate Last Admin    melatonin disintegrating tablet 10 mg  10 mg Oral Nightly Maxi Fay DO        nicotine (NICODERM CQ) 14 MG/24HR 1 patch  1 patch TransDERmal Daily Maxi Fay DO   1 patch at 10/02/22 1104    cholestyramine (QUESTRAN) packet 4 g  1 packet Oral BID Maxi Fay DO   4 g at 10/02/22 1104    0.9 % sodium chloride infusion   IntraVENous PRN Maxi Fay DO        ammonium lactate (AMLACTIN) 12 % cream   Topical Q2H PRN Markus Litten, DPM        pantoprazole (PROTONIX) tablet 40 mg  40 mg Oral BID AC Alex Delgado MD   40 mg at 10/02/22 0501    atorvastatin (LIPITOR) tablet 80 mg  80 mg Oral Nightly Mary Beverly DO   80 mg at 10/01/22 2119    Vitamin D (CHOLECALCIFEROL) tablet 2,000 Units  2,000 Units Oral Daily Mary Beverly DO   2,000 Units at 10/02/22 1104    clopidogrel (PLAVIX) tablet 75 mg  75 mg Oral Daily Mary Beverly DO hydroxychloroquine (PLAQUENIL) tablet 200 mg  200 mg Oral Daily Piter Rhymes, DO        albuterol (PROVENTIL) nebulizer solution 2.5 mg  2.5 mg Nebulization Q4H PRN Piter Rhymes, DO   2.5 mg at 09/30/22 1626    budesonide (PULMICORT) nebulizer suspension 500 mcg  0.5 mg Nebulization BID Piter Rhymes, DO   500 mcg at 10/02/22 0565    Arformoterol Tartrate (BROVANA) nebulizer solution 15 mcg  15 mcg Nebulization BID Piter Rhymes, DO   15 mcg at 10/02/22 3239    sodium chloride flush 0.9 % injection 5-40 mL  5-40 mL IntraVENous 2 times per day Piter Rhymes, DO   10 mL at 10/01/22 2115    sodium chloride flush 0.9 % injection 5-40 mL  5-40 mL IntraVENous PRN Piter Rhymes, DO        0.9 % sodium chloride infusion   IntraVENous PRN Piter Rhymes, DO        ondansetron (ZOFRAN-ODT) disintegrating tablet 4 mg  4 mg Oral Q8H PRN Piter Rhymes, DO        Or    ondansetron [facility]) injection 4 mg  4 mg IntraVENous Q6H PRN Piter Rhymes, DO        acetaminophen (TYLENOL) tablet 650 mg  650 mg Oral Q6H PRN Piter Rhymes, DO        Or    acetaminophen (TYLENOL) suppository 650 mg  650 mg Rectal Q6H PRN Piter Rhymes, DO        aspirin chewable tablet 81 mg  81 mg Oral Daily Piter Rhymes, DO   81 mg at 10/02/22 1104       CT ABDOMEN PELVIS WO CONTRAST Additional Contrast? Oral   Final Result   Kidneys demonstrate stable appearance from prior comparison 08/11/2021 with   left simple appearing renal cortical cystic lesions. No obstructing uropathy   or complex features associated however limited by lack of intravenous   contrast.  No acute findings of the abdomen or pelvis otherwise identified               Labs:    CBC:   Recent Labs     09/30/22  0637 09/30/22  1549 09/30/22  2230 10/01/22  1015 10/02/22  1004   WBC 13.3*  --   --  14.9* 13.8*   HGB 7.0*   < > 7.7* 8.2* 7.5*   *  --   --  470* 434    < > = values in this interval not displayed.         Lab Results   Component Value Date    IRON 24 (L) 09/28/2022    TIBC 158 (L) 09/28/2022 FERRITIN 1,324 09/28/2022       Lab Results   Component Value Date    PTH 32 06/01/2022    PTH 44 12/08/2021    CALCIUM 8.4 (L) 10/02/2022    CALCIUM 8.7 10/01/2022    CALCIUM 8.9 09/30/2022    PHOS 2.1 (L) 10/02/2022    PHOS 2.6 10/01/2022    PHOS 2.8 09/30/2022    MG 1.2 (L) 10/02/2022    MG 1.6 10/01/2022    MG 1.5 (L) 09/30/2022       BMP:   Recent Labs     09/30/22  0637 10/01/22  1015 10/02/22  1004   * 130* 132   K 3.4* 3.4* 3.7   CL 97* 97* 102   CO2 20* 21* 18*   BUN 49* 39* 29*   CREATININE 2.5* 2.2* 1.9*   GLUCOSE 90 167* 182*             Assessment: / Plan:       1. Acute kidney injury. Acute kidney injury secondary to renal hypoperfusion with decreased effective circulating volume due to profound anemia. Patient with progressive fall in the serum creatinine. We'll follow closely. Hep-Lock IV fluids. 2.  Hyponatremia. Patient with hypovolemic hyponatremia. Improved. 3.   Anemia of chronic kidney disease and GI blood loss. Patient has been transfused packed RBC. On IV iron supplement. .  Follow hemoglobin hematocrit.     4.   . Hypokalemia. improved with supplement. Patient does have hypomagnesemia. Follow potassium and magnesium levels. 5.  Hypomagnesemia. Hypomagnesemia  off unknown etiology. Supplement. 6.  Hypocalcemia. We'll check on his calcium vitamin D and PTH levels. 7.  Chronic kidney disease stage G3b  with a baseline serum creatinine 1.6 mg/dL. Lisa Simon MD  Nephrology  Electronically signed by Lisa Simon MD on 10/2/2022 at 11:33 AM      Note: This report was completed utilizing computer voice recognition software. Every effort has been made to ensure accuracy, however; inadvertent computerized transcription errors may be present.

## 2022-10-02 NOTE — PROGRESS NOTES
Physician Progress Note      PATIENT:               Chino Bro  [facility] #:                  [id number]  :                       1950  ADMIT DATE:       2022 12:09 PM  [address] [place] DATE:  Arjulee Velasquezo  PROVIDER #:        Merlinda Gust Black DO          QUERY TEXT:    Pt admitted with fatigue. Pt noted to have anemia, CAYETANO, COVID-19. If possible,   please document in progress notes and discharge summary the relationship, if   any, between fatigue and . Anemia, CAYETANO, COVID-19    The medical record reflects the following:  Risk Factors: Anemia, CKD, COVID-19  Clinical Indicators:  nephrology consult note \"Acute kidney injury,   possibly secondary renal hypoperfusion in the setting of decreased effective   circulating volume due to profound anemia. \", positive COVID-19, H&P reflects   \"anemia, he denies overt bleeding and there is likely a component of anemia of   chronic disease\"  Treatment: Nephrology and GI consult consult, PRBC transfusion, CBC, IV ferric   gluconate. Thank you,  JUDY MartellN, RN, [facility]  [phone]  Options provided:  -- Fatigue due to anemia of chronic disease  -- Fatigue due to COVID-19  -- Fatigue due to CAYETANO  -- Fatigue due to CAYETANO, anemia, and COVID-19I  -- Fatigue due to CAYETANO and anemia  -- Other - I will add my own diagnosis  -- Disagree - Not applicable / Not valid  -- Disagree - Clinically unable to determine / Unknown  -- Refer to Clinical Documentation Reviewer    PROVIDER RESPONSE TEXT:    This patient has fatigue due to anemia.     Query created by: Ellis Tariq on 2022 10:46 AM      Electronically signed by:  Mani Avina DO 10/2/2022 6:50 PM

## 2022-10-03 VITALS
RESPIRATION RATE: 18 BRPM | HEIGHT: 68 IN | SYSTOLIC BLOOD PRESSURE: 121 MMHG | BODY MASS INDEX: 24.93 KG/M2 | WEIGHT: 164.5 LBS | TEMPERATURE: 99.6 F | HEART RATE: 95 BPM | DIASTOLIC BLOOD PRESSURE: 62 MMHG | OXYGEN SATURATION: 98 %

## 2022-10-03 LAB
ALBUMIN SERPL-MCNC: 2.3 G/DL (ref 3.5–5.2)
ALP BLD-CCNC: 128 U/L (ref 40–129)
ALT SERPL-CCNC: 29 U/L (ref 0–40)
ANION GAP SERPL CALCULATED.3IONS-SCNC: 10 MMOL/L (ref 7–16)
AST SERPL-CCNC: 26 U/L (ref 0–39)
BASOPHILS ABSOLUTE: 0.03 E9/L (ref 0–0.2)
BASOPHILS RELATIVE PERCENT: 0.2 % (ref 0–2)
BILIRUB SERPL-MCNC: 0.5 MG/DL (ref 0–1.2)
BLOOD CULTURE, ROUTINE: NORMAL
BUN BLDV-MCNC: 25 MG/DL (ref 6–23)
CALCIUM SERPL-MCNC: 8.7 MG/DL (ref 8.6–10.2)
CHLORIDE BLD-SCNC: 101 MMOL/L (ref 98–107)
CO2: 20 MMOL/L (ref 22–29)
CREAT SERPL-MCNC: 1.9 MG/DL (ref 0.7–1.2)
CULTURE, BLOOD 2: NORMAL
EOSINOPHILS ABSOLUTE: 0.15 E9/L (ref 0.05–0.5)
EOSINOPHILS RELATIVE PERCENT: 1.2 % (ref 0–6)
GFR AFRICAN AMERICAN: 43
GFR NON-AFRICAN AMERICAN: 35 ML/MIN/1.73
GLUCOSE BLD-MCNC: 109 MG/DL (ref 74–99)
HCT VFR BLD CALC: 24.5 % (ref 37–54)
HEMOGLOBIN: 8 G/DL (ref 12.5–16.5)
IMMATURE GRANULOCYTES #: 0.08 E9/L
IMMATURE GRANULOCYTES %: 0.6 % (ref 0–5)
LYMPHOCYTES ABSOLUTE: 0.84 E9/L (ref 1.5–4)
LYMPHOCYTES RELATIVE PERCENT: 6.7 % (ref 20–42)
MAGNESIUM: 1.4 MG/DL (ref 1.6–2.6)
MCH RBC QN AUTO: 30 PG (ref 26–35)
MCHC RBC AUTO-ENTMCNC: 32.7 % (ref 32–34.5)
MCV RBC AUTO: 91.8 FL (ref 80–99.9)
MONOCYTES ABSOLUTE: 1.03 E9/L (ref 0.1–0.95)
MONOCYTES RELATIVE PERCENT: 8.2 % (ref 2–12)
NEUTROPHILS ABSOLUTE: 10.38 E9/L (ref 1.8–7.3)
NEUTROPHILS RELATIVE PERCENT: 83.1 % (ref 43–80)
PDW BLD-RTO: 15.2 FL (ref 11.5–15)
PHOSPHORUS: 3 MG/DL (ref 2.5–4.5)
PLATELET # BLD: 450 E9/L (ref 130–450)
PMV BLD AUTO: 9.5 FL (ref 7–12)
POTASSIUM SERPL-SCNC: 3.6 MMOL/L (ref 3.5–5)
RBC # BLD: 2.67 E12/L (ref 3.8–5.8)
SODIUM BLD-SCNC: 131 MMOL/L (ref 132–146)
TOTAL PROTEIN: 5.9 G/DL (ref 6.4–8.3)
WBC # BLD: 12.5 E9/L (ref 4.5–11.5)

## 2022-10-03 PROCEDURE — 94640 AIRWAY INHALATION TREATMENT: CPT

## 2022-10-03 PROCEDURE — 80053 COMPREHEN METABOLIC PANEL: CPT

## 2022-10-03 PROCEDURE — 97530 THERAPEUTIC ACTIVITIES: CPT

## 2022-10-03 PROCEDURE — 6360000002 HC RX W HCPCS: Performed by: NURSE PRACTITIONER

## 2022-10-03 PROCEDURE — 36415 COLL VENOUS BLD VENIPUNCTURE: CPT

## 2022-10-03 PROCEDURE — 2580000003 HC RX 258: Performed by: INTERNAL MEDICINE

## 2022-10-03 PROCEDURE — 6370000000 HC RX 637 (ALT 250 FOR IP): Performed by: INTERNAL MEDICINE

## 2022-10-03 PROCEDURE — 6370000000 HC RX 637 (ALT 250 FOR IP): Performed by: NURSE PRACTITIONER

## 2022-10-03 PROCEDURE — 85025 COMPLETE CBC W/AUTO DIFF WBC: CPT

## 2022-10-03 PROCEDURE — 84100 ASSAY OF PHOSPHORUS: CPT

## 2022-10-03 PROCEDURE — 97110 THERAPEUTIC EXERCISES: CPT

## 2022-10-03 PROCEDURE — 6370000000 HC RX 637 (ALT 250 FOR IP): Performed by: HOSPITALIST

## 2022-10-03 PROCEDURE — 6360000002 HC RX W HCPCS: Performed by: INTERNAL MEDICINE

## 2022-10-03 PROCEDURE — 83735 ASSAY OF MAGNESIUM: CPT

## 2022-10-03 RX ORDER — PANTOPRAZOLE SODIUM 40 MG/1
40 TABLET, DELAYED RELEASE ORAL
Qty: 60 TABLET | Refills: 0 | Status: SHIPPED | OUTPATIENT
Start: 2022-10-03 | End: 2022-11-02

## 2022-10-03 RX ORDER — SODIUM BICARBONATE 650 MG/1
650 TABLET ORAL 2 TIMES DAILY
Qty: 60 TABLET | Refills: 0 | Status: SHIPPED | OUTPATIENT
Start: 2022-10-03 | End: 2022-11-02

## 2022-10-03 RX ORDER — ASPIRIN 81 MG/1
81 TABLET, CHEWABLE ORAL DAILY
Qty: 30 TABLET | Refills: 0 | Status: SHIPPED | OUTPATIENT
Start: 2022-10-04

## 2022-10-03 RX ORDER — CHOLESTYRAMINE 4 G/9G
1 POWDER, FOR SUSPENSION ORAL 2 TIMES DAILY
Qty: 60 PACKET | Refills: 0 | Status: SHIPPED | OUTPATIENT
Start: 2022-10-03 | End: 2022-11-02

## 2022-10-03 RX ORDER — MAGNESIUM SULFATE IN WATER 40 MG/ML
2000 INJECTION, SOLUTION INTRAVENOUS ONCE
Status: COMPLETED | OUTPATIENT
Start: 2022-10-03 | End: 2022-10-03

## 2022-10-03 RX ORDER — POTASSIUM CHLORIDE 20 MEQ/1
20 TABLET, EXTENDED RELEASE ORAL 2 TIMES DAILY WITH MEALS
Status: DISCONTINUED | OUTPATIENT
Start: 2022-10-03 | End: 2022-10-03 | Stop reason: HOSPADM

## 2022-10-03 RX ORDER — MECOBALAMIN 5000 MCG
10 TABLET,DISINTEGRATING ORAL NIGHTLY
Qty: 60 TABLET | Refills: 0 | Status: SHIPPED | OUTPATIENT
Start: 2022-10-03 | End: 2022-11-02

## 2022-10-03 RX ORDER — MAGNESIUM SULFATE 1 G/100ML
1000 INJECTION INTRAVENOUS ONCE
Status: COMPLETED | OUTPATIENT
Start: 2022-10-03 | End: 2022-10-03

## 2022-10-03 RX ORDER — NICOTINE 21 MG/24HR
1 PATCH, TRANSDERMAL 24 HOURS TRANSDERMAL DAILY
Qty: 30 PATCH | Refills: 0 | Status: SHIPPED | OUTPATIENT
Start: 2022-10-04

## 2022-10-03 RX ADMIN — ARFORMOTEROL TARTRATE 15 MCG: 15 SOLUTION RESPIRATORY (INHALATION) at 06:42

## 2022-10-03 RX ADMIN — MAGNESIUM SULFATE HEPTAHYDRATE 1000 MG: 1 INJECTION, SOLUTION INTRAVENOUS at 11:39

## 2022-10-03 RX ADMIN — HYDROXYCHLOROQUINE SULFATE 200 MG: 200 TABLET ORAL at 08:44

## 2022-10-03 RX ADMIN — ASPIRIN 81 MG CHEWABLE TABLET 81 MG: 81 TABLET CHEWABLE at 08:44

## 2022-10-03 RX ADMIN — BUDESONIDE 500 MCG: 0.5 SUSPENSION RESPIRATORY (INHALATION) at 06:42

## 2022-10-03 RX ADMIN — SODIUM BICARBONATE 650 MG TABLET 650 MG: at 08:44

## 2022-10-03 RX ADMIN — Medication 2000 UNITS: at 08:44

## 2022-10-03 RX ADMIN — POTASSIUM CHLORIDE 20 MEQ: 1500 TABLET, EXTENDED RELEASE ORAL at 11:37

## 2022-10-03 RX ADMIN — CLOPIDOGREL BISULFATE 75 MG: 75 TABLET ORAL at 08:44

## 2022-10-03 RX ADMIN — Medication 10 ML: at 08:45

## 2022-10-03 RX ADMIN — PANTOPRAZOLE SODIUM 40 MG: 40 TABLET, DELAYED RELEASE ORAL at 06:07

## 2022-10-03 RX ADMIN — CHOLESTYRAMINE 4 G: 4 POWDER, FOR SUSPENSION ORAL at 08:43

## 2022-10-03 RX ADMIN — MAGNESIUM SULFATE HEPTAHYDRATE 2000 MG: 40 INJECTION, SOLUTION INTRAVENOUS at 12:53

## 2022-10-03 NOTE — DISCHARGE SUMMARY
Internal Medicine Progress Note     TO=[facility]     Antony Leigh. Kylah Shah, F.A.C.O.I. Pedro Jade D.O., TONJAONEHA Lange D.O. Pedro Avalos, MSN, APRN, NP-C  Tramaine Echevarria.  Carmita El, MSN, APRN-CNP       Internal Medicine  Discharge Summary    NAME: Len Huang  :  1950  MRN:  [id number]  PCP:Srini Garnica DO  ADMITTED: 2022      DISCHARGED: 10/3/22    ADMITTING PHYSICIAN: Nia Vargas DO    CONSULTANT(S):   IP CONSULT TO SOCIAL WORK  IP CONSULT TO NEPHROLOGY  IP CONSULT TO GI  IP CONSULT TO PODIATRY     ADMITTING DIAGNOSIS:   Hypomagnesemia [E83.42]  Hyponatremia [E87.1]  Symptomatic anemia [D64.9]  Fatigue, unspecified type [R53.83]  Chronic kidney disease, unspecified CKD stage [N18.9]  Acute renal failure superimposed on stage 4 chronic kidney disease, unspecified acute renal failure type ([facility] [address] [N17.9, N18.4]  Acute on chronic anemia [D64.9]     DISCHARGE DIAGNOSES:   Acute on chronic renal failure stage IV-V, with multiple electrolyte disturbance and acidosis noted  Acute on chronic anemia without overt blood loss identified  Cerebrovascular disease with relatively recent multifocal CVA, chronic residual left-sided deficits   Pressure ulcer of the left heel, Magallanes stage grade 1 which was present on admission  Right heel callus  Hypomagnesia  Central vascular disease with total occlusion of the left ICA and has been of a carotid stent in the severely stenosed right internal carotid artery 2022 high-dose aspirin and Plavix therapy (Plavix end date 10/2/2022)  Hyperlipidemia  Hyperglycemia without documented history of diabetes, hemoglobin A1c 5.1%  Nonoxygen dependent COPD with chronic tobacco abuse  Hyperuricemia with history of gout on chronic allopurinol therapy  Recent hospitalization for right basicervical femoral neck fracture status post orthopedic repair 2022  Moderate to severe lumbar spinal stenosis  Essential hypertension    BRIEF HISTORY OF PRESENT ILLNESS:   Adan Geller is 79-year-old male patient presented to [address] [place] [address] for evaluation at the request of his nephrologist.  He was evaluated by the nephrologist and had labs drawn yesterday. Multiple laboratory abnormalities were noted, including worsening renal failure and anemia. He was evaluated in the ER where repeat labs, type and screen were ordered. These results are pending. Case was discussed with ER physician with plan for admission, further care and management under the service of Dr. Christian Torres. Patient is seen and examined at bedside with family present. We have reviewed the above given history. He is somewhat of a poor historian and family assists to a certain degree. He states that he was feeling more fatigued but essentially otherwise at his baseline. Abnormal labs were called in with recommendation to go to ER for admission. He denies any other acute symptoms. No fevers or chills, known sick contact or exposures. No headache or acute neurological symptoms superimposed on his chronic left-sided weakness and relatively new foot drop in the setting of recent stroke. He underwent carotid procedure in [place] following his last hospitalization here where he was treated for hip fracture. Denies any chest pain, palpitations or fluttering. Denies any shortness of breath, cough or phlegm production. Exertional dyspnea is present to a certain degree but is not worse than usual.  No abdominal pain, nausea, vomiting, bowel changes, hematemesis, hematochezia or melena. He denies any changes in urinary frequency, volume or any hematuria, suprapubic or flank discomfort.      LABS[de-identified]  Lab Results   Component Value Date    WBC 12.5 (H) 10/03/2022    HGB 8.0 (L) 10/03/2022    HCT 24.5 (L) 10/03/2022     10/03/2022     (L) 10/03/2022    K 3.6 10/03/2022     10/03/2022    CREATININE 1.9 (H) 10/03/2022    BUN 25 (H) 10/03/2022    CO2 20 (L) 10/03/2022    GLUCOSE 109 (H) 10/03/2022    ALT 29 10/03/2022    AST 26 10/03/2022    INR 1.0 08/11/2022     Lab Results   Component Value Date    INR 1.0 08/11/2022    PROTIME 10.9 08/11/2022      Lab Results   Component Value Date    TSH 0.811 09/29/2022     Lab Results   Component Value Date    TRIG 73 09/29/2022    TRIG 128 12/18/2015    TRIG 337 (H) 10/20/2014     Lab Results   Component Value Date    HDL 22 09/29/2022    HDL 52 12/18/2015    HDL 42 10/20/2014     Lab Results   Component Value Date    LDLCALC 24 09/29/2022    LDLCALC 114 (H) 12/18/2015    LDLCALC 96 10/20/2014     Lab Results   Component Value Date    LABA1C 5.5 09/29/2022       IMAGING:  CT ABDOMEN PELVIS WO CONTRAST Additional Contrast? Oral    Result Date: 9/29/2022  EXAMINATION: CT OF THE ABDOMEN AND PELVIS WITHOUT CONTRAST 9/29/2022 6:59 pm TECHNIQUE: CT of the abdomen and pelvis was performed without the administration of intravenous contrast. Multiplanar reformatted images are provided for review. Automated exposure control, iterative reconstruction, and/or weight based adjustment of the mA/kV was utilized to reduce the radiation dose to as low as reasonably achievable. COMPARISON: CT dated 08/11/2021 HISTORY: ORDERING SYSTEM PROVIDED HISTORY: evaluate kidneys TECHNOLOGIST PROVIDED HISTORY: Reason for exam:->evaluate kidneys Additional Contrast?->Oral FINDINGS: Lower Chest: Lung bases are clear. Organs: Liver without focal lesion. Gallbladder unremarkable. Pancreas and spleen unremarkable. Adrenals without nodule. Kidneys without suspicious renal lesion and no hydronephrosis. Stable appearance of left renal cortical cysts from prior comparison without abnormal complex features on limited noncontrast evaluation. GI/Bowel: No focal thickening or disproportion dilatation of bowel. No inflammatory findings. Intraluminal contrast extends through the colonic segments and rectum.  Pelvis: No suspicious pelvic lesion or bulky pelvic adenopathy/free fluid. Peritoneum/Retroperitoneum: No bulky retroperitoneal adenopathy. No suspicious peritoneal or mesenteric process Vasculature: Grossly normal caliber of abdominal aorta and vasculature Bones/Soft Tissues: No acute osseous or soft tissue findings. Right hip arthroplasty in place with associated beam hardening artifact. Kidneys demonstrate stable appearance from prior comparison 08/11/2021 with left simple appearing renal cortical cystic lesions. No obstructing uropathy or complex features associated however limited by lack of intravenous contrast.  No acute findings of the abdomen or pelvis otherwise identified     XR HIP RIGHT (2-3 VIEWS)    Result Date: 9/12/2022  EXAMINATION: TWO XRAY VIEWS OF THE RIGHT HIP 9/12/2022 12:06 pm COMPARISON: 13 August 2022 HISTORY: ORDERING SYSTEM PROVIDED HISTORY: History of right hip hemiarthroplasty FINDINGS: Anatomically aligned right hip arthroplasty with no new abnormal bone or soft tissue findings. Osteoarthritis at the contralateral left hip is stable. Right hip arthroplasty with no unexpected findings. Unchanged left hip osteoarthritis. XR CHEST PORTABLE    Result Date: 10/2/2022  EXAMINATION: ONE XRAY VIEW OF THE CHEST 10/2/2022 10:28 pm COMPARISON: 8-22 HISTORY: ORDERING SYSTEM PROVIDED HISTORY: fever TECHNOLOGIST PROVIDED HISTORY: Reason for exam:->fever FINDINGS: Cardiomediastinal silhouette: Stable appearance of the cardiomediastinal silhouette allowing for technical differences. Lungs/pleura: No acute pulmonary infiltrate, pleural effusion, or pneumothorax is identified. Mildly prominent lung markings appearing exaggerated by technique. Retrocardiac region not well evaluated. Other: No additional acute abnormality. No definite infiltrates where adequately visualized. RECOMMENDATION: PA and lateral exam may better evaluate  as clinically warranted.          HOSPITAL COURSE:   Extended period of time hospitalized and this will serve as a brief synopsis. He was admitted the afternoon of September 28 secondary to multiple laboratory abnormalities with acute on chronic renal failure and anemia. He had a recent hospitalization in [facility] for an unrelated surgical issue which included carotid stent placement. They anemia was addressed with transfusion, anemia evaluation was undertaken and counts remained stable. Antiplatelet therapy was resumed without sequela and will be continued upon discharge. We [facility] for further recommendations and adjustments. Nephrology provided consultation in setting of acute on chronic renal failure with multiple electrolyte disturbance and metabolic acidosis. He was started on electrolyte segmentation with IV and oral as well as sodium bicarbonate by mouth with good response. Creatinine trended all way down to 1.9 which is essentially baseline. In the setting of pressure ulcer to the left heel which was present on admission, the wound care team followed, offloading and local wound care are provided and home health care will be provided upon discharge for therapy as well as wound care and nursing. Infectious work-up did return positive for COVID-19 however this is an incidental, asymptomatic and did not require supplemental oxygen and thusly he did not meet criteria for protocol driven medication. His chronic comorbidities, lab values and vital signs were monitored and addressed accordingly throughout the hospitalization. He understands importance of close follow-up, medication and treatment adherence. He is medically stable for discharge home as discussed     BRIEF PHYSICAL EXAMINATION AND LABORATORIES ON DAY OF DISCHARGE:  VITALS:  /62   Pulse 95   Temp 99.6 °F (37.6 °C) (Oral)   Resp 18   Ht 5' 7.5\" (1.715 m)   Wt 164 lb 8 oz (74.6 kg)   SpO2 98%   BMI 25.38 kg/m²     HEENT:  PERRLA. EOMI. Sclera clear. Buccal mucosa moist.    Neck:  Supple. Trachea midline. No thyromegaly. No JVD. No bruits. Heart:  Rhythm regular, rate controlled. S1 and S2. Systolic murmur. Lungs:  Symmetrical. Clear to auscultation bilaterally. No wheezes. No rhonchi. No rales. Abdomen: Soft. Normal.  Non-tender. Non-distended. Bowel sounds positive. No organomegaly or masses. No pain on palpation    Extremities:  Peripheral pulses present. No significant pitting peripheral edema. No ulcers. Neurologic:  Alert x 3. Generally weak without focal deficit. Cranial nerves grossly intact. Skin: Wounds as discussed. Dressings not removed for direct visualization no petechia. No hemorrhage. No additional wounds. DISPOSITION:  The patient's condition is stable. At this time the patient is without objective evidence of an acute process requiring continuing hospitalization or inpatient management. They are stable for discharge with outpatient follow-up. I have spoken with the patient and discussed the results of the current hospitalization, in addition to providing specific details for the plan of care and counseling regarding the diagnosis and prognosis. The plan has been discussed in detail and they are aware of the specific conditions for emergent return, as well as the importance of follow-up.   Their questions are answered at this time and they are agreeable with the plan for discharge to home    DISCHARGE MEDICATIONS:   Current Discharge Medication List             Details   aspirin 81 MG chewable tablet Take 1 tablet by mouth daily  Qty: 30 tablet, Refills: 0      cholestyramine (QUESTRAN) 4 g packet Take 1 packet by mouth 2 times daily  Qty: 60 packet, Refills: 0      melatonin 5 MG TBDP disintegrating tablet Take 2 tablets by mouth nightly  Qty: 60 tablet, Refills: 0      nicotine (NICODERM CQ) 14 MG/24HR Place 1 patch onto the skin daily  Qty: 30 patch, Refills: 0                Details   sodium bicarbonate 650 MG tablet Take 1 tablet by mouth 2 times daily  Qty: 60 tablet, Refills: 0      pantoprazole (PROTONIX) 40 MG tablet Take 1 tablet by mouth 2 times daily (before meals)  Qty: 60 tablet, Refills: 0                Details   clopidogrel (PLAVIX) 75 MG tablet Take 75 mg by mouth every morning      atorvastatin (LIPITOR) 80 MG tablet Take 80 mg by mouth Daily with supper      allopurinol (ZYLOPRIM) 100 MG tablet Take 100 mg by mouth every morning      ammonium lactate (LAC-HYDRIN) 12 % lotion Apply 1 g topically in the morning and at bedtime Apply topically to both heels      oxyCODONE-acetaminophen (PERCOCET) 5-325 MG per tablet Take 1 tablet by mouth every 6 hours as needed for Pain.      hydroxychloroquine (PLAQUENIL) 200 MG tablet Take 200 mg by mouth every morning      Vitamin D (CHOLECALCIFEROL) 25 MCG (1000 UT) TABS tablet Take 1,000 Units by mouth every morning             FOLLOW UP/INSTRUCTIONS:  This patient is instructed to follow-up with his primary care physician. Patient is instructed to follow-up with the consults listed above as directed by them. he is instructed to resume home medications and take new medications as indicated in the list above. If the patient has a recurrence of symptoms, he is instructed to go to the ED. Preparing for this patient's discharge, including paperwork, orders, instructions, and meeting with patient did require > 40 minutes.     VLADIMIR Bro CNP     10/3/2022  11:09 AM

## 2022-10-03 NOTE — PROGRESS NOTES
NEPHROLOGY Attending   Progress Note  10/3/2022 10:15 AM  Subjective:   Admit Date: 9/28/2022  PCP: Trenton Padilla DO    Interval History:     10/3/2022: Patient remains in [facility] protocol - all aspects of care reviewed with the primary RN - upon my exam patient sitting up in bed reading paper in no acute distress - anticipating discharge soon      Diet: ADULT DIET;  Regular  ADULT ORAL NUTRITION SUPPLEMENT; Breakfast; Other Oral Supplement; Gelatein 20  ADULT ORAL NUTRITION SUPPLEMENT; Lunch, Dinner; Frozen Oral Supplement    Data:   Scheduled Meds:   melatonin  10 mg Oral Nightly    allopurinol  100 mg Oral Nightly    sodium bicarbonate  650 mg Oral BID    nicotine  1 patch TransDERmal Daily    cholestyramine  1 packet Oral BID    pantoprazole  40 mg Oral BID AC    atorvastatin  80 mg Oral Nightly    Vitamin D  2,000 Units Oral Daily    clopidogrel  75 mg Oral Daily    hydroxychloroquine  200 mg Oral Daily    budesonide  0.5 mg Nebulization BID    Arformoterol Tartrate  15 mcg Nebulization BID    sodium chloride flush  5-40 mL IntraVENous 2 times per day    aspirin  81 mg Oral Daily     Continuous Infusions:   sodium chloride      sodium chloride       PRN Meds:sodium chloride, ammonium lactate, albuterol, sodium chloride flush, sodium chloride, ondansetron **OR** ondansetron, acetaminophen **OR** acetaminophen    Intake/Output Summary (Last 24 hours) at 10/3/2022 1015  Last data filed at 10/2/2022 2009  Gross per 24 hour   Intake 0 ml   Output 350 ml   Net -350 ml     CBC:   Recent Labs     10/01/22  1015 10/02/22  1004 10/03/22  0604   WBC 14.9* 13.8* 12.5*   HGB 8.2* 7.5* 8.0*   * 434 450     BMP:    Recent Labs     10/01/22  1015 10/02/22  1004 10/03/22  0604   * 132 131*   K 3.4* 3.7 3.6   CL 97* 102 101   CO2 21* 18* 20*   BUN 39* 29* 25*   CREATININE 2.2* 1.9* 1.9*   GLUCOSE 167* 182* 109*     Hepatic:   Recent Labs     10/01/22  1015 10/02/22  1004 10/03/22  0604   AST 27 21 26   ALT 32 27 29   BILITOT 0.5 0.5 0.5   ALKPHOS 166* 129 128     Troponin: No results for input(s): TROPONINI in the last 72 hours. BNP: No results for input(s): BNP in the last 72 hours. Lipids:   No results for input(s): CHOL, HDL in the last 72 hours. Invalid input(s): LDLCALCU    ABGs: No results found for: PHART, PO2ART, [id number]  INR: No results for input(s): INR in the last 72 hours. -----------------------------------------------------------------  RAD: CT ABDOMEN PELVIS WO CONTRAST Additional Contrast? Oral    Result Date: 9/29/2022  EXAMINATION: CT OF THE ABDOMEN AND PELVIS WITHOUT CONTRAST 9/29/2022 6:59 pm TECHNIQUE: CT of the abdomen and pelvis was performed without the administration of intravenous contrast. Multiplanar reformatted images are provided for review. Automated exposure control, iterative reconstruction, and/or weight based adjustment of the mA/kV was utilized to reduce the radiation dose to as low as reasonably achievable. COMPARISON: CT dated 08/11/2021 HISTORY: ORDERING SYSTEM PROVIDED HISTORY: evaluate kidneys TECHNOLOGIST PROVIDED HISTORY: Reason for exam:->evaluate kidneys Additional Contrast?->Oral FINDINGS: Lower Chest: Lung bases are clear. Organs: Liver without focal lesion. Gallbladder unremarkable. Pancreas and spleen unremarkable. Adrenals without nodule. Kidneys without suspicious renal lesion and no hydronephrosis. Stable appearance of left renal cortical cysts from prior comparison without abnormal complex features on limited noncontrast evaluation. GI/Bowel: No focal thickening or disproportion dilatation of bowel. No inflammatory findings. Intraluminal contrast extends through the colonic segments and rectum. Pelvis: No suspicious pelvic lesion or bulky pelvic adenopathy/free fluid. Peritoneum/Retroperitoneum: No bulky retroperitoneal adenopathy.  No suspicious peritoneal or mesenteric process Vasculature: Grossly normal caliber of abdominal aorta and vasculature Bones/Soft Tissues: No acute osseous or soft tissue findings. Right hip arthroplasty in place with associated beam hardening artifact. Kidneys demonstrate stable appearance from prior comparison 08/11/2021 with left simple appearing renal cortical cystic lesions. No obstructing uropathy or complex features associated however limited by lack of intravenous contrast.  No acute findings of the abdomen or pelvis otherwise identified         Objective:   Vitals:   Vitals:    10/03/22 0800   BP: 121/62   Pulse: 95   Resp: 18   Temp: 99.6 °F (37.6 °C)   SpO2:      Patient Vitals for the past 24 hrs:   BP Temp Temp src Pulse Resp SpO2   10/03/22 0800 121/62 99.6 °F (37.6 °C) Oral 95 18 --   10/03/22 0606 (!) 119/56 98 °F (36.7 °C) Oral 91 18 98 %   10/02/22 2034 (!) 141/67 99.6 °F (37.6 °C) Oral 85 18 98 %   10/02/22 1600 (!) 148/68 100.3 °F (37.9 °C) Oral 81 18 98 %     Gen: alert, awake, nad  Skin: no rash, turgor wnl  Heent:  eomi, mmm  Neck: no bruits or jvd noted  Cardiovascular:  S1, S2 without m/r/g  Respiratory: Diminished but clear  Abdomen:  +bs, soft, nt, nd  Ext: No edema  Psychiatric: mood and affect appropriate  Musculoskeletal:  Rom, muscular strength intact      Assessment/Plan:     1) CAYETANO. Likely secondary renal hypoperfusion in the setting of decreased effective circulating volume due to profound anemia - FENa 1.2% supporting prerenal cause - Scr 4.2 upon admission has trended to 1.9 -continue current IV fluids - strict intake and output    2) Hyponatremia. hypovolemic hyponatremia -has shown good response to IV fluids    3) Anemia. History of CKD - GI following -receiving 1 unit PRBC on 930 -was on IV Ferrlecit -follow H&H      4) Hypokalemia. Mild - Supplement K as needed for goal >4.0 - follow magnesium closely as well     5)  Hypomagnesemia. Hypomagnesemia  off unknown etiology. Supplement as needed for goal of >2.0     6)  Hypocalcemia.   Serum calcium stable at 8.7  - Vitamin D and PTH levels ordered. 7) CKD Stage 3. Based on history of hypertension - Baseline scr 1.3 -> 1.6      Rexine Peabody, APRN - CNP    Patient seen and examined. No family member is present at the bedside. Chart reviewed. I had a face to face encounter with the patient. Agree with exam.    Agree with  formulation, assessment and plan as outlined above and directed by me. Addition and corrections were done as deemed appropriate. My exam and plan include:     Continue current treatment as outlined above. Okay to discharge from a renal standpoint.   We will have the patient follow up with Octavio Vaughan MD  Nephrology        Electronically signed by Julianna Fulton MD on 10/3/2022 at 2:07 PM

## 2022-10-03 NOTE — CARE COORDINATION
10/3/22 1251 CM note: COVID (+) 9/28/22. Room air. Discharge order noted. Pts discharge plan is to return home with [facility] (active with PT only). [facility] order noted- added SN. Pt had hip surgery on 8/13/22 after a fall at home. On 8/17/22 pt was transferred to [facility]. Pt had a stroke after his hip surgery and went to [facility] after his discharge from [facility]. Pt lives alone. DME: [employer], [employer], Shower chair. Pts family will provide transportation home.  Electronically signed by Estephania Arias RN on 10/3/2022 at 12:53 PM

## 2022-10-03 NOTE — CARE COORDINATION
Discharge orders noted. Follow-up in 2 to 3 weeks after discharge -patient/family to call for appointment and with questions/concerns at [phone].   Thank you for the opportunity to participate in the care of Mr. Maura Suarez MD

## 2022-10-03 NOTE — PROGRESS NOTES
Physical Therapy Treatment Note/Plan of Care    Room #:  4571/9348-76  Patient Name: Wong Ring  YOB: 1950  MRN: [id number]    Date of Service: 10/3/2022     Tentative placement recommendation: Subacute vs Home Health Physical Therapy if patient meets goals  Equipment recommendation: Patient has needed equipment       Evaluating Physical Therapist: Rosa Olvera, PT #[id number]      Specific Provider Orders/Date/Referring Provider :  09/28/22 1400    PT eval and treat  Start:  09/28/22 1400,   End:  09/28/22 1400,   ONE TIME,   Standing Count:  1 Occurrences,   R         Jennifer Mo, DO     Admitting Diagnosis:   Hypomagnesemia [E83.42]  Hyponatremia [E87.1]  Symptomatic anemia [D64.9]  Fatigue, unspecified type [R53.83]  Chronic kidney disease, unspecified CKD stage [N18.9]  Acute renal failure superimposed on stage 4 chronic kidney disease, unspecified acute renal failure type ([facility] [N17.9, N18.4]  Acute on chronic anemia [D64.9]     Fatigue    Acute on chronic anemia    Surgery: none R hip hemiarthroplasty on 8/13/22  Visit Diagnoses         Codes    Acute on chronic anemia    -  Primary D64.9    Symptomatic anemia     D64.9    Fatigue, unspecified type     R53.83    Chronic kidney disease, unspecified CKD stage     N18.9    Hyponatremia     E87.1    Hypomagnesemia     E83.42            Patient Active Problem List   Diagnosis    Closed right hip fracture, initial encounter ([facility]    Hip fracture ([facility]    Left foot drop    Stroke (cerebrum) ([facility]    Acute renal failure superimposed on stage 4 chronic kidney disease, unspecified acute renal failure type ([address]    Acute renal failure (ARF) ([address]    COVID-19        ASSESSMENT of Current Deficits Patient exhibits decreased strength, balance, endurance, and range of motion impairing functional mobility, transfers, gait , gait distance, and tolerance to activity Patient states his L LE is weaker than his R LE despite just having sx on R hip 8/13/22. Reviewed hip precautions with patient. Patient tolerates ambulation in room and step ups onto 6in step with bilateral support and cues for sequencing. Patient requires continued skilled physical therapy to address concerns listed above for increased safety and function at discharge. PHYSICAL THERAPY  PLAN OF CARE       Physical therapy plan of care is established based on physician order,  patient diagnosis and clinical assessment    Current Treatment Recommendations:    -Standing Balance: Perform strengthening exercises in standing to promote motor control with or without upper extremity support   -Transfers: Provide instruction on proper hand and foot position for adequate transfer of weight onto lower extremities and use of gait device if needed and Cues for hand placement, technique and safety. Provide stabilization to prevent fall   -Gait: Gait training, Standing activities to improve: base of support, weight shift, weight bearing , and Pregait training to emphasize: Sequencing , Upright, and Safety   -Endurance: Utilize Supervised activities to increase level of endurance to allow for safe functional mobility including transfers and gait   -Stairs: Stair training with instruction on proper technique and hand placement on rail    PT long term treatment goals are located in below grid    Patient and or family understand(s) diagnosis, prognosis, and plan of care. Frequency of treatments: Patient will be seen  daily.          Prior Level of Function: Patient ambulated with wheeled walker    Rehab Potential: fair + for baseline    Past medical history:   Past Medical History:   Diagnosis Date    Diverticulitis     Gout     Hypertension     Tobacco abuse      Past Surgical History:   Procedure Laterality Date    APPENDECTOMY      CATARACT REMOVAL WITH IMPLANT Right 2 2 15    CATARACT REMOVAL WITH IMPLANT Left 4/6/15    COLONOSCOPY  7 years ago    Dr Kayley Downey Right 8/13/2022    RIGHT HIP HEMIARTHROPLASTY performed by Doris Wong DO at [address]      child       SUBJECTIVE:    Precautions: Up with assistance, falls, alarm, Droplet plus/COVID-19, and hip precautions ,  R hip hemiarthroplasty 8/13/2022,   MRI OF THE BRAIN WITHOUT AND WITH CONTRAST  8/15/2022 12:01 pm  1. Tiny acute infarcts are seen involving the parietal lobes bilaterally,   right more than left. A punctate acute infarct also seen in the right   occipital lobe. TWO XRAY VIEWS OF THE RIGHT HIP 9/12/2022 12:06 pm   Right hip arthroplasty with no unexpected findings. Unchanged left hip   osteoarthritis. Social history: Patient lives alone in a ranch home  with 4 steps  to enter with Rail  Tub shower grab bars    Equipment owned: Browsy,       [address]   How much difficulty turning over in bed?: [address]  How much difficulty sitting down on / standing up from a chair with arms?: A Little  How much difficulty moving from lying on back to sitting on side of bed?: A Little  How much help from another person moving to and from a bed to a chair?: A Little  How much help from another person needed to walk in hospital room?: A Lot  How much help from another person for climbing 3-5 steps with a railing?: A Lot  AM-PAC Inpatient Mobility Raw Score : 16  AM-PAC Inpatient T-Scale Score : 40.78  Mobility Inpatient CMS 0-100% Score: 54.16  Mobility Inpatient CMS G-Code Modifier : CK    Nursing cleared patient for PT treatment. OBJECTIVE;   Initial Evaluation  Date: 9/29/2022 Treatment Date:  10/3/2022       Short Term/ Long Term   Goals   Was pt agreeable to Eval/treatment?  Yes yes To be met in 4 days   Pain level   0/10    None reported    Bed Mobility    Rolling: Not assessed     Supine to sit: Minimal assist of 1    Sit to supine: Not assessed     Scooting: Minimal assist of 1   Rolling: Supervision    Supine to sit: Supervision    Sit to supine: Not assessed patient in chair   Scooting: Supervision     Rolling: Independent    Supine to sit:  Independent    Sit to supine: Independent    Scooting: Independent     Transfers Sit to stand: Minimal assist of 1 from bed, moderate assist from low surface Sit to stand: Supervision  Cues for hand placement and safety     Sit to stand: Modified Independent     Ambulation     1 x 40 feet, 1 x 20 feet using  wheeled walker with Minimal assist of 1   for safety and cues for upright posture and pacing 15 feet using  wheeled walker with Minimal assist of 1   cues for sequencing, upright posture, safety, and pacing     100 feet using  wheeled walker with Modified Independent    Stair negotiation: ascended and descended   Not assessed  Patient performed 4 step ups onto 6in step with bilateral support and Mod assist for safety and cues for proper sequencing   4 steps 1 rail with supervision    ROM    Left foot drop    Increase range of motion 10% of affected joints    Strength BUE:  refer to OT parveenal  RLE:  3+/5  LLE:  3/5 DF 2/5  Increase strength in affected mm groups by 1/3 grade   Balance Sitting EOB:  fair +  Dynamic Standing:  fair wheeled walker  Sitting EOB: good   Dynamic Standing: fair wheeled walker   Sitting EOB:  good    Dynamic Standing: good wheeled walker      Patient is Alert & Oriented x person, place, time, and situation and follows directions    Sensation:  Patient  denies numbness/tingling   Edema:  no   Endurance: fair       Vitals: room air   Blood Pressure at rest  Blood Pressure during session    Heart Rate at rest  Heart Rate during session      SPO2 at rest %  SPO2 during session %     Patient education  Patient educated on role of Physical Therapy, risks of immobility, safety and plan of care, energy conservation,  importance of mobility while in hospital , safety , stair training , and seated exercises and hamstring stretching      Patient response to education:   Pt verbalized understanding Pt demonstrated skill Pt requires further education in this area   Yes Partial Yes      Treatment:  Patient practiced and was instructed/facilitated in the following treatment: Patient assisted to edge of bed and Sat edge of bed 10 minutes with Supervision  to increase dynamic sitting balance and activity tolerance. Performed seated exercises. Patient stood to amb in room and back to bedside to rest. Patient performed steps ups and back to bedside chair. Performed further exercises and education no hamstring stretch for L LE. Therapeutic Exercises:  ankle pumps, long arc quad, seated marching, and manually resisted hamstring curl, hamstring stretch ,  x 10-15 reps. AROM    At end of session, patient in chair with     call light and phone within reach,  all lines and tubes intact, nursing notified. Patient would benefit from continued skilled Physical Therapy to improve functional independence and quality of life.          Patient's/ family goals   home    Time in  1028  Time out  1108    Total Treatment Time  40 minutes      CPT codes:  Therapeutic activities (47209)   25 minutes  2 unit(s)  Therapeutic exercises (16034)   15 minutes  1 unit(s)    Denzel Lopez, PTA  #[id number]

## 2022-10-03 NOTE — DISCHARGE INSTRUCTIONS
Your information:  Name: Jv Cardoso  : 1950    Your instructions:    YOU ARE BEING DISCHARGED HOME. PLEASE MAKE AND KEEP ALL FOLLOW-UP APPOINTMENTS. IF YOU EXPERIENCE CHEST PAIN, SHORTNESS OF BREATH, SWEATING, LIGHTHEADEDNESS, OR PALPITATIONS: CALL 911 OR GO TO THE EMERGENCY DEPARTMENT IMMEDIATELY. If you begin to feel unwell or symptoms persist, contact your physician. What to do after you leave the hospital:    Recommended diet: regular diet    Recommended activity: activity as tolerated        The following personal items were collected during your admission and were returned to you:    Belongings  Dental Appliances: None  Vision - Corrective Lenses: Eyeglasses (Readers)  Hearing Aid: None  Clothing: Pants, Undergarments, Socks, Jacket/Coat  Jewelry: None  Body Piercings Removed: N/A  Electronic Devices: Cell Phone  Weapons (Notify Protective Services/Security): None  Other Valuables: At home  Home Medications: None  Valuables Given To: Other (Comment)  Provide Name(s) of Who Valuable(s) Were Given To: Audrey  Responsible person(s) in the waiting room: n/a  Patient approves for provider to speak to responsible person post operatively: No    Information obtained by:  By signing below, I understand that if any problems occur once I leave the hospital I am to contact Deny Blair. I understand and acknowledge receipt of the instructions indicated above.

## 2022-10-04 ENCOUNTER — CARE COORDINATION (OUTPATIENT)
Dept: CASE MANAGEMENT | Age: 72
End: 2022-10-04

## 2022-10-04 LAB — PATHOLOGIST REVIEW: NORMAL

## 2022-10-04 NOTE — CARE COORDINATION
[facility] Care Transitions Initial Follow Up Call    Call within 2 business days of discharge: Yes    Care Transition Nurse attempted initial CV-19 outreach leaving Hippa KELSEY w/ my info. Advised to schedule 7 day hosp fu PC. Patient: Marlyn Day Patient : 1950   MRN: <[id number]>  Reason for Admission: 2022 - 10/3/2022 [address] (pos 22), Renal failure, Anemia, Recent fall w/ hip sx and post-op stroke w/ left side weakness and foot drop. Discharge Date: 10/3/22 RARS: Readmission Risk Score: 21.6  NR  CV-19    Last Discharge  Street       Date Complaint Diagnosis Description Type Department Provider    22 Fatigue Acute on chronic anemia . .. ED to Hosp-Admission (Discharged) (ADMITTED) [id number] Monica Soni DO; Gasper Carmona. .. [place] [facility] sos 10/5/22    R Hip post-op B Ayla 10/10 8:15    START taking:  aspirin  Start taking on: 2022  This replaces a similar medication. See the full medication  list for instructions. cholestyramine (QUESTRAN)  melatonin  nicotine (Vicenta Jacks)  Start taking on: 2022  CHANGE how you take:  pantoprazole (PROTONIX)  sodium bicarbonate  STOP taking:  aspirin 325 MG EC tablet  Replaced by a similar medication.   aspirin 325 MG tablet    Charito Adame RN

## 2022-10-06 NOTE — PROGRESS NOTES
CLINICAL PHARMACY NOTE: MEDS TO BEDS    Total # of Prescriptions Filled: 1   The following medications were delivered to the patient:  Pantoprazole 40 mg    Additional Documentation:   Cholestyramine 4 mg packets was over $100, patient will check with his Dr for cheaper medication

## 2022-10-07 DIAGNOSIS — Z96.641 HISTORY OF RIGHT HIP HEMIARTHROPLASTY: Primary | ICD-10-CM

## 2022-10-10 ENCOUNTER — OFFICE VISIT (OUTPATIENT)
Dept: ORTHOPEDIC SURGERY | Age: 72
End: 2022-10-10

## 2022-10-10 VITALS — BODY MASS INDEX: 24.86 KG/M2 | WEIGHT: 164 LBS | HEIGHT: 68 IN

## 2022-10-10 DIAGNOSIS — Z96.641 HISTORY OF RIGHT HIP HEMIARTHROPLASTY: Primary | ICD-10-CM

## 2022-10-10 PROCEDURE — 99024 POSTOP FOLLOW-UP VISIT: CPT | Performed by: NURSE PRACTITIONER

## 2022-10-12 NOTE — PROGRESS NOTES
Subjective: Marvel Moya is now 6 weeks post right hip hemiarthroplasty. DOS: 8/13/22. Pain is controlled without current analgesics. The patient denies  fever, wound drainage, increasing redness, pus, increasing pain, increasing swelling. Post op problems reported:  increasing pain. He is ambulating with wheeled walker. Objective:      General :    alert, appears stated age, and cooperative   Gait:  Antalgic. Sutures:   Sutures out. Incision:  healing well, no significant drainage, no dehiscence, no significant erythema   Tenderness:  mild   Flexion ROM:  diminished range with pain   Extension ROM:  diminished range with pain   Abduction ROM:  diminished range with pain   DVT Evaluation:  No evidence of DVT seen on physical exam.  Negative Sandrita's sign. No cords or calf tenderness. Imaging:  Right with no signs of aseptic loosening  Radiographic findings reviewed with patient     Assessment:     Encounter Diagnosis   Name Primary? History of right hip hemiarthroplasty Yes       Plan:     He will continue with home PT I will see him back  in 2 month  Please call office with any questions or concerns at [phone].

## 2022-11-17 ENCOUNTER — TELEPHONE (OUTPATIENT)
Dept: PHARMACY | Facility: CLINIC | Age: 72
End: 2022-11-17

## 2022-11-17 NOTE — TELEPHONE ENCOUNTER
[facility] CLINICAL PHARMACY: ADHERENCE REVIEW  Identified care gap per [employer]: fills at [facility]: ACE/ARB and Statin adherence    Last Visit: unknown    Patient not found in [facility] [place]    [address] Records claims through 11/05/22 (Prior Year [place] =  99%; YTD [facility] =  81%; Potential Fail Date: 11/20/22 ):   LISINOPRIL   TAB 10MG last filled on 09/02/22 for 30 day supply. Next refill due: 10/04/22    Per  [facility] Portal:  LISINOPRIL   TAB 10MG last filled on 09/02/22 for 30 day supply. Per [facility]:   LISINOPRIL   TAB 10MG last picked up on 09/02/22 for 30 day supply. 0 refills remaining. [facility]    BP Readings from Last 3 Encounters:   10/03/22 121/62   08/17/22 (!) 163/72   09/03/21 (!) 167/69     Estimated Creatinine Clearance: 34 mL/min (A) (based on SCr of 1.9 mg/dL (H)). [address] Records claims through 11/05/22 (Prior Year [place] =  0%; YTD [place] =  92%; Potential Fail Date: 11/25/22 ):   ATORVASTATIN TAB 80MG last filled on 09/29/22 for 30 day supply. Next refill due: 11/01/22    Per  [facility] Portal:  ATORVASTATIN TAB 80MG last filled on 09/29/22 for 30 day supply. Per [facility]:   ATORVASTATIN TAB 80MG last picked up on 11/13/22 for 90 day supply. 1 refills remaining. Billed through [place]   Component Value Date    CHOL 61 09/29/2022    TRIG 73 09/29/2022    HDL 22 09/29/2022    LDLCALC 24 09/29/2022     ALT   Date Value Ref Range Status   10/03/2022 29 0 - 40 U/L Final     AST   Date Value Ref Range Status   10/03/2022 26 0 - 39 U/L Final     The ASCVD Risk score (Mary Jane DIAS, et al., 2019) failed to calculate for the following reasons:     The patient has a prior MI or stroke diagnosis     PLAN  The following are interventions that have been identified:   - Patient eligible for 90 day supply of Lisinopril     Attempting to reach patient to review changing prescription from a 30-day supply to a 90-day supply. Left message asking for return call    Called patient to see if he is still taking Lisinopril and if so, would he like to switch to a 90ds?      Future Appointments   Date Time Provider Negra Shah   12/12/2022  8:00 AM VLADIMIR Page - TOM[facility]S Stein [facility]   1/4/2023  8:00 AM Judy Li DO [address]   [place] [address] // Department, toll free [phone], Option 3

## 2022-11-17 NOTE — LETTER
[place]  [address], [address]        [facility] [address] [facility] [address]           11/29/22     Dear Esperanza García,    We tried to reach you recently regarding your LISINOPRIL   TAB 10MG, but were unable to reach you on the telephone. We have on file that you are currently taking LISINOPRIL   TAB 10MG. If you are no longer taking or taking differently, please call us at the number below so that we can discuss this and update your medication profile. It appears that this medication has not been filled at proper times. We are worried you might be missing doses or not taking it as directed. It is important that you take your medications regularly and try not to miss a single dose.     Some ways to help you remember to take and refill your medications are to:  · Use a pill box, set an alarm, and/or keep your medication near something that you do every day  · Fill a 3-month supply of your prescription at a time to save you time and trips to the pharmacy - if you would like assistance in switching your prescriptions to a 3-month supply, please contact us  · Ask your pharmacy if they participate in [facility]", a program where you can  all of your medications on the same day  · Ask your pharmacy if you can be set up with automatic refill, where they will automatically refill your prescription when it is due and let you know it's ready to     Sincerely,   [address] [address]  // Department, toll free [phone], Option 2

## 2022-11-29 NOTE — TELEPHONE ENCOUNTER
2nd attempt to contact this patient regarding the previous message**    CLINICAL PHARMACY: ADHERENCE REVIEW  Patient unavailable at the time of call. Left following message on home TAD: please call back at toll-free [phone] option 1 to retrieve previous message. Letter mailed to patient.     Sincerely,   [address]  // Department, toll free [phone], Option 1      For Pharmacy Admin Tracking Only    CPA in place:  No  Intervention Detail: Adherence Monitorin  Gap Closed?: No   Time Spent (min): 10

## 2022-12-08 NOTE — TELEPHONE ENCOUNTER
Patient returned call and stated he is no longer taking this medication. His doctor stopped Lisinopril due to good bp.  I let him know we'll get this information updated

## 2022-12-09 DIAGNOSIS — Z96.641 HISTORY OF RIGHT HIP HEMIARTHROPLASTY: Primary | ICD-10-CM

## 2022-12-12 ENCOUNTER — OFFICE VISIT (OUTPATIENT)
Dept: ORTHOPEDIC SURGERY | Age: 72
End: 2022-12-12
Payer: MEDICARE

## 2022-12-12 VITALS — WEIGHT: 168 LBS | TEMPERATURE: 98 F | BODY MASS INDEX: 25.46 KG/M2 | HEIGHT: 68 IN

## 2022-12-12 DIAGNOSIS — Z96.641 HISTORY OF RIGHT HIP HEMIARTHROPLASTY: Primary | ICD-10-CM

## 2022-12-12 PROCEDURE — 99213 OFFICE O/P EST LOW 20 MIN: CPT | Performed by: NURSE PRACTITIONER

## 2022-12-12 PROCEDURE — 1123F ACP DISCUSS/DSCN MKR DOCD: CPT | Performed by: NURSE PRACTITIONER

## 2022-12-12 NOTE — PROGRESS NOTES
Chief Complaint   Patient presents with    Hip Pain     Right hip MARK f/u DOS 8/13/22. Doing well only pain when turning the wrong way       Delores Serrano returns today for follow-up of his right hip hemiarthroplasty. DOS: 8/13/22. He reports this is better than when I saw the patient last. Patient is having issues with b/l lower extremity edema. He is using walker for his knees.      Past Medical History:   Diagnosis Date    Diverticulitis     Gout     Hypertension     Tobacco abuse      Past Surgical History:   Procedure Laterality Date    APPENDECTOMY      CATARACT REMOVAL WITH IMPLANT Right 2 2 15    CATARACT REMOVAL WITH IMPLANT Left 4/6/15    COLONOSCOPY  7 years ago    Dr Toby Cardenas Right 8/13/2022    RIGHT HIP HEMIARTHROPLASTY performed by Zac Melgar DO at [address]      child       Current Outpatient Medications:     aspirin 81 MG chewable tablet, Take 1 tablet by mouth daily, Disp: 30 tablet, Rfl: 0    nicotine (NICODERM CQ) 14 MG/24HR, Place 1 patch onto the skin daily, Disp: 30 patch, Rfl: 0    clopidogrel (PLAVIX) 75 MG tablet, Take 75 mg by mouth every morning, Disp: , Rfl:     atorvastatin (LIPITOR) 80 MG tablet, Take 80 mg by mouth Daily with supper, Disp: , Rfl:     allopurinol (ZYLOPRIM) 100 MG tablet, Take 100 mg by mouth every morning, Disp: , Rfl:     ammonium lactate (LAC-HYDRIN) 12 % lotion, Apply 1 g topically in the morning and at bedtime Apply topically to both heels, Disp: , Rfl:     oxyCODONE-acetaminophen (PERCOCET) 5-325 MG per tablet, Take 1 tablet by mouth every 6 hours as needed for Pain., Disp: , Rfl:     hydroxychloroquine (PLAQUENIL) 200 MG tablet, Take 200 mg by mouth every morning, Disp: , Rfl:     Vitamin D (CHOLECALCIFEROL) 25 MCG (1000 UT) TABS tablet, Take 1,000 Units by mouth every morning, Disp: , Rfl:     cholestyramine (QUESTRAN) 4 g packet, Take 1 packet by mouth 2 times daily, Disp: 60 packet, Rfl: 0    melatonin 5 MG TBDP disintegrating tablet, Take 2 tablets by mouth nightly, Disp: 60 tablet, Rfl: 0    pantoprazole (PROTONIX) 40 MG tablet, Take 1 tablet by mouth 2 times daily (before meals), Disp: 60 tablet, Rfl: 0  No Known Allergies  Social History     Socioeconomic History    Marital status:      Spouse name: Not on file    Number of children: Not on file    Years of education: Not on file    Highest education level: Not on file   Occupational History    Not on file   Tobacco Use    Smoking status: Former     Packs/day: 1.50     Years: 40.00     Pack years: 60.00     Types: Cigarettes    Smokeless tobacco: Never   Vaping Use    Vaping Use: Never used   Substance and Sexual Activity    Alcohol use: Yes     Comment: beer daily    Drug use: No    Sexual activity: Not on file   Other Topics Concern    Not on file   Social History Narrative    Not on file     Social Determinants of Health     Financial Resource Strain: Not on file   Food Insecurity: Not on file   Transportation Needs: Not on file   Physical Activity: Not on file   Stress: Not on file   Social Connections: Not on file   Intimate Partner Violence: Not on file   Housing Stability: Not on file       Review of Systems:   Skin: (-) rash,(-) psoriasis,(-) eczema, (-)skin cancer. Musculoskeletal: (-) fractures,  (-) dislocations,(-) collagen vascular disease, (-) fibromyalgia, (-) multiple sclerosis, (-) muscular dystrophy, (-) RSD,(-) joint pain (-)swelling, (-) joint pain,swelling. Neurologic: (-) epilepsy, (-)seizures,(-) brain tumor,(-) TIA, (-)stroke, (-)headaches, (-)Parkinson disease,(-) memory loss, (-) LOC. Cardiovascular: (-) Chest pain, (-) swelling in legs/feet, (-) SOB, (-) cramping in legs/feet with walking. Subjective:    Constitutional:  The patient is alert and oriented x 3, appears to be stated age and in no distress.    Temp 98 °F (36.7 °C)   Ht 5' 7.5\" (1.715 m)   Wt 168 lb (76.2 kg)   BMI 25.92 kg/m²     Skin:  Upon inspection: the skin appears warm, dry and intact. There is a previous scar over the affected area. There is not any cellulitis, lymphedema or cutaneous lesions noted in the lower extremities. Upon palpation there is no induration noted. Neurologic:  Gait: abnormal;  Motor exam of the lower extremities show ; quadriceps, hamstrings, foot dorsi and plantar flexors intact R.  5/5 and L. 5/5. Deep tendon reflexes are 2/4 at the knees and 2/4 at the ankles with strong extensor hallicus longus motor strength bilaterally. Sensory to both feet is intact to all sensory roots. Cardiovascular: The vascular exam is normal and is well perfused to distal extremities. Distal pulses DP/PT: R. 2+; L. 2+. There is cap refill noted less than two seconds in all digits. There is edema of the bilateral lower extremities. There is not varicosities noted in the distal extremities. Lymph:  Upon palpation,  there is no lymphadenopathy noted in bilateral lower extremities. Musculoskeletal:    Lumbar exam:  On visual inspection, there is not deformity of the spine. full range of motion, no tenderness, palpable spasm or pain on motion. Special tests: Straight Leg Raise negative, [place] testnegative. Hip Exam:  Exam of the right hip shows none leg length discrepancy. He has a antalgic gait Range of motion of the involved hip is normal, the hip joint is stable to testing. Strength of the lower extremity is equal bilaterally. Patient complains of tenderness at the  groin. Xrays: right hip hemiarthroplasty well aligned with no signs of aseptic loosening    Impression:   Encounter Diagnosis   Name Primary?     History of right hip hemiarthroplasty Yes       Plan:   Hep  Activity as tolerated  Pt seeing vascular surgeon this week for b/l le edema/circulation issues  Fu in 3 months with xr

## 2023-01-04 ENCOUNTER — OFFICE VISIT (OUTPATIENT)
Dept: RHEUMATOLOGY | Age: 73
End: 2023-01-04
Payer: MEDICARE

## 2023-01-04 VITALS
HEART RATE: 70 BPM | SYSTOLIC BLOOD PRESSURE: 126 MMHG | RESPIRATION RATE: 18 BRPM | OXYGEN SATURATION: 98 % | WEIGHT: 170 LBS | DIASTOLIC BLOOD PRESSURE: 80 MMHG | BODY MASS INDEX: 25.76 KG/M2 | HEIGHT: 68 IN

## 2023-01-04 DIAGNOSIS — Z79.899 HIGH RISK MEDICATION USE: ICD-10-CM

## 2023-01-04 DIAGNOSIS — N18.32 STAGE 3B CHRONIC KIDNEY DISEASE (HCC): ICD-10-CM

## 2023-01-04 DIAGNOSIS — M06.09 RHEUMATOID ARTHRITIS OF MULTIPLE SITES WITH NEGATIVE RHEUMATOID FACTOR (HCC): ICD-10-CM

## 2023-01-04 DIAGNOSIS — M1A.39X0 CHRONIC GOUT DUE TO RENAL IMPAIRMENT OF MULTIPLE SITES WITHOUT TOPHUS: ICD-10-CM

## 2023-01-04 DIAGNOSIS — M06.09 RHEUMATOID ARTHRITIS OF MULTIPLE SITES WITH NEGATIVE RHEUMATOID FACTOR (HCC): Primary | ICD-10-CM

## 2023-01-04 DIAGNOSIS — I65.21 STENOSIS OF RIGHT CAROTID ARTERY: ICD-10-CM

## 2023-01-04 LAB
C-REACTIVE PROTEIN: 1.2 MG/DL (ref 0–0.4)
RHEUMATOID FACTOR: <10 IU/ML (ref 0–13)
SEDIMENTATION RATE, ERYTHROCYTE: 66 MM/HR (ref 0–15)
URIC ACID, SERUM: 8.5 MG/DL (ref 3.4–7)

## 2023-01-04 PROCEDURE — 1123F ACP DISCUSS/DSCN MKR DOCD: CPT | Performed by: INTERNAL MEDICINE

## 2023-01-04 PROCEDURE — 99205 OFFICE O/P NEW HI 60 MIN: CPT | Performed by: INTERNAL MEDICINE

## 2023-01-04 RX ORDER — HYDROXYCHLOROQUINE SULFATE 200 MG/1
200 TABLET, FILM COATED ORAL EVERY MORNING
Qty: 90 TABLET | Refills: 3 | Status: SHIPPED | OUTPATIENT
Start: 2023-01-04

## 2023-01-04 RX ORDER — BUMETANIDE 1 MG/1
TABLET ORAL
COMMUNITY
Start: 2022-12-12

## 2023-01-04 RX ORDER — POTASSIUM CHLORIDE 750 MG/1
TABLET, FILM COATED, EXTENDED RELEASE ORAL
COMMUNITY
Start: 2022-11-17

## 2023-01-04 RX ORDER — ALLOPURINOL 100 MG/1
100 TABLET ORAL EVERY MORNING
Qty: 90 TABLET | Refills: 3 | Status: SHIPPED | OUTPATIENT
Start: 2023-01-04

## 2023-01-04 RX ORDER — FAMOTIDINE 40 MG/1
TABLET, FILM COATED ORAL
COMMUNITY
Start: 2022-12-14

## 2023-01-04 RX ORDER — GABAPENTIN 300 MG/1
CAPSULE ORAL
COMMUNITY
Start: 2022-12-11

## 2023-01-04 ASSESSMENT — ENCOUNTER SYMPTOMS
ABDOMINAL PAIN: 0
COLOR CHANGE: 0
COUGH: 0
DIARRHEA: 0
VOMITING: 0
NAUSEA: 0
SHORTNESS OF BREATH: 0
TROUBLE SWALLOWING: 0

## 2023-01-04 NOTE — PROGRESS NOTES
Shahram Padron 1950 is a 81 y.o. male, here for evaluation of the following chief complaint(s):  New Patient (Patient here as a new patient for RA. )         ASSESSMENT/PLAN:    Shahram Padron 1950 is a 81 y.o. male seen in Consult for rheumatoid arthritis. 1.  Rheumatoid arthritis-this is a historical diagnosis. Seems to be well controlled on just Plaquenil 200 mg daily. I see no striking synovitis on exam today so I see no indication to escalate therapy to immunosuppressive's at this point, but will need further work-up as below to get a better characterization of his disease. 2.  Medication monitoring-he follows with the eye doctor regularly while on Plaquenil. 3.  Chronic gout-no signs of acute attack currently. Continue allopurinol 100 mg daily. We will update uric acid level. If we need to adjust allopurinol would adjust in 50 mg increments given CKD 3.    4.  CKD 3-avoid methotrexate and systemic NSAIDs. 5.  Carotid artery disease-status post stenting. Patients with inflammatory arthritis have an increased cardiovascular risk. He is on a statin. 1. Rheumatoid arthritis of multiple sites with negative rheumatoid factor (HCC)  -     hydroxychloroquine (PLAQUENIL) 200 MG tablet; Take 1 tablet by mouth every morning, Disp-90 tablet, R-3Normal  -     C-Reactive Protein; Future  -     Sedimentation Rate; Future  -     Rheumatoid Factor; Future  -     Cyclic Citrul Peptide Antibody, IgG; Future  -     XR HAND LEFT (MIN 3 VIEWS); Future  -     XR HAND RIGHT (MIN 3 VIEWS); Future  -     XR FOOT LEFT (MIN 3 VIEWS); Future  -     XR FOOT RIGHT (MIN 3 VIEWS); Future  -     Uric Acid; Future  2. High risk medication use  -     hydroxychloroquine (PLAQUENIL) 200 MG tablet; Take 1 tablet by mouth every morning, Disp-90 tablet, R-3Normal  -     C-Reactive Protein; Future  -     Sedimentation Rate; Future  -     Rheumatoid Factor;  Future  -     Cyclic Citrul Peptide Antibody, IgG; Future  -     Uric Acid; Future  3. Chronic gout due to renal impairment of multiple sites without tophus  -     Uric Acid; Future  4. Stage 3b chronic kidney disease ([facility] [address]  5. Stenosis of right carotid artery      Return in about 6 months (around 7/4/2023). Subjective   SUBJECTIVE/OBJECTIVE:    HPI: Enedina Quinones 1950 is a 81 y.o. male seen in consult for rheumatoid arthritis. Patient states he was diagnosed 8 or 10 years ago. His previous rheumatologist retired. He has done pretty well for many years now on just Plaquenil 200 mg daily. He states he will get a little bit of pain in the hands at times. Otherwise he has known significant osteoarthritis and recently had a right hip replacement. He has OA and rotator cuff arthropathy in the shoulders. Also OA in the knees. He does follow with Ortho. He denies any joint swelling. His biggest issue lately has been neuropathy. He has CKD 3. He has gout well-controlled on allopurinol 100 mg daily. Past Medical History:   Diagnosis Date    Diverticulitis     Gout     Hypertension     Tobacco abuse         Review of Systems   Constitutional:  Negative for fatigue and fever. HENT:  Negative for mouth sores and trouble swallowing. Respiratory:  Negative for cough and shortness of breath. Cardiovascular:  Negative for chest pain. Gastrointestinal:  Negative for abdominal pain, diarrhea, nausea and vomiting. Genitourinary:  Negative for dysuria and hematuria. Musculoskeletal:  Positive for arthralgias. Negative for joint swelling. Skin:  Negative for color change and rash. Neurological:  Negative for weakness and numbness. Hematological:  Negative for adenopathy. All other systems reviewed and are negative. Objective   Vitals:    01/04/23 0808   BP: 126/80   Pulse: 70   Resp: 18   SpO2: 98%      Physical Exam  Constitutional:       General: He is not in acute distress. Appearance: Normal appearance.    HENT: Head: Normocephalic and atraumatic. Right Ear: External ear normal.      Left Ear: External ear normal.      Nose: Nose normal.   Eyes:      General: No scleral icterus. Pulmonary:      Effort: Pulmonary effort is normal.   Musculoskeletal:         General: Deformity present. No swelling or tenderness. Comments: He has Heberden's and Dayanara's nodes but no synovitis on exam.   Skin:     General: Skin is warm and dry. Findings: No rash. Neurological:      General: No focal deficit present. Mental Status: He is alert and oriented to person, place, and time. Mental status is at baseline. Psychiatric:         Mood and Affect: Mood normal.         Behavior: Behavior normal.          Lab Results   Component Value Date    WBC 12.5 (H) 10/03/2022    HGB 8.0 (L) 10/03/2022    HCT 24.5 (L) 10/03/2022    MCV 91.8 10/03/2022     10/03/2022     Lab Results   Component Value Date     (L) 10/03/2022    K 3.6 10/03/2022     10/03/2022    CO2 20 (L) 10/03/2022    BUN 25 (H) 10/03/2022    CREATININE 1.9 (H) 10/03/2022    GLUCOSE 109 (H) 10/03/2022    CALCIUM 8.7 10/03/2022    PROT 5.9 (L) 10/03/2022    LABALBU 2.3 (L) 10/03/2022    BILITOT 0.5 10/03/2022    ALKPHOS 128 10/03/2022    AST 26 10/03/2022    ALT 29 10/03/2022    LABGLOM 35 10/03/2022    GFRAA 43 10/03/2022     Lab Results   Component Value Date    MARLIN NEGATIVE 05/28/2015     No results found for: RHEUMFACTOR  Lab Results   Component Value Date    SEDRATE 60 (H) 08/15/2022     Lab Results   Component Value Date    CRP 8.9 (H) 08/15/2022            An electronic signature was used to authenticate this note. This note was generated with a voice recognition dictation system. Please disregard any errors or omission which have escaped my review.     --Tian Fuentes, DO

## 2023-01-06 LAB — CYCLIC CITRULLINATED PEPTIDE ANTIBODY IGG: 1.1 U/ML (ref 0–7)

## 2023-01-19 ENCOUNTER — TELEPHONE (OUTPATIENT)
Dept: RHEUMATOLOGY | Age: 73
End: 2023-01-19

## 2023-01-19 NOTE — TELEPHONE ENCOUNTER
Patient is notified of his results.       Electronically signed by Doroteo Dykes CMA on 1/19/2023 at 1:54 PM

## 2023-01-19 NOTE — TELEPHONE ENCOUNTER
Patient had his xrays completed on 01/16/2023 at [address] [employer] [facility] Reports in media. Please review and advise further instructions.     Electronically signed by Anette Davis CMA on 1/19/2023 at 1:43 PM

## 2023-03-09 DIAGNOSIS — Z96.641 HISTORY OF RIGHT HIP HEMIARTHROPLASTY: Primary | ICD-10-CM

## 2023-03-14 ENCOUNTER — OFFICE VISIT (OUTPATIENT)
Dept: ORTHOPEDIC SURGERY | Age: 73
End: 2023-03-14
Payer: MEDICARE

## 2023-03-14 VITALS — HEIGHT: 68 IN | TEMPERATURE: 98 F | WEIGHT: 170 LBS | BODY MASS INDEX: 25.76 KG/M2

## 2023-03-14 DIAGNOSIS — M75.42 SHOULDER IMPINGEMENT, LEFT: Primary | ICD-10-CM

## 2023-03-14 DIAGNOSIS — Z96.641 HISTORY OF RIGHT HIP HEMIARTHROPLASTY: ICD-10-CM

## 2023-03-14 PROCEDURE — 1123F ACP DISCUSS/DSCN MKR DOCD: CPT | Performed by: ORTHOPAEDIC SURGERY

## 2023-03-14 PROCEDURE — 99213 OFFICE O/P EST LOW 20 MIN: CPT | Performed by: ORTHOPAEDIC SURGERY

## 2023-03-14 PROCEDURE — 20610 DRAIN/INJ JOINT/BURSA W/O US: CPT | Performed by: ORTHOPAEDIC SURGERY

## 2023-03-14 RX ORDER — TRIAMCINOLONE ACETONIDE 40 MG/ML
40 INJECTION, SUSPENSION INTRA-ARTICULAR; INTRAMUSCULAR ONCE
Status: COMPLETED | OUTPATIENT
Start: 2023-03-14 | End: 2023-03-14

## 2023-03-14 RX ADMIN — TRIAMCINOLONE ACETONIDE 40 MG: 40 INJECTION, SUSPENSION INTRA-ARTICULAR; INTRAMUSCULAR at 09:04

## 2023-03-14 NOTE — PROGRESS NOTES
Chief Complaint   Patient presents with    Hip Pain     Right Hip MARK DOS 08/13/2022, doing ok walking with a walker. Bobby Spangler returns today for follow-up of his right hip hemiarthroplasty. DOS: 8/13/22. He reports this is better than when I saw the patient last. Patient is having issues with b/l lower extremity edema. He is using walker for his knees.      Past Medical History:   Diagnosis Date    Diverticulitis     Gout     Hypertension     Tobacco abuse      Past Surgical History:   Procedure Laterality Date    APPENDECTOMY      CATARACT REMOVAL WITH IMPLANT Right 2 2 15    CATARACT REMOVAL WITH IMPLANT Left 4/6/15    COLONOSCOPY  7 years ago    Dr Gerardo Onofre Right 8/13/2022    RIGHT HIP HEMIARTHROPLASTY performed by Ever Sanchez DO at [address]      child       Current Outpatient Medications:     bumetanide (BUMEX) 1 MG tablet, TAKE 1/2 TABLET BY MOUTH ONCE DAILY, Disp: , Rfl:     potassium chloride (KLOR-CON) 10 MEQ extended release tablet, TAKE 1 TABLET BY MOUTH ONCE EACH DAY DO NOT CRUSH, CHEW, OR SPLIT., Disp: , Rfl:     gabapentin (NEURONTIN) 300 MG capsule, TAKE 1 CAPSULE (300 MG TOTAL) BY MOUTH AT BEDTIME., Disp: , Rfl:     famotidine (PEPCID) 40 MG tablet, TAKE 1 TABLET BY MOUTH EVERY DAY, Disp: , Rfl:     allopurinol (ZYLOPRIM) 100 MG tablet, Take 1 tablet by mouth every morning, Disp: 90 tablet, Rfl: 3    hydroxychloroquine (PLAQUENIL) 200 MG tablet, Take 1 tablet by mouth every morning, Disp: 90 tablet, Rfl: 3    aspirin 81 MG chewable tablet, Take 1 tablet by mouth daily, Disp: 30 tablet, Rfl: 0    nicotine (NICODERM CQ) 14 MG/24HR, Place 1 patch onto the skin daily, Disp: 30 patch, Rfl: 0    clopidogrel (PLAVIX) 75 MG tablet, Take 75 mg by mouth every morning, Disp: , Rfl:     atorvastatin (LIPITOR) 80 MG tablet, Take 80 mg by mouth Daily with supper, Disp: , Rfl:     ammonium lactate (LAC-HYDRIN) 12 % lotion, Apply 1 g topically in the morning and at bedtime Apply topically to both heels, Disp: , Rfl:     oxyCODONE-acetaminophen (PERCOCET) 5-325 MG per tablet, Take 1 tablet by mouth every 6 hours as needed for Pain., Disp: , Rfl:     Vitamin D (CHOLECALCIFEROL) 25 MCG (1000 UT) TABS tablet, Take 1,000 Units by mouth every morning, Disp: , Rfl:     cholestyramine (QUESTRAN) 4 g packet, Take 1 packet by mouth 2 times daily, Disp: 60 packet, Rfl: 0    melatonin 5 MG TBDP disintegrating tablet, Take 2 tablets by mouth nightly, Disp: 60 tablet, Rfl: 0    pantoprazole (PROTONIX) 40 MG tablet, Take 1 tablet by mouth 2 times daily (before meals), Disp: 60 tablet, Rfl: 0  No Known Allergies  Social History     Socioeconomic History    Marital status:      Spouse name: Not on file    Number of children: Not on file    Years of education: Not on file    Highest education level: Not on file   Occupational History    Not on file   Tobacco Use    Smoking status: Former     Packs/day: 1.50     Years: 40.00     Pack years: 60.00     Types: Cigarettes    Smokeless tobacco: Never   Vaping Use    Vaping Use: Never used   Substance and Sexual Activity    Alcohol use: Yes     Comment: beer daily    Drug use: No    Sexual activity: Not on file   Other Topics Concern    Not on file   Social History Narrative    Not on file     Social Determinants of Health     Financial Resource Strain: Not on file   Food Insecurity: Not on file   Transportation Needs: Not on file   Physical Activity: Not on file   Stress: Not on file   Social Connections: Not on file   Intimate Partner Violence: Not on file   Housing Stability: Not on file       Review of Systems:   Skin: (-) rash,(-) psoriasis,(-) eczema, (-)skin cancer.   Musculoskeletal: (-) fractures,  (-) dislocations,(-) collagen vascular disease, (-) fibromyalgia, (-) multiple sclerosis, (-) muscular dystrophy, (-) RSD,(-) joint pain (-)swelling, (-) joint pain,swelling.  Neurologic: (-) epilepsy, (-)seizures,(-) brain tumor,(-)  TIA, (-)stroke, (-)headaches, (-)Parkinson disease,(-) memory loss, (-) LOC. Cardiovascular: (-) Chest pain, (-) swelling in legs/feet, (-) SOB, (-) cramping in legs/feet with walking. Subjective:    Constitutional:  The patient is alert and oriented x 3, appears to be stated age and in no distress. Temp 98 °F (36.7 °C)   Ht 5' 8\" (1.727 m)   Wt 170 lb (77.1 kg)   BMI 25.85 kg/m²     Skin:  Upon inspection: the skin appears warm, dry and intact. There is a previous scar over the affected area. There is not any cellulitis, lymphedema or cutaneous lesions noted in the lower extremities. Upon palpation there is no induration noted. Neurologic:  Gait: abnormal;  Motor exam of the lower extremities show ; quadriceps, hamstrings, foot dorsi and plantar flexors intact R.  5/5 and L. 5/5. Deep tendon reflexes are 2/4 at the knees and 2/4 at the ankles with strong extensor hallicus longus motor strength bilaterally. Sensory to both feet is intact to all sensory roots. Cardiovascular: The vascular exam is normal and is well perfused to distal extremities. Distal pulses DP/PT: R. 2+; L. 2+. There is cap refill noted less than two seconds in all digits. There is edema of the bilateral lower extremities. There is not varicosities noted in the distal extremities. Lymph:  Upon palpation,  there is no lymphadenopathy noted in bilateral lower extremities. Musculoskeletal:    Lumbar exam:  On visual inspection, there is not deformity of the spine. full range of motion, no tenderness, palpable spasm or pain on motion. Special tests: Straight Leg Raise negative, [place] testnegative. Hip Exam:  Exam of the right hip shows none leg length discrepancy. He has a antalgic gait Range of motion of the involved hip is normal, the hip joint is stable to testing. Strength of the lower extremity is equal bilaterally. Patient complains of tenderness at the  groin.       Xrays: right hip hemiarthroplasty well aligned with no signs of aseptic loosening    Impression:   Encounter Diagnoses   Name Primary? Shoulder impingement, left Yes    History of right hip hemiarthroplasty          Plan:   Hep  Activity as tolerated   I will proceed with a cortisone injection in the Left shoulder. Verbal and written consent was obtained for the injection. Skin was prepped with alcohol, 1 ml of Kenalog 40 mg and 9 ml of 0.25% Marcaine was injected to the posterior shoulder through the subacromial space of the Left Shoulder. The patient tolerated the injections well. I will see the patient back in 6 months with xr right hip.

## 2023-03-26 NOTE — PROGRESS NOTES
Internal Medicine Progress Note    TO=[facility]    Jaime Omalley. Alejandro Conley., TONJAOYANIRA. Bro Wilson D.O., JAVON Riley D.O. Ana Harris D.O. Trevon Gilbert, MSN, APRN, NP-C  Sonal Lira. Tiffany Swanson, MSN, APRN-CNP     Primary Care Physician: Murali Perez DO   Admitting Physician:  Mary Beverly DO  Admission date and time: 9/28/2022 12:09 PM    Room:  0529/0529-01  Admitting diagnosis: Hypomagnesemia [E83.42]  Hyponatremia [E87.1]  Symptomatic anemia [D64.9]  Fatigue, unspecified type [R53.83]  Chronic kidney disease, unspecified CKD stage [N18.9]  Acute renal failure superimposed on stage 4 chronic kidney disease, unspecified acute renal failure type ([facility] [address] [N17.9, N18.4]  Acute on chronic anemia [D64.9]    Patient Name: Mickey Jin  MRN: [id number]    Date of Service: 9/29/2022     Subjective: Corbin Wheat is a 81 y.o. male who was seen and examined today,9/29/2022, at the bedside. Patient does admit to fatigue. Patient states he has wounds on the bilateral heels. States he was scheduled to see the podiatrist next week but obviously has not made it to the point and states its not happened yet. We discussed that we can consult Dr. Gerson Tejeda and he is agreeable. He denies chest pain or shortness of breath. States he was told to come to the hospital because of worsening renal function. States he does follow-up with nephrology. family present during my examination. Review of System:   Constitutional:   Denies fever or chills, weight loss or gain, positive for fatigue/ malaise. HEENT:   Denies ear pain, sore throat, sinus or eye problems. Cardiovascular:   Denies any chest pain, irregular heartbeats, or palpitations. Respiratory:   Denies shortness of breath, coughing, sputum production, hemoptysis, or wheezing. Gastrointestinal:   Denies nausea, vomiting, diarrhea, or constipation.   Denies any abdominal pain.  Genitourinary:    Denies any urgency, frequency, hematuria. Voiding  without difficulty with Whitley catheter. Extremities:   Denies lower extremity swelling, edema or cyanosis. Neurology:    Denies any headache or focal neurological deficits, admits to generalized weakness  Psch:   Denies being anxious or depressed. Musculoskeletal:    Denies  myalgias, joint complaints or back pain. Integumentary:   Denies any rashes, ulcers, or excoriations. Denies bruising. Hematologic/Lymphatic:  Denies bruising or bleeding. Physical Exam:  No intake/output data recorded. Intake/Output Summary (Last 24 hours) at 9/29/2022 1658  Last data filed at 9/29/2022 0512  Gross per 24 hour   Intake --   Output 1325 ml   Net -1325 ml   I/O last 3 completed shifts:  In: -   Out: 5003 [Urine:1325]  Patient Vitals for the past 96 hrs (Last 3 readings):   Weight   09/28/22 2113 164 lb 8 oz (74.6 kg)     Vital Signs:   Blood pressure (!) 164/68, pulse 99, temperature 98.7 °F (37.1 °C), temperature source Oral, resp. rate 18, height 5' 7.5\" (1.715 m), weight 164 lb 8 oz (74.6 kg), SpO2 95 %. General appearance:  Alert, responsive, oriented to person, place, and time. Well preserved, alert, no distress. Head:  Normocephalic. No masses, lesions or tenderness. Eyes:  PERRLA. EOMI. Sclera clear. ENT:  Ears normal. Mucosa normal.  Missing teeth. Eyeglasses. Impaired vision. Neck:    Supple. Trachea midline. No thyromegaly. No JVD. No bruits. Heart:    Rhythm regular. Rate controlled. No murmurs. Lungs:    Symmetrical. Clear to auscultation bilaterally. No wheezes. No rhonchi. No rales. Abdomen:   Soft. Non-tender. Non-distended. Bowel sounds positive. No organomegaly or masses. No pain on palpation. Extremities:    Peripheral pulses present. No peripheral edema. No ulcers. No cyanosis. No clubbing. Neurologic:    Alert x 3. No focal deficit. Cranial nerves grossly intact. No focal weakness.   Psych: Behavior is normal. Mood appears normal. Speech is not rapid and/or pressured. Musculoskeletal:   Spine ROM normal. Muscular strength intact. Gait not assessed. Integumentary:  No rashes. Pressure ulcer of the left heel with eschar noted. Right heel callus. Genitalia/Breast:  Whitley catheter    Medication:  Scheduled Meds:   ferric gluconate (FERRLECIT) test dose IVPB  25 mg IntraVENous Once    Followed by    ferric gluconate (FERRLECIT) IVPB  100 mg IntraVENous Once    Followed by    Alfredo Byrne ON 9/30/2022] ferric gluconate (FERRLECIT) IVPB  125 mg IntraVENous Once    pantoprazole  40 mg Oral BID AC    atorvastatin  80 mg Oral Nightly    Vitamin D  2,000 Units Oral Daily    [Held by provider] clopidogrel  75 mg Oral Daily    [Held by provider] hydroxychloroquine  200 mg Oral Daily    budesonide  0.5 mg Nebulization BID    Arformoterol Tartrate  15 mcg Nebulization BID    sodium chloride flush  5-40 mL IntraVENous 2 times per day    aspirin  81 mg Oral Daily     Continuous Infusions:   sodium chloride      sodium chloride         Objective Data:  Recent Labs     09/28/22  1308 09/28/22  1452 09/29/22  0039 09/29/22  0744 09/29/22  1413   WBC 15.2*  --   --  14.2*  --    RBC 2.26*  --   --  2.46*  --    HGB 6.8*  --  7.5* 7.4* 7.7*   HCT 21.1*   < > 22.2* 22.2* 23.2*   MCV 93.4  --   --  90.2  --    MCH 30.1  --   --  30.1  --    MCHC 32.2  --   --  33.3  --    RDW 14.4  --   --  14.6  --    *  --   --  466*  --    MPV 9.9  --   --  10.0  --     < > = values in this interval not displayed.      Recent Labs     09/28/22  1308 09/29/22  0744   * 130*   K 3.8 3.7   CL 91* 95*   CO2 21* 21*   BUN 72* 57*   CREATININE 4.2* 3.0*   GLUCOSE 153* 117*   CALCIUM 8.8 8.7   PROT 7.0 6.3*   LABALBU 3.0* 2.7*   BILITOT 0.2 0.5   ALKPHOS 129 126   AST 28 19   ALT 30 24     No results found for: TROPONINI       Wound Documentation:   Patient has pressure ulcer of the left heel, Magallanes stage grade 1 which was present on admission. Patient also has right heel callus. Assessment:  Acute on chronic renal failure stage IV-V, with multiple electrolyte disturbance and acidosis noted  Acute on chronic anemia without overt blood loss identified  Cerebrovascular disease with relatively recent multifocal CVA, chronic residual left-sided deficits   pressure ulcer of the left heel, Magallanes stage grade 1 which was present on admission. right heel callus  Hypomagnesia  Central vascular disease with total occlusion of the left ICA and has been of a carotid stent in the severely stenosed right internal carotid artery August 18, 2022 high-dose aspirin and Plavix therapy (Plavix end date 10/2/2022)  Hyperlipidemia  Hyperglycemia without documented history of diabetes, hemoglobin A1c 5.1%  Nonoxygen dependent COPD with chronic tobacco abuse  Hyperuricemia with history of gout on chronic allopurinol therapy  Recent hospitalization for right basicervical femoral neck fracture status post orthopedic repair 08/2022  Moderate to severe lumbar spinal stenosis  Essential hypertension       Plan:   Patient has pressure ulcer of the left heel and callus of the right therefore podiatry will be consulted-Dr. Sagrario Means  Nephrology is following the patient. Recommendations have been reviewed. CAYETANO with concern that the recent contrast studies he had at [facility] may be contributory. We will continue monitor renal function and adjust therapy accordingly. Maintain Whitley catheter. Monitor intake and output closely. Continue IV fluids for gentle hydration  GI is following secondary to anemia. Continue IV iron supplementation. Once acute issues are resolved plan for nonemergent upper endoscopic/colonoscopy. Labs as ordered  Continue to monitor magnesium levels/magnesium supplementation as needed to replete magnesium stores. PT/OT to evaluate and treat  Protonix for GI prophylaxis   continue current therapy. See orders for further plan of care.     More than 50% of my  time was spent at the bedside counseling/coordinating care with the patient and/or family with face to face contact. This time was spent reviewing notes and laboratory data as well as instructing and counseling the patient. Time I spent with the family or surrogate(s) is included only if the patient was incapable of providing the necessary information or participating in medical decisions. I also discussed the differential diagnosis and all of the proposed management plans with the patient and individuals accompanying the patient. VLADIMIR Garces - CNP,  9/29/2022  4:58 PM        I saw and evaluated the patient. I agree with the findings and the plan of care as documented in Scout Gatica NP-C's  note.     Judith Weaver DO, D.O., FACOI  6:13 PM  9/29/2022 - - -

## 2023-07-06 ENCOUNTER — OFFICE VISIT (OUTPATIENT)
Dept: RHEUMATOLOGY | Age: 73
End: 2023-07-06
Payer: MEDICARE

## 2023-07-06 VITALS — WEIGHT: 185 LBS | BODY MASS INDEX: 28.04 KG/M2 | HEIGHT: 68 IN

## 2023-07-06 DIAGNOSIS — M06.09 RHEUMATOID ARTHRITIS OF MULTIPLE SITES WITH NEGATIVE RHEUMATOID FACTOR (HCC): Primary | ICD-10-CM

## 2023-07-06 DIAGNOSIS — M1A.09X0 IDIOPATHIC CHRONIC GOUT OF MULTIPLE SITES WITHOUT TOPHUS: ICD-10-CM

## 2023-07-06 DIAGNOSIS — Z79.899 HIGH RISK MEDICATION USE: ICD-10-CM

## 2023-07-06 DIAGNOSIS — M15.9 GENERALIZED OSTEOARTHRITIS: ICD-10-CM

## 2023-07-06 DIAGNOSIS — N18.32 STAGE 3B CHRONIC KIDNEY DISEASE (HCC): ICD-10-CM

## 2023-07-06 DIAGNOSIS — M06.09 RHEUMATOID ARTHRITIS OF MULTIPLE SITES WITH NEGATIVE RHEUMATOID FACTOR (HCC): ICD-10-CM

## 2023-07-06 LAB
ALBUMIN SERPL-MCNC: 4.4 G/DL (ref 3.5–5.2)
ALP SERPL-CCNC: 143 U/L (ref 40–129)
ALT SERPL-CCNC: 19 U/L (ref 0–40)
ANION GAP SERPL CALCULATED.3IONS-SCNC: 9 MMOL/L (ref 7–16)
AST SERPL-CCNC: 23 U/L (ref 0–39)
BASOPHILS # BLD: 0.05 E9/L (ref 0–0.2)
BASOPHILS NFR BLD: 0.5 % (ref 0–2)
BILIRUB SERPL-MCNC: 0.3 MG/DL (ref 0–1.2)
BUN SERPL-MCNC: 36 MG/DL (ref 6–23)
CALCIUM SERPL-MCNC: 9.7 MG/DL (ref 8.6–10.2)
CHLORIDE SERPL-SCNC: 107 MMOL/L (ref 98–107)
CO2 SERPL-SCNC: 26 MMOL/L (ref 22–29)
CREAT SERPL-MCNC: 1.7 MG/DL (ref 0.7–1.2)
CRP SERPL HS-MCNC: 0.8 MG/DL (ref 0–0.4)
EOSINOPHIL # BLD: 0.33 E9/L (ref 0.05–0.5)
EOSINOPHIL NFR BLD: 3 % (ref 0–6)
ERYTHROCYTE [DISTWIDTH] IN BLOOD BY AUTOMATED COUNT: 13.2 FL (ref 11.5–15)
ERYTHROCYTE [SEDIMENTATION RATE] IN BLOOD BY WESTERGREN METHOD: 26 MM/HR (ref 0–15)
GLUCOSE SERPL-MCNC: 117 MG/DL (ref 74–99)
HCT VFR BLD AUTO: 35.7 % (ref 37–54)
HGB BLD-MCNC: 11.5 G/DL (ref 12.5–16.5)
IMM GRANULOCYTES # BLD: 0.03 E9/L
IMM GRANULOCYTES NFR BLD: 0.3 % (ref 0–5)
LYMPHOCYTES # BLD: 1.53 E9/L (ref 1.5–4)
LYMPHOCYTES NFR BLD: 14 % (ref 20–42)
MCH RBC QN AUTO: 32.2 PG (ref 26–35)
MCHC RBC AUTO-ENTMCNC: 32.2 % (ref 32–34.5)
MCV RBC AUTO: 100 FL (ref 80–99.9)
MONOCYTES # BLD: 0.87 E9/L (ref 0.1–0.95)
MONOCYTES NFR BLD: 8 % (ref 2–12)
NEUTROPHILS # BLD: 8.1 E9/L (ref 1.8–7.3)
NEUTS SEG NFR BLD: 74.2 % (ref 43–80)
PLATELET # BLD AUTO: 264 E9/L (ref 130–450)
PMV BLD AUTO: 10.5 FL (ref 7–12)
POTASSIUM SERPL-SCNC: 4.8 MMOL/L (ref 3.5–5)
PROT SERPL-MCNC: 7.6 G/DL (ref 6.4–8.3)
RBC # BLD AUTO: 3.57 E12/L (ref 3.8–5.8)
SODIUM SERPL-SCNC: 142 MMOL/L (ref 132–146)
WBC # BLD: 10.9 E9/L (ref 4.5–11.5)

## 2023-07-06 PROCEDURE — 99215 OFFICE O/P EST HI 40 MIN: CPT | Performed by: INTERNAL MEDICINE

## 2023-07-06 PROCEDURE — 1123F ACP DISCUSS/DSCN MKR DOCD: CPT | Performed by: INTERNAL MEDICINE

## 2023-07-06 RX ORDER — HYDROXYCHLOROQUINE SULFATE 200 MG/1
400 TABLET, FILM COATED ORAL EVERY MORNING
Qty: 180 TABLET | Refills: 3 | Status: SHIPPED | OUTPATIENT
Start: 2023-07-06

## 2023-07-06 ASSESSMENT — ENCOUNTER SYMPTOMS
COUGH: 0
TROUBLE SWALLOWING: 0
ABDOMINAL PAIN: 0
NAUSEA: 0
SHORTNESS OF BREATH: 0
COLOR CHANGE: 0
VOMITING: 0
DIARRHEA: 0

## 2023-09-11 DIAGNOSIS — Z96.641 HISTORY OF RIGHT HIP HEMIARTHROPLASTY: Primary | ICD-10-CM

## 2023-09-12 ENCOUNTER — OFFICE VISIT (OUTPATIENT)
Dept: ORTHOPEDIC SURGERY | Age: 73
End: 2023-09-12

## 2023-09-12 VITALS — HEIGHT: 68 IN | TEMPERATURE: 98 F | WEIGHT: 185 LBS | BODY MASS INDEX: 28.04 KG/M2

## 2023-09-12 DIAGNOSIS — Z96.641 HISTORY OF RIGHT HIP HEMIARTHROPLASTY: ICD-10-CM

## 2023-09-12 DIAGNOSIS — M25.812 SHOULDER IMPINGEMENT, LEFT: Primary | ICD-10-CM

## 2023-09-12 RX ORDER — TRIAMCINOLONE ACETONIDE 40 MG/ML
40 INJECTION, SUSPENSION INTRA-ARTICULAR; INTRAMUSCULAR ONCE
Status: COMPLETED | OUTPATIENT
Start: 2023-09-12 | End: 2023-09-12

## 2023-09-12 RX ORDER — TRIAMCINOLONE ACETONIDE 40 MG/ML
40 INJECTION, SUSPENSION INTRA-ARTICULAR; INTRAMUSCULAR ONCE
Status: DISCONTINUED | OUTPATIENT
Start: 2023-09-12 | End: 2023-09-12

## 2023-09-12 RX ADMIN — TRIAMCINOLONE ACETONIDE 40 MG: 40 INJECTION, SUSPENSION INTRA-ARTICULAR; INTRAMUSCULAR at 08:46

## 2024-01-09 DIAGNOSIS — Z79.899 HIGH RISK MEDICATION USE: ICD-10-CM

## 2024-01-09 DIAGNOSIS — M06.09 RHEUMATOID ARTHRITIS OF MULTIPLE SITES WITH NEGATIVE RHEUMATOID FACTOR (HCC): Primary | ICD-10-CM

## 2024-01-11 RX ORDER — ALLOPURINOL 100 MG/1
100 TABLET ORAL EVERY MORNING
Qty: 90 TABLET | Refills: 3 | Status: SHIPPED | OUTPATIENT
Start: 2024-01-11

## 2024-02-27 ENCOUNTER — OFFICE VISIT (OUTPATIENT)
Dept: RHEUMATOLOGY | Age: 74
End: 2024-02-27
Payer: MEDICARE

## 2024-02-27 VITALS — HEIGHT: 68 IN | BODY MASS INDEX: 29.1 KG/M2 | WEIGHT: 192 LBS

## 2024-02-27 DIAGNOSIS — M06.09 RHEUMATOID ARTHRITIS OF MULTIPLE SITES WITH NEGATIVE RHEUMATOID FACTOR (HCC): Primary | ICD-10-CM

## 2024-02-27 DIAGNOSIS — M1A.39X0 CHRONIC GOUT DUE TO RENAL IMPAIRMENT OF MULTIPLE SITES WITHOUT TOPHUS: ICD-10-CM

## 2024-02-27 DIAGNOSIS — M15.9 GENERALIZED OSTEOARTHRITIS: ICD-10-CM

## 2024-02-27 DIAGNOSIS — Z79.899 HIGH RISK MEDICATION USE: ICD-10-CM

## 2024-02-27 DIAGNOSIS — N18.32 STAGE 3B CHRONIC KIDNEY DISEASE (HCC): ICD-10-CM

## 2024-02-27 DIAGNOSIS — M25.812 SHOULDER IMPINGEMENT, LEFT: ICD-10-CM

## 2024-02-27 PROCEDURE — 99214 OFFICE O/P EST MOD 30 MIN: CPT | Performed by: INTERNAL MEDICINE

## 2024-02-27 PROCEDURE — 1123F ACP DISCUSS/DSCN MKR DOCD: CPT | Performed by: INTERNAL MEDICINE

## 2024-02-27 PROCEDURE — 20610 DRAIN/INJ JOINT/BURSA W/O US: CPT | Performed by: INTERNAL MEDICINE

## 2024-02-27 RX ORDER — TRIAMCINOLONE ACETONIDE 40 MG/ML
40 INJECTION, SUSPENSION INTRA-ARTICULAR; INTRAMUSCULAR ONCE
Status: COMPLETED | OUTPATIENT
Start: 2024-02-27 | End: 2024-02-27

## 2024-02-27 RX ORDER — FUROSEMIDE 10 MG/ML
SOLUTION ORAL DAILY
COMMUNITY

## 2024-02-27 RX ADMIN — TRIAMCINOLONE ACETONIDE 40 MG: 40 INJECTION, SUSPENSION INTRA-ARTICULAR; INTRAMUSCULAR at 15:51

## 2024-02-27 ASSESSMENT — ENCOUNTER SYMPTOMS
COUGH: 0
COLOR CHANGE: 0
SHORTNESS OF BREATH: 0
DIARRHEA: 0
VOMITING: 0
ABDOMINAL PAIN: 0
TROUBLE SWALLOWING: 0
NAUSEA: 0

## 2024-02-27 NOTE — PROGRESS NOTES
After consent was obtained, using sterile technique the left shoulder was prepped and topical ethyl chloride was used as local anesthetic. The joint was entered from a posterior approach.  Steroid Kenalog 40 mg and 1 ml 1% Lidocaine was then injected using a 25 gauge needle and the needle withdrawn.  The procedure was well tolerated.  The patient is asked to continue to rest the joint for a few more days before resuming regular activities.  It may be more painful for the first 1-2 days.  Watch for fever, or increased swelling or persistent pain in the joint. Call or return to clinic prn if such symptoms occur or there is failure to improve as anticipated.  
atraumatic.      Right Ear: External ear normal.      Left Ear: External ear normal.      Nose: Nose normal.   Eyes:      General: No scleral icterus.  Pulmonary:      Effort: Pulmonary effort is normal.   Musculoskeletal:         General: Deformity present. No swelling or tenderness.      Comments: He has Heberden's and Dayanara's nodes.  There is no synovitis on exam.  He has decreased range of motion in the left shoulder.   Skin:     General: Skin is warm and dry.      Findings: No rash.   Neurological:      General: No focal deficit present.      Mental Status: He is alert and oriented to person, place, and time. Mental status is at baseline.   Psychiatric:         Mood and Affect: Mood normal.         Behavior: Behavior normal.            Lab Results   Component Value Date    WBC 10.9 07/06/2023    HGB 11.5 (L) 07/06/2023    HCT 35.7 (L) 07/06/2023    .0 (H) 07/06/2023     07/06/2023     Lab Results   Component Value Date     07/06/2023    K 4.8 07/06/2023     07/06/2023    CO2 26 07/06/2023    BUN 36 (H) 07/06/2023    CREATININE 1.7 (H) 07/06/2023    GLUCOSE 117 (H) 07/06/2023    CALCIUM 9.7 07/06/2023    PROT 7.6 07/06/2023    LABALBU 4.4 07/06/2023    BILITOT 0.3 07/06/2023    ALKPHOS 143 (H) 07/06/2023    AST 23 07/06/2023    ALT 19 07/06/2023    LABGLOM 43 07/06/2023    GFRAA 43 10/03/2022     Lab Results   Component Value Date    MARLIN NEGATIVE 05/28/2015     No results found for: \"RHEUMFACTOR\"  Lab Results   Component Value Date    SEDRATE 26 (H) 07/06/2023     Lab Results   Component Value Date    CRP 0.8 (H) 07/06/2023            An electronic signature was used to authenticate this note.    This note was generated with a voice recognition dictation system. Please disregard any errors or omission which have escaped my review.    --Noé Mckeon DO

## 2024-03-29 ENCOUNTER — TRANSCRIBE ORDERS (OUTPATIENT)
Age: 74
End: 2024-03-29

## 2024-03-29 DIAGNOSIS — I74.3 EMBOLISM AND THROMBOSIS OF ARTERIES OF LOWER EXTREMITY (HCC): Primary | ICD-10-CM

## 2024-04-02 DIAGNOSIS — G62.9 PERIPHERAL POLYNEUROPATHY: Primary | ICD-10-CM

## 2024-04-03 ENCOUNTER — PROCEDURE VISIT (OUTPATIENT)
Dept: PHYSICAL MEDICINE AND REHAB | Age: 74
End: 2024-04-03
Payer: MEDICARE

## 2024-04-03 VITALS — HEIGHT: 68 IN | BODY MASS INDEX: 29.1 KG/M2 | WEIGHT: 192 LBS

## 2024-04-03 DIAGNOSIS — G62.9 PERIPHERAL POLYNEUROPATHY: ICD-10-CM

## 2024-04-03 DIAGNOSIS — R20.2 PARESTHESIA: Primary | ICD-10-CM

## 2024-04-03 PROCEDURE — 95886 MUSC TEST DONE W/N TEST COMP: CPT | Performed by: PHYSICAL MEDICINE & REHABILITATION

## 2024-04-03 PROCEDURE — 99203 OFFICE O/P NEW LOW 30 MIN: CPT | Performed by: PHYSICAL MEDICINE & REHABILITATION

## 2024-04-03 PROCEDURE — 95909 NRV CNDJ TST 5-6 STUDIES: CPT | Performed by: PHYSICAL MEDICINE & REHABILITATION

## 2024-04-03 PROCEDURE — 1123F ACP DISCUSS/DSCN MKR DOCD: CPT | Performed by: PHYSICAL MEDICINE & REHABILITATION

## 2024-04-03 NOTE — PATIENT INSTRUCTIONS
Electrodiagnotic Laboratory  Accredited by the [facility] with Exemplary status  ASPEN Haas D.O.   [facility]  [address] [place] [address]  Phone: [phone]  Fax: [phone]        Today you had an electrodiagnostic exam which included nerve conduction studies (NCS) and electromyography (EMG). This test evaluated the electrical activity of your nerves and muscles to help determine if you have a nerve or muscle disease.  This test can help determine the location and type of a nerve or muscle problem. This will help your referring doctor diagnose your condition and determine the appropriate next step in your treatment plan.     After your test:    1. There are no long lasting side effects of the test.     2. You may resume your normal activities without restrictions.     3.  Resume any medications that were stopped for the test.     4  If you have sore areas or bruising in your muscles where the needle was placed, apply a cold pack to the sore area for 15-20 minutes three to four times a day as needed for pain.  The soreness should go away in about 1-2 days.     5. Your results were provided  Briefly at the end of your test and the final detailed report will be provided to your referring physician, and/or primary care physician and any other parties you requested within 1-2 days of the examination. You may wish to contact your referring provider after a few days to determine what they would like you to do next.     6.  Please call [phone] with any questions or concerns and if you develop increased body temperature/fever, swelling, tenderness, increased pain and/or drainage from the sites where the needle was placed.     Thank you for choosing us for your health care needs.

## 2024-04-03 NOTE — PROGRESS NOTES
Electrodiagnostic Laboratory  *Accredited by the [facility] with exemplary status  [address] [place] [address]  Phone: [phone]  Fax: [phone]      Date of Examination: 04/03/24    Patient Name: Osmany Crawford    An independent historian was not needed.     Osmany Crawford  is a 73 y.o. year old male who was seen today regarding   Chief Complaint   Patient presents with    Extremity Pain     Pain in feet in toes. Pain described as sharp, shooting. Pain rated 10/10.  Symptoms for more than 1 year.     Numbness     Left foot is whole foot numbness and right foot is just the toes.     Extremity Weakness     Bilateral leg weakness.    The symptoms are intermittent.       I have reviewed the referring provider's office note.    There is not a family history of neuromuscular conditions.     Physical Exam: General: The patient is in no apparent distress.  Dependent rubor bilateral legs.  2+ edema bilaterally. MSK: There is no joint effusion, deformity, instability, swelling, erythema or warmth.  AROM is full in the spine and extremities. SLR is negative. Neurologic:  No focal sensorimotor deficit.  Reflexes 2+ and symmetric. Gait is normal.    Impression:     1. Paresthesia        Plan:   EMG is indicated to evaluate the above diagnosis.    EMG was done today and showed a normal study. There is no evidence of a large fiber polyneuropathy on today's examination. EMG cannot rule out a small fiber neuropathy.   Consider correlation with lumbar imaging.   The patient was educated about the diagnosis and the prognosis.   Advised patient to follow up with referring provider.       Thank you for allowing me to participate in the care of your patient.      Sincerely,       Peggy Butts D.O., P.T.  Board Certified Physical Medicine and Rehabilitation  Board Certified Electrodiagnostic Medicine                 
Srini ANDERSEN, Srini Pichardo, DO

## 2024-04-18 ENCOUNTER — TELEPHONE (OUTPATIENT)
Dept: INTERVENTIONAL RADIOLOGY/VASCULAR | Age: 74
End: 2024-04-18

## 2024-04-18 NOTE — TELEPHONE ENCOUNTER
Spoke with patient and confirmed vascular testing appointment on 04/19/2024 at 10:30 am. Instructed patient to arrive 15 minutes prior to appointment time, Enter through Entrance B, register to right of entrance, and report to Cardiac Services for test. Patient verbalized understanding. Questions answered.

## 2024-04-19 ENCOUNTER — HOSPITAL ENCOUNTER (OUTPATIENT)
Dept: INTERVENTIONAL RADIOLOGY/VASCULAR | Age: 74
End: 2024-04-19
Payer: MEDICARE

## 2024-04-19 DIAGNOSIS — I74.3 EMBOLISM AND THROMBOSIS OF ARTERIES OF LOWER EXTREMITY (HCC): ICD-10-CM

## 2024-04-19 PROCEDURE — 93923 UPR/LXTR ART STDY 3+ LVLS: CPT

## 2024-07-22 ENCOUNTER — HOSPITAL ENCOUNTER (OUTPATIENT)
Age: 74
Discharge: HOME | End: 2024-07-22
Payer: MEDICARE

## 2024-07-22 LAB
ALBUMIN SERPL-MCNC: 4.2 G/DL (ref 3.5–5.2)
ALP SERPL-CCNC: 121 U/L (ref 40–129)
ALT SERPL-CCNC: 18 U/L (ref 0–40)
ANION GAP SERPL CALCULATED.3IONS-SCNC: 12 MMOL/L (ref 7–16)
AST SERPL-CCNC: 24 U/L (ref 0–39)
BASOPHILS # BLD: 0.04 K/UL (ref 0–0.2)
BASOPHILS NFR BLD: 0 % (ref 0–2)
BILIRUB SERPL-MCNC: 0.4 MG/DL (ref 0–1.2)
BILIRUB UR QL STRIP: NEGATIVE
BUN SERPL-MCNC: 39 MG/DL (ref 6–23)
CALCIUM SERPL-MCNC: 9.4 MG/DL (ref 8.6–10.2)
CHLORIDE SERPL-SCNC: 106 MMOL/L (ref 98–107)
CLARITY UR: ABNORMAL
CO2 SERPL-SCNC: 22 MMOL/L (ref 22–29)
COLOR UR: ABNORMAL
CREAT SERPL-MCNC: 1.9 MG/DL (ref 0.7–1.2)
CREAT UR-MCNC: 19.2 MG/DL (ref 40–278)
EOSINOPHIL # BLD: 0.37 K/UL (ref 0.05–0.5)
EOSINOPHILS RELATIVE PERCENT: 4 % (ref 0–6)
ERYTHROCYTE [DISTWIDTH] IN BLOOD BY AUTOMATED COUNT: 13.2 % (ref 11.5–15)
GFR, ESTIMATED: 37 ML/MIN/1.73M2
GLUCOSE SERPL-MCNC: 78 MG/DL (ref 74–99)
GLUCOSE UR STRIP-MCNC: NEGATIVE MG/DL
HCT VFR BLD AUTO: 36 % (ref 37–54)
HGB BLD-MCNC: 12.1 G/DL (ref 12.5–16.5)
HGB UR QL STRIP.AUTO: NEGATIVE
IMM GRANULOCYTES # BLD AUTO: <0.03 K/UL (ref 0–0.58)
IMM GRANULOCYTES NFR BLD: 0 % (ref 0–5)
KETONES UR STRIP-MCNC: NEGATIVE MG/DL
LEUKOCYTE ESTERASE UR QL STRIP: ABNORMAL
LYMPHOCYTES NFR BLD: 1.33 K/UL (ref 1.5–4)
LYMPHOCYTES RELATIVE PERCENT: 14 % (ref 20–42)
MAGNESIUM SERPL-MCNC: 1.8 MG/DL (ref 1.6–2.6)
MCH RBC QN AUTO: 32.7 PG (ref 26–35)
MCHC RBC AUTO-ENTMCNC: 33.6 G/DL (ref 32–34.5)
MCV RBC AUTO: 97.3 FL (ref 80–99.9)
MICROALBUMIN UR-MCNC: 17 MG/L (ref 0–19)
MICROALBUMIN/CREAT UR-RTO: 86 MCG/MG CREAT (ref 0–30)
MONOCYTES NFR BLD: 0.77 K/UL (ref 0.1–0.95)
MONOCYTES NFR BLD: 8 % (ref 2–12)
NEUTROPHILS NFR BLD: 73 % (ref 43–80)
NEUTS SEG NFR BLD: 6.85 K/UL (ref 1.8–7.3)
NITRITE UR QL STRIP: NEGATIVE
PH UR STRIP: 5.5 [PH] (ref 5–9)
PHOSPHATE SERPL-MCNC: 3.1 MG/DL (ref 2.5–4.5)
PLATELET # BLD AUTO: 227 K/UL (ref 130–450)
PMV BLD AUTO: 10.2 FL (ref 7–12)
POTASSIUM SERPL-SCNC: 4.3 MMOL/L (ref 3.5–5)
PROT SERPL-MCNC: 6.9 G/DL (ref 6.4–8.3)
PROT UR STRIP-MCNC: NEGATIVE MG/DL
RBC # BLD AUTO: 3.7 M/UL (ref 3.8–5.8)
SODIUM SERPL-SCNC: 140 MMOL/L (ref 132–146)
SP GR UR STRIP: 1.01 (ref 1–1.03)
TOTAL PROTEIN, URINE: 5 MG/DL (ref 0–12)
UROBILINOGEN UR STRIP-ACNC: 0.2 EU/DL (ref 0–1)
WBC OTHER # BLD: 9.4 K/UL (ref 4.5–11.5)

## 2024-07-22 PROCEDURE — 82570 ASSAY OF URINE CREATININE: CPT

## 2024-07-22 PROCEDURE — 84100 ASSAY OF PHOSPHORUS: CPT

## 2024-07-22 PROCEDURE — 80053 COMPREHEN METABOLIC PANEL: CPT

## 2024-07-22 PROCEDURE — 85025 COMPLETE CBC W/AUTO DIFF WBC: CPT

## 2024-07-22 PROCEDURE — 36415 COLL VENOUS BLD VENIPUNCTURE: CPT

## 2024-07-22 PROCEDURE — 82043 UR ALBUMIN QUANTITATIVE: CPT

## 2024-07-22 PROCEDURE — 81001 URINALYSIS AUTO W/SCOPE: CPT

## 2024-07-22 PROCEDURE — 83735 ASSAY OF MAGNESIUM: CPT

## 2024-07-22 PROCEDURE — 84156 ASSAY OF PROTEIN URINE: CPT

## 2024-07-23 LAB
BACTERIA URNS QL MICRO: ABNORMAL
EPI CELLS #/AREA URNS HPF: ABNORMAL /HPF
RBC #/AREA URNS HPF: ABNORMAL /HPF
WBC #/AREA URNS HPF: ABNORMAL /HPF

## 2024-08-09 LAB
BILIRUB UR QL STRIP: NEGATIVE
CLARITY UR: ABNORMAL
COLOR UR: YELLOW
GLUCOSE UR STRIP-MCNC: NEGATIVE MG/DL
HGB UR QL STRIP.AUTO: NEGATIVE
KETONES UR STRIP-MCNC: NEGATIVE MG/DL
LEUKOCYTE ESTERASE UR QL STRIP: ABNORMAL
NITRITE UR QL STRIP: NEGATIVE
PH UR STRIP: 5.5 [PH] (ref 5–9)
PROT UR STRIP-MCNC: NEGATIVE MG/DL
SP GR UR STRIP: 1.01 (ref 1–1.03)
UROBILINOGEN UR STRIP-ACNC: 0.2 EU/DL (ref 0–1)

## 2024-08-27 ENCOUNTER — OFFICE VISIT (OUTPATIENT)
Dept: RHEUMATOLOGY | Age: 74
End: 2024-08-27
Payer: MEDICARE

## 2024-08-27 VITALS — WEIGHT: 190 LBS | HEIGHT: 68 IN | BODY MASS INDEX: 28.79 KG/M2

## 2024-08-27 DIAGNOSIS — N18.32 STAGE 3B CHRONIC KIDNEY DISEASE (HCC): ICD-10-CM

## 2024-08-27 DIAGNOSIS — M1A.39X0 CHRONIC GOUT DUE TO RENAL IMPAIRMENT OF MULTIPLE SITES WITHOUT TOPHUS: ICD-10-CM

## 2024-08-27 DIAGNOSIS — M15.9 GENERALIZED OSTEOARTHRITIS: ICD-10-CM

## 2024-08-27 DIAGNOSIS — M25.812 SHOULDER IMPINGEMENT, LEFT: ICD-10-CM

## 2024-08-27 DIAGNOSIS — M06.09 RHEUMATOID ARTHRITIS OF MULTIPLE SITES WITH NEGATIVE RHEUMATOID FACTOR (HCC): Primary | ICD-10-CM

## 2024-08-27 DIAGNOSIS — Z79.899 HIGH RISK MEDICATION USE: ICD-10-CM

## 2024-08-27 PROCEDURE — 20610 DRAIN/INJ JOINT/BURSA W/O US: CPT | Performed by: INTERNAL MEDICINE

## 2024-08-27 PROCEDURE — 99214 OFFICE O/P EST MOD 30 MIN: CPT | Performed by: INTERNAL MEDICINE

## 2024-08-27 PROCEDURE — 1123F ACP DISCUSS/DSCN MKR DOCD: CPT | Performed by: INTERNAL MEDICINE

## 2024-08-27 RX ORDER — HYDROXYCHLOROQUINE SULFATE 200 MG/1
400 TABLET, FILM COATED ORAL EVERY MORNING
Qty: 180 TABLET | Refills: 3 | Status: SHIPPED | OUTPATIENT
Start: 2024-08-27

## 2024-08-27 RX ORDER — TRIAMCINOLONE ACETONIDE 40 MG/ML
40 INJECTION, SUSPENSION INTRA-ARTICULAR; INTRAMUSCULAR ONCE
Status: COMPLETED | OUTPATIENT
Start: 2024-08-27 | End: 2024-08-27

## 2024-08-27 RX ORDER — LIDOCAINE HYDROCHLORIDE 10 MG/ML
1 INJECTION, SOLUTION EPIDURAL; INFILTRATION; INTRACAUDAL; PERINEURAL ONCE
Status: COMPLETED | OUTPATIENT
Start: 2024-08-27 | End: 2024-08-27

## 2024-08-27 RX ORDER — ALLOPURINOL 100 MG/1
100 TABLET ORAL EVERY MORNING
Qty: 90 TABLET | Refills: 3 | Status: SHIPPED | OUTPATIENT
Start: 2024-08-27

## 2024-08-27 RX ADMIN — LIDOCAINE HYDROCHLORIDE 1 ML: 10 INJECTION, SOLUTION EPIDURAL; INFILTRATION; INTRACAUDAL; PERINEURAL at 10:56

## 2024-08-27 RX ADMIN — TRIAMCINOLONE ACETONIDE 40 MG: 40 INJECTION, SUSPENSION INTRA-ARTICULAR; INTRAMUSCULAR at 10:57

## 2024-08-27 ASSESSMENT — ENCOUNTER SYMPTOMS
ABDOMINAL PAIN: 0
TROUBLE SWALLOWING: 0
SHORTNESS OF BREATH: 0
VOMITING: 0
COUGH: 0
NAUSEA: 0
COLOR CHANGE: 0
DIARRHEA: 0

## 2024-08-27 NOTE — PROGRESS NOTES
Osmany Crawford 1950 is a 74 y.o. male, here for evaluation of the following chief complaint(s):  Follow-up (Osmany is here for a follow up for RA )      Assessment & Plan   ASSESSMENT/PLAN:    Osmany Crawford 1950 is a 74 y.o. male seen in follow-up for rheumatoid arthritis.    1.  Seronegative, nonerosive rheumatoid arthritis-denies any significant joint pain or swelling other than the left shoulder.  I see no striking synovitis on exam.  He is doing well on Plaquenil 400 mg daily.  I would not make any changes.    2.  High risk medication use-he follows with the eye doctor regularly while on Plaquenil.    3.  Chronic gout-no signs of acute attack currently.  Continue allopurinol 100 mg daily.  His uric acid level is elevated at 8.5 but since he has not had a gout attack in quite some time I will hold off on making any changes for right now but if he starts having attacks again will have a low threshold to titrate allopurinol to a goal uric acid of less than 6.  If we need to adjust allopurinol would adjust in 50 mg increments given CKD 3.    4.  CKD 3-avoid methotrexate and systemic NSAIDs.    5.  Carotid artery disease/peripheral vascular disease-status post stenting.  Patients with inflammatory arthritis have an increased cardiovascular risk.  He is on a statin.    6.  Osteoarthritis-need to avoid NSAIDs because of vascular disease and CKD 3.  Can take Tylenol as needed not to exceed 3000 mg total in a day.    7.  Left shoulder impingement-last visit we gave him an injection which was beneficial.  We will give him another injection today.    1. Rheumatoid arthritis of multiple sites with negative rheumatoid factor (HCC)  -     hydroxychloroquine (PLAQUENIL) 200 MG tablet; Take 2 tablets by mouth every morning, Disp-180 tablet, R-3Normal  -     allopurinol (ZYLOPRIM) 100 MG tablet; Take 1 tablet by mouth every morning, Disp-90 tablet, R-3Normal  -     lidocaine PF 1 % injection 1 mL; 1 mL,  Gout     Hypertension     Tobacco abuse         Review of Systems   Constitutional:  Negative for fatigue and fever.   HENT:  Negative for mouth sores and trouble swallowing.    Respiratory:  Negative for cough and shortness of breath.    Cardiovascular:  Negative for chest pain.   Gastrointestinal:  Negative for abdominal pain, diarrhea, nausea and vomiting.   Genitourinary:  Negative for dysuria and hematuria.   Musculoskeletal:  Positive for arthralgias. Negative for joint swelling.   Skin:  Negative for color change and rash.   Neurological:  Positive for numbness. Negative for weakness.   Hematological:  Negative for adenopathy.   All other systems reviewed and are negative.         Objective   There were no vitals filed for this visit.     Physical Exam  Constitutional:       General: He is not in acute distress.     Appearance: Normal appearance.   HENT:      Head: Normocephalic and atraumatic.      Nose: Nose normal.   Eyes:      General: No scleral icterus.  Pulmonary:      Effort: Pulmonary effort is normal.   Musculoskeletal:         General: Deformity present. No swelling or tenderness.      Comments: He has Heberden's and Dayanara's nodes.  There is no synovitis on exam.  He has decreased range of motion in the left shoulder.   Skin:     General: Skin is warm and dry.      Findings: No rash.   Neurological:      General: No focal deficit present.      Mental Status: He is alert and oriented to person, place, and time. Mental status is at baseline.   Psychiatric:         Mood and Affect: Mood normal.         Behavior: Behavior normal.            Lab Results   Component Value Date    WBC 9.4 07/22/2024    HGB 12.1 (L) 07/22/2024    HCT 36.0 (L) 07/22/2024    MCV 97.3 07/22/2024     07/22/2024     Lab Results   Component Value Date     07/22/2024    K 4.3 07/22/2024     07/22/2024    CO2 22 07/22/2024    BUN 39 (H) 07/22/2024    CREATININE 1.9 (H) 07/22/2024    GLUCOSE 78 07/22/2024

## 2024-08-27 NOTE — PROGRESS NOTES
After consent was obtained, using sterile technique the left shoulder was prepped and topical ethyl chloride was used as local anesthetic. The joint was from a posterior approach.  Steroid Kenalog 40 mg and 1 ml 1% Lidocaine was then injected using a 22 gauge needle and the needle withdrawn.  The procedure was well tolerated.  The patient is asked to continue to rest the joint for a few more days before resuming regular activities.  It may be more painful for the first 1-2 days.  Watch for fever, or increased swelling or persistent pain in the joint. Call or return to clinic prn if such symptoms occur or there is failure to improve as anticipated.

## 2025-03-11 ENCOUNTER — OFFICE VISIT (OUTPATIENT)
Dept: RHEUMATOLOGY | Age: 75
End: 2025-03-11
Payer: MEDICARE

## 2025-03-11 VITALS
WEIGHT: 190 LBS | SYSTOLIC BLOOD PRESSURE: 160 MMHG | OXYGEN SATURATION: 97 % | BODY MASS INDEX: 28.79 KG/M2 | DIASTOLIC BLOOD PRESSURE: 74 MMHG | HEIGHT: 68 IN | HEART RATE: 86 BPM

## 2025-03-11 DIAGNOSIS — M1A.39X0 CHRONIC GOUT DUE TO RENAL IMPAIRMENT OF MULTIPLE SITES WITHOUT TOPHUS: ICD-10-CM

## 2025-03-11 DIAGNOSIS — M06.09 RHEUMATOID ARTHRITIS OF MULTIPLE SITES WITH NEGATIVE RHEUMATOID FACTOR (HCC): Primary | ICD-10-CM

## 2025-03-11 DIAGNOSIS — M1A.09X0 IDIOPATHIC CHRONIC GOUT OF MULTIPLE SITES WITHOUT TOPHUS: ICD-10-CM

## 2025-03-11 DIAGNOSIS — N18.32 STAGE 3B CHRONIC KIDNEY DISEASE (HCC): ICD-10-CM

## 2025-03-11 DIAGNOSIS — M15.9 GENERALIZED OSTEOARTHRITIS: ICD-10-CM

## 2025-03-11 DIAGNOSIS — Z79.899 HIGH RISK MEDICATION USE: ICD-10-CM

## 2025-03-11 DIAGNOSIS — I65.21 STENOSIS OF RIGHT CAROTID ARTERY: ICD-10-CM

## 2025-03-11 PROCEDURE — 1123F ACP DISCUSS/DSCN MKR DOCD: CPT | Performed by: INTERNAL MEDICINE

## 2025-03-11 PROCEDURE — 1159F MED LIST DOCD IN RCRD: CPT | Performed by: INTERNAL MEDICINE

## 2025-03-11 PROCEDURE — 99214 OFFICE O/P EST MOD 30 MIN: CPT | Performed by: INTERNAL MEDICINE

## 2025-03-11 PROCEDURE — G2211 COMPLEX E/M VISIT ADD ON: HCPCS | Performed by: INTERNAL MEDICINE

## 2025-03-11 ASSESSMENT — ENCOUNTER SYMPTOMS
VOMITING: 0
NAUSEA: 0
COLOR CHANGE: 0
SHORTNESS OF BREATH: 0
DIARRHEA: 0
TROUBLE SWALLOWING: 0
COUGH: 0
ABDOMINAL PAIN: 0

## 2025-03-11 NOTE — PROGRESS NOTES
The patient (or guardian, if applicable) and other individuals in attendance with the patient were advised that Artificial Intelligence will be utilized during this visit to record, process the conversation to generate a clinical note, and support improvement of the AI technology. The patient (or guardian, if applicable) and other individuals in attendance at the appointment consented to the use of AI, including the recording.                  Osmany Crawford 1950 is a 74 y.o. male, here for evaluation of the following chief complaint(s):  Follow-up (Osmany is here as a follow up for RA )      Assessment & Plan   ASSESSMENT/PLAN:    Osmany Crawford 1950 is a 74 y.o. male seen in follow-up for rheumatoid arthritis.    1.  Seronegative, nonerosive rheumatoid arthritis-denies any significant joint pain or swelling other than the left shoulder.  I see no striking synovitis on exam.  Continues to do well on Plaquenil 400 mg daily.  I would not make any changes.    2.  High risk medication use-he follows with the eye doctor regularly while on Plaquenil.  He had recent labs with his PCP which I have personally reviewed.    3.  Chronic gout-no signs of acute attack currently.  Continue allopurinol 100 mg daily.  His uric acid level is elevated but since he has not had a gout attack in quite some time I will hold off on making any changes for right now but if he starts having attacks again will have a low threshold to titrate allopurinol to a goal uric acid of less than 6.  If we need to adjust allopurinol would adjust in 50 mg increments given CKD 3.    4.  CKD 3-avoid methotrexate and systemic NSAIDs.    5.  Carotid artery disease/peripheral vascular disease-status post stenting.  Patients with inflammatory arthritis have an increased cardiovascular risk.  He is on a statin.    6.  Osteoarthritis-need to avoid NSAIDs because of vascular disease and CKD 3.  Can take Tylenol as needed not to exceed 3000 mg total

## 2025-08-17 DIAGNOSIS — Z79.899 HIGH RISK MEDICATION USE: ICD-10-CM

## 2025-08-17 DIAGNOSIS — M06.09 RHEUMATOID ARTHRITIS OF MULTIPLE SITES WITH NEGATIVE RHEUMATOID FACTOR (HCC): ICD-10-CM

## 2025-08-18 RX ORDER — HYDROXYCHLOROQUINE SULFATE 200 MG/1
400 TABLET, FILM COATED ORAL EVERY MORNING
Qty: 180 TABLET | Refills: 3 | Status: SHIPPED | OUTPATIENT
Start: 2025-08-18

## 2025-09-04 ENCOUNTER — OFFICE VISIT (OUTPATIENT)
Dept: RHEUMATOLOGY | Age: 75
End: 2025-09-04
Payer: MEDICARE

## 2025-09-04 VITALS — WEIGHT: 195 LBS | BODY MASS INDEX: 29.55 KG/M2 | HEIGHT: 68 IN

## 2025-09-04 DIAGNOSIS — M25.812 SHOULDER IMPINGEMENT, LEFT: ICD-10-CM

## 2025-09-04 DIAGNOSIS — M1A.09X0 IDIOPATHIC CHRONIC GOUT OF MULTIPLE SITES WITHOUT TOPHUS: ICD-10-CM

## 2025-09-04 DIAGNOSIS — M15.9 GENERALIZED OSTEOARTHRITIS: ICD-10-CM

## 2025-09-04 DIAGNOSIS — N18.32 STAGE 3B CHRONIC KIDNEY DISEASE (HCC): ICD-10-CM

## 2025-09-04 DIAGNOSIS — Z79.899 HIGH RISK MEDICATION USE: ICD-10-CM

## 2025-09-04 DIAGNOSIS — M1A.39X0 CHRONIC GOUT DUE TO RENAL IMPAIRMENT OF MULTIPLE SITES WITHOUT TOPHUS: ICD-10-CM

## 2025-09-04 DIAGNOSIS — M06.09 RHEUMATOID ARTHRITIS OF MULTIPLE SITES WITH NEGATIVE RHEUMATOID FACTOR (HCC): Primary | ICD-10-CM

## 2025-09-04 PROCEDURE — NBSRV NON-BILLABLE SERVICE: Performed by: INTERNAL MEDICINE

## 2025-09-04 PROCEDURE — 1123F ACP DISCUSS/DSCN MKR DOCD: CPT | Performed by: INTERNAL MEDICINE

## 2025-09-04 PROCEDURE — 96372 THER/PROPH/DIAG INJ SC/IM: CPT | Performed by: INTERNAL MEDICINE

## 2025-09-04 PROCEDURE — 99214 OFFICE O/P EST MOD 30 MIN: CPT | Performed by: INTERNAL MEDICINE

## 2025-09-04 RX ORDER — ALLOPURINOL 100 MG/1
100 TABLET ORAL EVERY MORNING
Qty: 90 TABLET | Refills: 3 | Status: SHIPPED | OUTPATIENT
Start: 2025-09-04

## 2025-09-04 RX ORDER — TRIAMCINOLONE ACETONIDE 40 MG/ML
40 INJECTION, SUSPENSION INTRA-ARTICULAR; INTRAMUSCULAR ONCE
Status: COMPLETED | OUTPATIENT
Start: 2025-09-04 | End: 2025-09-04

## 2025-09-04 RX ORDER — LIDOCAINE HYDROCHLORIDE 10 MG/ML
1 INJECTION, SOLUTION EPIDURAL; INFILTRATION; INTRACAUDAL; PERINEURAL ONCE
Status: COMPLETED | OUTPATIENT
Start: 2025-09-04 | End: 2025-09-04

## 2025-09-04 RX ORDER — HYDROXYCHLOROQUINE SULFATE 200 MG/1
400 TABLET, FILM COATED ORAL EVERY MORNING
Qty: 180 TABLET | Refills: 3 | Status: SHIPPED | OUTPATIENT
Start: 2025-09-04

## 2025-09-04 RX ADMIN — TRIAMCINOLONE ACETONIDE 40 MG: 40 INJECTION, SUSPENSION INTRA-ARTICULAR; INTRAMUSCULAR at 10:39

## 2025-09-04 RX ADMIN — LIDOCAINE HYDROCHLORIDE 1 ML: 10 INJECTION, SOLUTION EPIDURAL; INFILTRATION; INTRACAUDAL; PERINEURAL at 10:39

## 2025-09-04 ASSESSMENT — ENCOUNTER SYMPTOMS
COUGH: 0
NAUSEA: 0
ABDOMINAL PAIN: 0
VOMITING: 0
COLOR CHANGE: 0
SHORTNESS OF BREATH: 0
TROUBLE SWALLOWING: 0
DIARRHEA: 0

## (undated) DEVICE — MEDI-VAC YANKAUER SUCTION HANDLE: Brand: CARDINAL HEALTH

## (undated) DEVICE — APPLICATOR MEDICATED 26 CC SOLUTION HI LT ORNG CHLORAPREP

## (undated) DEVICE — Device

## (undated) DEVICE — GUN GLUE STRYKER

## (undated) DEVICE — SHEET DRAPE FULL 70X100

## (undated) DEVICE — ELECTRODE PT RET AD L9FT HI MOIST COND ADH HYDRGEL CORDED

## (undated) DEVICE — BANDAGE COMPR W6INXL5YD SELF ADH COHESIVE CO FLX

## (undated) DEVICE — SYRINGE IRRIG 60ML SFT PLIABLE BLB EZ TO GRP 1 HND USE W/

## (undated) DEVICE — SUTURE SUTTAPE L40IN DIA1.3MM NONABSORBABLE WHT BLU L26.5MM AR7500

## (undated) DEVICE — PAD,ABDOMINAL,8"X10",ST,LF: Brand: MEDLINE

## (undated) DEVICE — GARMENT,MEDLINE,DVT,INT,CALF,MED, GEN2: Brand: MEDLINE

## (undated) DEVICE — PEEL-AWAY HOOD: Brand: FLYTE, SURGICOOL

## (undated) DEVICE — GOWN,SIRUS,POLYRNF,RAGLAN,XL,ST,30/CS: Brand: MEDLINE

## (undated) DEVICE — SET EXTN L14IN 1ML IV L BOR NO FLTR NO STPCOCK

## (undated) DEVICE — NDL CNTR 40CT FM MAG: Brand: MEDLINE INDUSTRIES, INC.

## (undated) DEVICE — GOWN,SIRUS,FABRNF,XL,20/CS: Brand: MEDLINE

## (undated) DEVICE — PENCIL ES L3M BTTN SWCH HOLSTER W/ BLDE ELECTRD EDGE

## (undated) DEVICE — TOWEL,OR,DSP,ST,BLUE,STD,6/PK,12PK/CS: Brand: MEDLINE

## (undated) DEVICE — APPLICATOR SURG XL L38CM FOR ARISTA ABSRB HEMSTAT FLEXITIP

## (undated) DEVICE — SURGICAL PROCEDURE PACK ORTH IV ECLIPSE STRL

## (undated) DEVICE — SUTURE STRATAFIX SYMMETRIC SZ 1 L18IN ABSRB VLT CT1 L36CM SXPP1A404

## (undated) DEVICE — BANDAGE COMPR L W4INXL11YD 100% COT WVN E DBL LEN CLP CLSR

## (undated) DEVICE — COVER,TABLE,60X90,STERILE: Brand: MEDLINE

## (undated) DEVICE — TUBING, SUCTION, 1/4" X 10', STRAIGHT: Brand: MEDLINE

## (undated) DEVICE — NEEDLE FLTR 18GA L1.5IN MEM THK5UM BLNT DISP

## (undated) DEVICE — HANDPIECE SET WITH BONE CLEANING TIP: Brand: INTERPULSE

## (undated) DEVICE — SET MAJOR INSTR ORTHO

## (undated) DEVICE — BLADE ES L6IN ELASTOMERIC COAT EXT DURABLE BEND UPTO 90DEG

## (undated) DEVICE — PITCHER PT 1200ML W HNDL CSR WRP

## (undated) DEVICE — 2108 SERIES SAGITTAL BLADE (24.8 X 0.88 X 90.5MM)

## (undated) DEVICE — 3M™ STERI-DRAPE™ U-DRAPE 1015: Brand: STERI-DRAPE™

## (undated) DEVICE — INTENDED FOR TISSUE SEPARATION, AND OTHER PROCEDURES THAT REQUIRE A SHARP SURGICAL BLADE TO PUNCTURE OR CUT.: Brand: BARD-PARKER ® STAINLESS STEEL BLADES

## (undated) DEVICE — GAUZE,SPONGE,4"X4",16PLY,STRL,LF,10/TRAY: Brand: MEDLINE

## (undated) DEVICE — SOLUTION IV IRRIG 500ML 0.9% SODIUM CHL 2F7123

## (undated) DEVICE — SYRINGE MED 50ML LUERLOCK TIP

## (undated) DEVICE — COVER,LIGHT HANDLE,FLX,1/PK: Brand: MEDLINE INDUSTRIES, INC.

## (undated) DEVICE — MARKER,SKIN,WI/RULER AND LABELS: Brand: MEDLINE

## (undated) DEVICE — DRESSING GZ W1XL8IN COT XRFRM N ADH OVERWRAP CURAD

## (undated) DEVICE — SOLUTION IV IRRIG POUR BRL 0.9% SODIUM CHL 2F7124

## (undated) DEVICE — 3M™ IOBAN™ 2 ANTIMICROBIAL INCISE DRAPE 6651EZ: Brand: IOBAN™ 2

## (undated) DEVICE — DOUBLE BASIN SET: Brand: MEDLINE INDUSTRIES, INC.

## (undated) DEVICE — SOLUTION IV IRRIG WATER 1000ML POUR BRL 2F7114

## (undated) DEVICE — APPLICATOR MEDICATED 3 CC SOLUTION CLR STRL CHLORAPREP

## (undated) DEVICE — PAD,ABDOMINAL,5"X9",ST,LF,25/BX: Brand: MEDLINE INDUSTRIES, INC.

## (undated) DEVICE — SPONGE LAP W18XL18IN WHT COT 4 PLY FLD STRUNG RADPQ DISP ST

## (undated) DEVICE — DRESSING HYDROFIBER AQUACEL AG ADVANTAGE 3.5X12 IN

## (undated) DEVICE — Z DISCONTINUED USE 2275686 GLOVE SURG SZ 8 L12IN FNGR THK13MIL WHT ISOLEX POLYISOPRENE